# Patient Record
Sex: FEMALE | Race: WHITE | HISPANIC OR LATINO | Employment: OTHER | URBAN - METROPOLITAN AREA
[De-identification: names, ages, dates, MRNs, and addresses within clinical notes are randomized per-mention and may not be internally consistent; named-entity substitution may affect disease eponyms.]

---

## 2017-11-03 ENCOUNTER — OFFICE VISIT (OUTPATIENT)
Dept: OPTOMETRY | Facility: CLINIC | Age: 62
End: 2017-11-03
Payer: MEDICARE

## 2017-11-03 DIAGNOSIS — H52.03 HYPEROPIA WITH PRESBYOPIA OF BOTH EYES: ICD-10-CM

## 2017-11-03 DIAGNOSIS — H25.13 NUCLEAR SCLEROSIS OF BOTH EYES: Primary | ICD-10-CM

## 2017-11-03 DIAGNOSIS — H52.4 HYPEROPIA WITH PRESBYOPIA OF BOTH EYES: ICD-10-CM

## 2017-11-03 PROCEDURE — 92015 DETERMINE REFRACTIVE STATE: CPT | Mod: ,,, | Performed by: OPTOMETRIST

## 2017-11-03 PROCEDURE — 92004 COMPRE OPH EXAM NEW PT 1/>: CPT | Mod: S$PBB,,, | Performed by: OPTOMETRIST

## 2017-11-03 PROCEDURE — 99999 PR PBB SHADOW E&M-NEW PATIENT-LVL II: CPT | Mod: PBBFAC,,, | Performed by: OPTOMETRIST

## 2017-11-03 PROCEDURE — 99202 OFFICE O/P NEW SF 15 MIN: CPT | Mod: PBBFAC | Performed by: OPTOMETRIST

## 2017-11-03 RX ORDER — TRAMADOL HYDROCHLORIDE 50 MG/1
TABLET ORAL 2 TIMES DAILY PRN
COMMUNITY
End: 2018-03-13

## 2017-11-03 RX ORDER — SIMVASTATIN 40 MG/1
40 TABLET, FILM COATED ORAL DAILY
COMMUNITY
End: 2019-01-21 | Stop reason: SDUPTHER

## 2017-11-03 RX ORDER — CLONAZEPAM 1 MG/1
0.5 TABLET ORAL 2 TIMES DAILY
COMMUNITY
End: 2018-09-17 | Stop reason: SDUPTHER

## 2017-11-03 RX ORDER — TELMISARTAN 80 MG/1
TABLET ORAL DAILY
COMMUNITY
End: 2018-09-17 | Stop reason: CLARIF

## 2017-11-03 RX ORDER — MULTIVIT WITH MINERALS/HERBS
1 TABLET ORAL DAILY
COMMUNITY
End: 2020-09-23

## 2017-11-03 RX ORDER — CHOLECALCIFEROL (VITAMIN D3) 25 MCG
1000 TABLET ORAL DAILY
COMMUNITY
End: 2019-03-22

## 2017-11-03 RX ORDER — CHOLECALCIFEROL (VITAMIN D3) 125 MCG
CAPSULE ORAL
COMMUNITY
End: 2018-02-05

## 2017-11-03 RX ORDER — MELOXICAM 15 MG/1
15 TABLET ORAL DAILY
COMMUNITY
End: 2018-02-05

## 2017-11-03 NOTE — PROGRESS NOTES
HPI     Last eye exam was approximately 10 years ago.  Patient states has noticed decrease in overall vision over the past few   years. Using +2.50 OTC readers and has noticed they aren't as helpful. Saw   black spots in vision prior to passing out from dehydration about a month   ago.   Patient denies diplopia, headaches, flashes/floaters, and pain.      Last edited by Dang Barrera on 11/3/2017  2:34 PM. (History)            Assessment /Plan     For exam results, see Encounter Report.    Nuclear sclerosis of both eyes    Hyperopia with presbyopia of both eyes            1.  Educated on cataracts and affects on vision.  Early-monitor.  Eye health normal OU.  2.  Bifocal rx given.  Ok to continue with OTC readers.          RTC 1 year for routine exam.

## 2018-02-05 ENCOUNTER — OFFICE VISIT (OUTPATIENT)
Dept: SPINE | Facility: CLINIC | Age: 63
End: 2018-02-05
Attending: PHYSICAL MEDICINE & REHABILITATION
Payer: MEDICARE

## 2018-02-05 VITALS
HEART RATE: 113 BPM | HEIGHT: 63 IN | DIASTOLIC BLOOD PRESSURE: 79 MMHG | BODY MASS INDEX: 35.61 KG/M2 | WEIGHT: 201 LBS | SYSTOLIC BLOOD PRESSURE: 132 MMHG

## 2018-02-05 DIAGNOSIS — M54.41 CHRONIC MIDLINE LOW BACK PAIN WITH BILATERAL SCIATICA: Primary | ICD-10-CM

## 2018-02-05 DIAGNOSIS — M47.26 OSTEOARTHRITIS OF SPINE WITH RADICULOPATHY, LUMBAR REGION: ICD-10-CM

## 2018-02-05 DIAGNOSIS — R29.818 NEUROGENIC CLAUDICATION: ICD-10-CM

## 2018-02-05 DIAGNOSIS — G89.29 CHRONIC MIDLINE LOW BACK PAIN WITH BILATERAL SCIATICA: Primary | ICD-10-CM

## 2018-02-05 DIAGNOSIS — M54.42 CHRONIC MIDLINE LOW BACK PAIN WITH BILATERAL SCIATICA: Primary | ICD-10-CM

## 2018-02-05 PROCEDURE — 99213 OFFICE O/P EST LOW 20 MIN: CPT | Mod: PBBFAC | Performed by: PHYSICAL MEDICINE & REHABILITATION

## 2018-02-05 PROCEDURE — 99204 OFFICE O/P NEW MOD 45 MIN: CPT | Mod: S$PBB,,, | Performed by: PHYSICAL MEDICINE & REHABILITATION

## 2018-02-05 PROCEDURE — 99999 PR PBB SHADOW E&M-EST. PATIENT-LVL III: CPT | Mod: PBBFAC,,, | Performed by: PHYSICAL MEDICINE & REHABILITATION

## 2018-02-05 RX ORDER — ETODOLAC 200 MG/1
200 CAPSULE ORAL 3 TIMES DAILY
Qty: 90 CAPSULE | Refills: 1 | Status: SHIPPED | OUTPATIENT
Start: 2018-02-05 | End: 2018-04-19 | Stop reason: SDUPTHER

## 2018-02-05 RX ORDER — CYCLOBENZAPRINE HCL 10 MG
5-10 TABLET ORAL 3 TIMES DAILY PRN
Qty: 90 TABLET | Refills: 0 | Status: SHIPPED | OUTPATIENT
Start: 2018-02-05 | End: 2018-03-08 | Stop reason: SDUPTHER

## 2018-02-05 NOTE — PROGRESS NOTES
Subjective:      Patient ID: Joanne Lino is a 62 y.o. female.    Chief Complaint: Low-back Pain    Referred by: Self[facility]     Ms Lino is a 61 yo female here for evaluation of back and leg pain that started on 2/1/2018 when she got up after sitting and fell to the ground with leg numbness.  She went to the ER on 2/2/2018 and was given a toradol shot nsaid and muscle relaxer.  She was walking on Thursday and had back pain and then she had tingling in her legs.  Then once she finished at theater, and was walking the pain came back with numbness in the legs.  She sat and then was able to walk on her own.  She has had episodes of back and leg tingling off and on for the past 10 years, but she has never fallen.  She feels like the medicine from ER has been helpful.  She no longer has numbness and tingling in her legs.  Maybe a little bit.  The pain is worse with standing and walking.  She was able to do everything she needed to this past weekend.  She can get relief sitting.  [place] and did PT at that time.  She has had no other treatment for her lower back.  She has not been to chiropractor or had injections.  She did lift some things prior to the episode.  There is no pain with bending.  PAin is 3/10 now, worst 8/10, best 0/10 lying down, sitting.  She has been taking flexeril twice a day.  She ahs been taking lodine three a day.  She takes mobic and tramadol for her knee.  She ahs not been taking    X-ray lumbar 2/2/2018  Findings:   No fracture identified.  Grade 1-2 spondylolisthesis L4-5 with disc space narrowing.  Facet arthropathy L4-5 and L5-S1.  Degenerative disc changes vacuum phenomenon multilevel sparing only the L3-4 disc space.   ============================  Impression             1.  Multilevel degenerative disc and facet changes.          2.  Grade 1-2 spondylolisthesis L4-5.      Past Medical History:  No date: Ectopic pregnancy  No date: High cholesterol  No date:  Hypertension  No date: Osteoarthritis    Past Surgical History:  No date: OOPHORECTOMY    No family history on file.      Social History    Marital status:              Spouse name:                       Years of education:                 Number of children:               Social History Main Topics    Smoking status: Never Smoker                                                                Smokeless tobacco: Never Used                        Alcohol use: Yes                Comment: Socially    Drug use: No                Current Outpatient Prescriptions:  b complex vitamins tablet, Take 1 tablet by mouth once daily., Disp: , Rfl:    CALCIUM CARBONATE (CALCIUM 500 ORAL), Take by mouth., Disp: , Rfl:   clonazePAM (KLONOPIN) 0.5 MG tablet, Take 0.25 mg by mouth 2 (two) times daily., Disp: , Rfl:   cyclobenzaprine (FLEXERIL) 10 MG tablet, Take 1 tablet (10 mg total) by mouth 3 (three) times daily as needed for Muscle spasms., Disp: 15 tablet, Rfl: 0  DULoxetine (CYMBALTA) 60 MG capsule, Take 60 mg by mouth once daily., Disp: , Rfl:   etodolac (LODINE) 200 MG Cap, Take 1 capsule (200 mg total) by mouth 3 (three) times daily., Disp: 30 capsule, Rfl: 0  meloxicam (MOBIC) 15 MG tablet, Take 15 mg by mouth once daily., Disp: , Rfl:   multivitamin capsule, Take 1 capsule by mouth once daily., Disp: , Rfl:   naproxen sodium (ALEVE) 220 mg Cap, Take by mouth., Disp: , Rfl:   simvastatin (ZOCOR) 40 MG tablet, Take 40 mg by mouth once daily. , Disp: , Rfl:   telmisartan (MICARDIS) 80 MG Tab, Take by mouth once daily. , Disp: , Rfl:   traMADol (ULTRAM) 50 mg tablet, Take by mouth 2 (two) times daily as needed. , Disp: , Rfl:   traZODone (DESYREL) 100 MG tablet, Take 200 mg by mouth every evening., Disp: , Rfl:   vitamin D 1000 units Tab, Take 1,000 Units by mouth once daily., Disp: , Rfl:   ziprasidone (GEODON) 20 MG Cap, Take 20 mg by mouth once daily., Disp: , Rfl:     No current facility-administered medications for  this visit.       Review of patient's allergies indicates:   -- Codeine -- Nausea Only   -- Pcn (penicillins) -- Itching                Review of Systems   Constitution: Negative for weight gain and weight loss.   Cardiovascular: Negative for chest pain.   Respiratory: Negative for shortness of breath.    Musculoskeletal: Positive for back pain. Negative for joint pain and joint swelling.   Gastrointestinal: Negative for abdominal pain and bowel incontinence.   Genitourinary: Negative for bladder incontinence.   Neurological: Positive for paresthesias (bilateral legs). Negative for numbness.           Objective:          General    Vitals reviewed.  Constitutional: She is oriented to person, place, and time. She appears well-developed and well-nourished.   HENT:   Head: Normocephalic and atraumatic.   Pulmonary/Chest: Effort normal.   Neurological: She is alert and oriented to person, place, and time.   Psychiatric: She has a normal mood and affect. Her behavior is normal. Judgment and thought content normal.     General Musculoskeletal Exam   Gait: normal (varus deformity of right knee)     Right Ankle/Foot Exam     Tests   Heel Walk: able to perform  Tiptoe Walk: able to perform    Left Ankle/Foot Exam     Tests   Heel Walk: able to perform  Tiptoe Walk: able to perform  Back (L-Spine & T-Spine) / Neck (C-Spine) Exam     Tenderness Posterior midline palpation reveals tenderness of the Sacrum.     Back (L-Spine & T-Spine) Range of Motion   Extension: 20   Flexion: 90   Lateral Bend Right: 20   Lateral Bend Left: 20   Rotation Right: 40   Rotation Left: 40     Spinal Sensation   Right Side Sensation  C-Spine Level: normal   L-Spine Level: normal  S-Spine Level: normal  Left Side Sensation  C-Spine Level: normal  L-Spine Level: normal  S-Spine Level: normal    Back (L-Spine & T-Spine) Tests   Right Side Tests  Straight leg raise:      Sitting SLR: > 70 degrees      Left Side Tests  Straight leg raise:     Sitting  SLR: > 70 degrees          Other She has no scoliosis .  Spinal Kyphosis:  Absent      Muscle Strength   Right Upper Extremity   Biceps: 5/5/5   Deltoid:  5/5  Triceps:  5/5  Wrist Extension: 5/5/5   Finger Flexors:  5/5  Left Upper Extremity  Biceps: 5/5/5   Deltoid:  5/5  Triceps:  5/5  Wrist Extension: 5/5/5   Finger Flexors:  5/5  Right Lower Extremity   Hip Flexion: 5/5   Quadriceps:  5/5   Anterior tibial:  5/5/5  EHL:  5/5  Left Lower Extremity   Hip Flexion: 5/5   Quadriceps:  5/5   Anterior tibial:  5/5/5   EHL:  5/5    Reflexes     Left Side  Biceps:  2+  Triceps:  2+  Brachioradialis:  2+  Quadriceps:  2+  Achilles:  2+  Left Marks's Sign:  Absent  Babinski Sign:  absent    Right Side   Biceps:  2+  Triceps:  2+  Brachioradialis:  2+  Quadriceps:  2+  Achilles:  2+  Right Marks's Sign:  absent  Babinski Sign:  absent    Vascular Exam     Right Pulses        Carotid:                  2+    Left Pulses        Carotid:                  2+        Assessment:       Encounter Diagnoses   Name Primary?    Chronic midline low back pain with bilateral sciatica Yes    Osteoarthritis of spine with radiculopathy, lumbar region     Neurogenic claudication          Plan:       Joanne was seen today for low-back pain.    Diagnoses and all orders for this visit:    Chronic midline low back pain with bilateral sciatica  -     Ambulatory Referral to Physical/Occupational Therapy    Osteoarthritis of spine with radiculopathy, lumbar region  -     Ambulatory Referral to Physical/Occupational Therapy    Neurogenic claudication    Other orders  -     etodolac (LODINE) 200 MG Cap; Take 1 capsule (200 mg total) by mouth 3 (three) times daily.  -     cyclobenzaprine (FLEXERIL) 10 MG tablet; Take 0.5-1 tablets (5-10 mg total) by mouth 3 (three) times daily as needed for Muscle spasms.         More than 50% of the total time of 45 minutes was spent in counseling on diagnosis and treatment options.  We discussed back pain  and the nature of back pain.  I reviewed the X-ray of the lumbar spine and we discussed facet arthropathy and the L4-5 Spondylolithesis.  We discussed that it will likely improve and that it is not one thing that causes the pain but an accumulation of multiple things that we do.  We discussed posture sitting and the importance of trying to sit better.  We discussed the benefits of therapy and exercise and continuing to move.  1.  Physical therapy: progressive resistance exercise pattern 2at healthy back  2.  Etodolac 200mg po TID  3. Cyclobenzaprine as needed  4.  RTC 10 weeks

## 2018-02-14 NOTE — PROGRESS NOTES
Subjective:      Patient ID: Joanne Lino is a 63 y.o. female.    Chief Complaint: Back Pain    Referred by: No ref. provider found     Ms Lino is a 62 yo female here for follow up of her back and right leg pain that started on 2/1/2018 when she got up after sitting and fell to the ground with leg numbness.  She went to the ER on 2/2/2018 and was given a toradol shot nsaid and muscle relaxer.   She has had episodes of back and leg tingling off and on for the past 10 years, but she has never fallen.  She was last seen by me on 2/5/2018 and she was going to try PT and nsaid.  She has not started therapy.  She has been having more pain with standing and walking, and has to sit because the numbness in the legs.  The pain is across the lower back.  The pain is tender to the touch.  She has not started therapy.  She feels like she has to sit sooner due to numbness. She has back pain with bending.  She has back pain with standing and walking.  She feels better sitting and lying down.  The pain will go away.  She has been taking the flexeril and etodolac.  She has been taking the pills daily, but still has pain.  She stopped cymbalta yesterday,  We discussed getting back on it.  She has a history of back pain for many years.  The pain is 8/10 now, worst 10[place]10 at the [place] standing 2 days ago, best 2/10 sitting or lying down.  She has some pain getting up from sitting.    X-ray lumbar 2/2/2018  Findings:   No fracture identified.  Grade 1-2 spondylolisthesis L4-5 with disc space narrowing.  Facet arthropathy L4-5 and L5-S1.  Degenerative disc changes vacuum phenomenon multilevel sparing only the L3-4 disc space.     Impression             1.  Multilevel degenerative disc and facet changes.       Past Medical History:  No date: Ectopic pregnancy  No date: High cholesterol  No date: Hypertension  No date: Osteoarthritis    Past Surgical History:  No date: OOPHORECTOMY    No family history on file.      Social History     Marital status:              Spouse name:                       Years of education:                 Number of children:               Social History Main Topics    Smoking status: Never Smoker                                                                Smokeless tobacco: Never Used                        Alcohol use: Yes                Comment: Socially    Drug use: No                Current Outpatient Prescriptions:  b complex vitamins tablet, Take 1 tablet by mouth once daily., Disp: , Rfl:    CALCIUM CARBONATE (CALCIUM 500 ORAL), Take by mouth., Disp: , Rfl:   clonazePAM (KLONOPIN) 0.5 MG tablet, Take 0.25 mg by mouth 2 (two) times daily., Disp: , Rfl:   cyclobenzaprine (FLEXERIL) 10 MG tablet, Take 0.5-1 tablets (5-10 mg total) by mouth 3 (three) times daily as needed for Muscle spasms., Disp: 90 tablet, Rfl: 0  DULoxetine (CYMBALTA) 60 MG capsule, Take 60 mg by mouth once daily., Disp: , Rfl:   etodolac (LODINE) 200 MG Cap, Take 1 capsule (200 mg total) by mouth 3 (three) times daily., Disp: 90 capsule, Rfl: 1  multivitamin capsule, Take 1 capsule by mouth once daily., Disp: , Rfl:   simvastatin (ZOCOR) 40 MG tablet, Take 40 mg by mouth once daily. , Disp: , Rfl:   telmisartan (MICARDIS) 80 MG Tab, Take by mouth once daily. , Disp: , Rfl:   traMADol (ULTRAM) 50 mg tablet, Take by mouth 2 (two) times daily as needed. , Disp: , Rfl:   traZODone (DESYREL) 100 MG tablet, Take 200 mg by mouth every evening., Disp: , Rfl:   vitamin D 1000 units Tab, Take 1,000 Units by mouth once daily., Disp: , Rfl:   ziprasidone (GEODON) 20 MG Cap, Take 20 mg by mouth once daily., Disp: , Rfl:     No current facility-administered medications for this visit.       Review of patient's allergies indicates:   -- Codeine -- Nausea Only   -- Pcn (penicillins) -- Itching            Review of Systems   Constitution: Negative for weight gain and weight loss.   Cardiovascular: Negative for chest pain.   Respiratory: Negative for  shortness of breath.    Musculoskeletal: Positive for back pain. Negative for joint pain and joint swelling.   Gastrointestinal: Negative for abdominal pain and bowel incontinence.   Genitourinary: Negative for bladder incontinence.   Neurological: Positive for paresthesias (bilateral legs). Negative for numbness.           Objective:          General    Vitals reviewed.  Constitutional: She is oriented to person, place, and time. She appears well-developed and well-nourished.   HENT:   Head: Normocephalic and atraumatic.   Pulmonary/Chest: Effort normal.   Neurological: She is alert and oriented to person, place, and time.   Psychiatric: She has a normal mood and affect. Her behavior is normal. Judgment and thought content normal.     General Musculoskeletal Exam   Gait: normal (varus deformity of right knee)     Right Ankle/Foot Exam     Tests   Heel Walk: able to perform  Tiptoe Walk: able to perform    Left Ankle/Foot Exam     Tests   Heel Walk: able to perform  Tiptoe Walk: able to perform  Back (L-Spine & T-Spine) / Neck (C-Spine) Exam     Tenderness Right paramedian tenderness of the Sacrum.     Back (L-Spine & T-Spine) Range of Motion   Extension: 20   Flexion: 90   Lateral Bend Right: 20   Lateral Bend Left: 20   Rotation Right: 40   Rotation Left: 40     Spinal Sensation   Right Side Sensation  C-Spine Level: normal   L-Spine Level: normal  S-Spine Level: normal  Left Side Sensation  C-Spine Level: normal  L-Spine Level: normal  S-Spine Level: normal    Back (L-Spine & T-Spine) Tests   Right Side Tests  Straight leg raise:      Sitting SLR: > 70 degrees      Left Side Tests  Straight leg raise:     Sitting SLR: > 70 degrees          Other She has no scoliosis .  Spinal Kyphosis:  Absent      Muscle Strength   Right Upper Extremity   Biceps: 5/5/5   Deltoid:  5/5  Triceps:  5/5  Wrist Extension: 5/5/5   Finger Flexors:  5/5  Left Upper Extremity  Biceps: 5/5/5   Deltoid:  5/5  Triceps:  5/5  Wrist  Extension: 5/5/5   Finger Flexors:  5/5  Right Lower Extremity   Hip Flexion: 5/5   Quadriceps:  5/5   Anterior tibial:  5/5/5  EHL:  5/5  Left Lower Extremity   Hip Flexion: 5/5   Quadriceps:  5/5   Anterior tibial:  5/5/5   EHL:  5/5    Reflexes     Left Side  Biceps:  2+  Triceps:  2+  Brachioradialis:  2+  Quadriceps:  2+  Achilles:  2+  Left Marks's Sign:  Absent  Babinski Sign:  absent    Right Side   Biceps:  2+  Triceps:  2+  Brachioradialis:  2+  Quadriceps:  2+  Achilles:  2+  Right Marks's Sign:  absent  Babinski Sign:  absent    Vascular Exam     Right Pulses        Carotid:                  2+    Left Pulses        Carotid:                  2+        Assessment:       Encounter Diagnoses   Name Primary?    Chronic right-sided low back pain with bilateral sciatica Yes    Spinal enthesopathy     Acute right-sided low back pain without sciatica           Plan:       Joanne was seen today for back pain.    Diagnoses and all orders for this visit:    Chronic right-sided low back pain with bilateral sciatica  -     INJECT TRIGGER POINT, 1 OR 2    Spinal enthesopathy  -     INJECT TRIGGER POINT, 1 OR 2    Acute right-sided low back pain without sciatica   -     INJECT TRIGGER POINT, 1 OR 2    Other orders  -     triamcinolone acetonide injection 40 mg; 1 mL (40 mg total) by Other route once.           1.  Physical therapy: progressive resistance exercise pattern 1 at healthy back  Her pain is more right then midback.  It is worse with bending,  She has acute on chronic back pain.    2.  Etodolac 200mg po TID  3. Cyclobenzaprine as needed  4.  Si joint/iliolumbar  Injection/ A time out was performed, the correct patient, procedure, site, and position confirmed.      right iliolumbar  injection:  the risks and benefits were explained verbal consent was obtained.  A time out was taken.  she was then placed in slightly flexed position the iliolumbar ligament was located and prepped with alcohol.  she was  then injected with a 22 gauge needle with 40mg kenolog and 2 cc of 2% lidocaine.  There were no complications, she felt some immediate relief of the pain.  5.  She is going to restart her cymbalta, she skipped yesterday  6.  RTC 10 weeks, already scheduled 4/13

## 2018-02-15 ENCOUNTER — OFFICE VISIT (OUTPATIENT)
Dept: SPINE | Facility: CLINIC | Age: 63
End: 2018-02-15
Attending: PHYSICAL MEDICINE & REHABILITATION
Payer: MEDICARE

## 2018-02-15 VITALS
HEART RATE: 91 BPM | HEIGHT: 63 IN | DIASTOLIC BLOOD PRESSURE: 70 MMHG | SYSTOLIC BLOOD PRESSURE: 142 MMHG | WEIGHT: 200 LBS | BODY MASS INDEX: 35.44 KG/M2

## 2018-02-15 DIAGNOSIS — G89.29 CHRONIC RIGHT-SIDED LOW BACK PAIN WITH BILATERAL SCIATICA: Primary | ICD-10-CM

## 2018-02-15 DIAGNOSIS — M54.50 ACUTE RIGHT-SIDED LOW BACK PAIN WITHOUT SCIATICA: ICD-10-CM

## 2018-02-15 DIAGNOSIS — M54.41 CHRONIC RIGHT-SIDED LOW BACK PAIN WITH BILATERAL SCIATICA: Primary | ICD-10-CM

## 2018-02-15 DIAGNOSIS — M54.42 CHRONIC RIGHT-SIDED LOW BACK PAIN WITH BILATERAL SCIATICA: Primary | ICD-10-CM

## 2018-02-15 DIAGNOSIS — M46.00 SPINAL ENTHESOPATHY: ICD-10-CM

## 2018-02-15 PROCEDURE — 99213 OFFICE O/P EST LOW 20 MIN: CPT | Mod: PBBFAC | Performed by: PHYSICAL MEDICINE & REHABILITATION

## 2018-02-15 PROCEDURE — 99214 OFFICE O/P EST MOD 30 MIN: CPT | Mod: 25,S$PBB,, | Performed by: PHYSICAL MEDICINE & REHABILITATION

## 2018-02-15 PROCEDURE — 20552 NJX 1/MLT TRIGGER POINT 1/2: CPT | Mod: PBBFAC | Performed by: PHYSICAL MEDICINE & REHABILITATION

## 2018-02-15 PROCEDURE — 99999 PR PBB SHADOW E&M-EST. PATIENT-LVL III: CPT | Mod: PBBFAC,,, | Performed by: PHYSICAL MEDICINE & REHABILITATION

## 2018-02-15 RX ORDER — TRIAMCINOLONE ACETONIDE 40 MG/ML
40 INJECTION, SUSPENSION INTRA-ARTICULAR; INTRAMUSCULAR ONCE
Status: COMPLETED | OUTPATIENT
Start: 2018-02-15 | End: 2018-02-15

## 2018-02-15 RX ADMIN — TRIAMCINOLONE ACETONIDE 40 MG: 40 INJECTION, SUSPENSION INTRA-ARTICULAR; INTRAMUSCULAR at 12:02

## 2018-02-27 ENCOUNTER — CLINICAL SUPPORT (OUTPATIENT)
Dept: REHABILITATION | Facility: OTHER | Age: 63
End: 2018-02-27
Attending: PHYSICAL MEDICINE & REHABILITATION
Payer: MEDICARE

## 2018-02-27 DIAGNOSIS — M54.42 CHRONIC MIDLINE LOW BACK PAIN WITH BILATERAL SCIATICA: ICD-10-CM

## 2018-02-27 DIAGNOSIS — M54.41 CHRONIC MIDLINE LOW BACK PAIN WITH BILATERAL SCIATICA: ICD-10-CM

## 2018-02-27 DIAGNOSIS — M47.26 OSTEOARTHRITIS OF SPINE WITH RADICULOPATHY, LUMBAR REGION: ICD-10-CM

## 2018-02-27 DIAGNOSIS — G89.29 CHRONIC MIDLINE LOW BACK PAIN WITH BILATERAL SCIATICA: ICD-10-CM

## 2018-02-27 PROCEDURE — 97162 PT EVAL MOD COMPLEX 30 MIN: CPT

## 2018-02-27 PROCEDURE — G8978 MOBILITY CURRENT STATUS: HCPCS | Mod: CL

## 2018-02-27 PROCEDURE — 97161 PT EVAL LOW COMPLEX 20 MIN: CPT

## 2018-02-27 PROCEDURE — G8979 MOBILITY GOAL STATUS: HCPCS | Mod: CK

## 2018-02-27 PROCEDURE — 97110 THERAPEUTIC EXERCISES: CPT

## 2018-02-27 NOTE — PATIENT INSTRUCTIONS
Pelvic Tilt        Flatten back by tightening stomach muscles and buttocks.  Repeat ____ times per set. Do ____ sets per session. Do ____ sessions per day.     [email or web address]     Copyright © [employer]. All rights reserved.   Knee-to-Chest Stretch: Bilateral        With hands behind knees, pull both knees in to chest until a comfortable stretch is felt in lower back and buttocks. Keep back relaxed. Hold ____ seconds.  Repeat ____ times per set. Do ____ sets per session. Do ____ sessions per day.     [email or web address]     Copyright © [employer]. All rights reserved.
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
## 2018-03-02 ENCOUNTER — OFFICE VISIT (OUTPATIENT)
Dept: PRIMARY CARE CLINIC | Facility: CLINIC | Age: 63
End: 2018-03-02
Payer: MEDICARE

## 2018-03-02 VITALS
OXYGEN SATURATION: 99 % | SYSTOLIC BLOOD PRESSURE: 114 MMHG | DIASTOLIC BLOOD PRESSURE: 75 MMHG | BODY MASS INDEX: 36.62 KG/M2 | RESPIRATION RATE: 18 BRPM | WEIGHT: 199 LBS | TEMPERATURE: 98 F | HEIGHT: 62 IN | HEART RATE: 94 BPM

## 2018-03-02 DIAGNOSIS — I10 ESSENTIAL HYPERTENSION, BENIGN: Primary | ICD-10-CM

## 2018-03-02 DIAGNOSIS — M54.41 CHRONIC RIGHT-SIDED LOW BACK PAIN WITH BILATERAL SCIATICA: ICD-10-CM

## 2018-03-02 DIAGNOSIS — E78.5 HYPERLIPIDEMIA, UNSPECIFIED HYPERLIPIDEMIA TYPE: ICD-10-CM

## 2018-03-02 DIAGNOSIS — M17.11 PRIMARY OSTEOARTHRITIS OF RIGHT KNEE: ICD-10-CM

## 2018-03-02 DIAGNOSIS — M54.42 CHRONIC RIGHT-SIDED LOW BACK PAIN WITH BILATERAL SCIATICA: ICD-10-CM

## 2018-03-02 DIAGNOSIS — G89.29 CHRONIC RIGHT-SIDED LOW BACK PAIN WITH BILATERAL SCIATICA: ICD-10-CM

## 2018-03-02 PROCEDURE — 99203 OFFICE O/P NEW LOW 30 MIN: CPT | Mod: S$PBB,,, | Performed by: FAMILY MEDICINE

## 2018-03-02 PROCEDURE — 99999 PR PBB SHADOW E&M-EST. PATIENT-LVL III: CPT | Mod: PBBFAC,,, | Performed by: FAMILY MEDICINE

## 2018-03-02 PROCEDURE — 99213 OFFICE O/P EST LOW 20 MIN: CPT | Mod: PBBFAC,PN | Performed by: FAMILY MEDICINE

## 2018-03-02 NOTE — PROGRESS NOTES
"Subjective:       Patient ID: Joanne Lino is a 63 y.o. female.    Chief Complaint: Establish Care    Recently moved here from [place], recently  from her . Currently staying with a friend. Patient's kids and grandkids all live in [place].   Currently being treated for flare up of lower back pain with sciatica, doing PT at [facility], had steroid injection to lower back a few weeks ago, got a few days of relief.  Also has OA of right knee, has had 2 Synvisc injections with good relief, requesting to be set up with local orthopedist.  Labs done by former PCP ~4 months ago.  Long overdue for mammogram, but doesn't want to schedule at this time.      Review of Systems   Constitutional: Negative for fever.   Eyes: Negative for visual disturbance.   Respiratory: Negative for shortness of breath.    Cardiovascular: Negative for chest pain.   Gastrointestinal: Negative for diarrhea, nausea and vomiting.   Genitourinary: Negative for difficulty urinating.   Musculoskeletal: Positive for arthralgias and back pain.   Skin: Negative for rash.   Neurological: Negative for dizziness and light-headedness.   Hematological: Does not bruise/bleed easily.   Psychiatric/Behavioral: Negative for sleep disturbance.       Objective:      Vitals:    03/02/18 0941   BP: 114/75   BP Location: Left arm   Patient Position: Sitting   BP Method: Large (Automatic)   Pulse: 94   Resp: 18   Temp: 98.2 °F (36.8 °C)   TempSrc: Oral   SpO2: 99%   Weight: 90.3 kg (199 lb)   Height: 5' 2" (1.575 m)     Physical Exam   Constitutional: She is oriented to person, place, and time. She appears well-developed and well-nourished.   HENT:   Head: Normocephalic and atraumatic.   Neck: Neck supple. No JVD present. Carotid bruit is not present.   Cardiovascular: Normal rate, regular rhythm and normal heart sounds.    Pulmonary/Chest: Effort normal and breath sounds normal.   Abdominal: Soft. Bowel sounds are normal. There is no " tenderness.   Musculoskeletal: She exhibits no edema.        Right knee: She exhibits normal range of motion (crepitus with passive RoM), no effusion, no deformity and no bony tenderness.   Neurological: She is alert and oriented to person, place, and time.   Skin: Skin is warm and dry.   Psychiatric: She has a normal mood and affect. Her behavior is normal.   Nursing note and vitals reviewed.      Assessment:       1. Essential hypertension, benign Stable   2. Primary osteoarthritis of right knee    3. Hyperlipidemia, unspecified hyperlipidemia type    4. Chronic right-sided low back pain with bilateral sciatica        Plan:       Essential hypertension, benign  Comments:  need to get records from previous PCP  Orders:  -     CBC auto differential; Future; Expected date: 06/02/2018  -     TSH; Future; Expected date: 06/02/2018  -     POCT EKG 12-LEAD (NOT FOR [facility] USE); Future; Expected date: 06/02/2018    Primary osteoarthritis of right knee  -     Ambulatory referral to Orthopedics    Hyperlipidemia, unspecified hyperlipidemia type  -     Comprehensive metabolic panel; Future; Expected date: 06/02/2018  -     Lipid panel; Future; Expected date: 06/02/2018  -     TSH; Future; Expected date: 06/02/2018    Chronic right-sided low back pain with bilateral sciatica  Comments:  continue PT, PM&R      Medication List with Changes/Refills   Current Medications    B COMPLEX VITAMINS TABLET    Take 1 tablet by mouth once daily.    CALCIUM CARBONATE (CALCIUM 500 ORAL)    Take by mouth.    CLONAZEPAM (KLONOPIN) 0.5 MG TABLET    Take 0.25 mg by mouth 2 (two) times daily.    CYCLOBENZAPRINE (FLEXERIL) 10 MG TABLET    Take 0.5-1 tablets (5-10 mg total) by mouth 3 (three) times daily as needed for Muscle spasms.    ETODOLAC (LODINE) 200 MG CAP    Take 1 capsule (200 mg total) by mouth 3 (three) times daily.    MULTIVITAMIN CAPSULE    Take 1 capsule by mouth once daily.    SIMVASTATIN (ZOCOR) 40 MG TABLET    Take 40 mg by  mouth once daily.     TELMISARTAN (MICARDIS) 80 MG TAB    Take by mouth once daily.     TRAMADOL (ULTRAM) 50 MG TABLET    Take by mouth 2 (two) times daily as needed.     TRAZODONE (DESYREL) 100 MG TABLET    Take 200 mg by mouth every evening.    VITAMIN D 1000 UNITS TAB    Take 1,000 Units by mouth once daily.

## 2018-03-05 ENCOUNTER — CLINICAL SUPPORT (OUTPATIENT)
Dept: REHABILITATION | Facility: OTHER | Age: 63
End: 2018-03-05
Attending: PHYSICAL MEDICINE & REHABILITATION
Payer: MEDICARE

## 2018-03-05 DIAGNOSIS — G89.29 CHRONIC MIDLINE LOW BACK PAIN WITH BILATERAL SCIATICA: Primary | ICD-10-CM

## 2018-03-05 DIAGNOSIS — M54.42 CHRONIC MIDLINE LOW BACK PAIN WITH BILATERAL SCIATICA: Primary | ICD-10-CM

## 2018-03-05 DIAGNOSIS — M54.41 CHRONIC MIDLINE LOW BACK PAIN WITH BILATERAL SCIATICA: Primary | ICD-10-CM

## 2018-03-05 PROCEDURE — 97110 THERAPEUTIC EXERCISES: CPT

## 2018-03-05 NOTE — PROGRESS NOTES
"[facility]      Name: Joanne Lino  Clinic Number: [id number]  Date of Treatment: 2018   Diagnosis:   Encounter Diagnosis   Name Primary?    Chronic midline low back pain with bilateral sciatica Yes      Physician: Tammi Matute, *    Pain pattern determined: 1PEN   , flexion responder  Plan of care signed: Not signed as of 2018   Time in: 2:00  Time Out: 3:00  Total Treatment time: 50  Precautions: HTN/OA, Grade 1-2 spondylolisthesis L4-5  Visit #: 2    POC due: Not signed as of 2018    Reassessment due:3/26/18    Subjective   Joanne reports worsening of symptoms.  Pt reported severe increase in soreness following IE. She found it difficult to walk for several days after (rating as 9/10 at worst). She purchased an ice pack, ball, and lumbar roll. She has been doing HEP but finds it hurts her back. Pt reporting feeling better by end of session to a 3/10 from 6/10 when presenting to PT. However, Pt called following session to report increased LE numbness while standing at the elevator which improved when sitting in car. Pt denies loss of bowel/bladder, weakness, or falls.     Patient reports their pain to be 6/10 on a 0-10 scale with 0 being no pain and 10 being the worst pain imaginable.    Pain Location: B LB/Lumbar    Occupation: No  Leisure: walk for exercise/play with [employer]/golfing/travel                     Pts goals:  "Be able to do more without pain , not fall"       Objective   Baseline IM Testing Results:   Date of testin18  ROM 18-42 deg   Max Peak Torque 84    Min Peak Torque 39    Flex/Ext Ratio 2.15   % below normative data 55     Femur 5     FOTO: Focus on Therapeutic Outcomes   Category: lumbar   % Impaired: 60%  Current Score  = CK = at least 40% but < 60% impaired, limited or restricted  Goal at Discharge Score = CJ = at least 20% but < 40% impaired, limited or restricted    Treatment    Pt was instructed in and " performed the following:     Joanne received therapeutic exercises to develop/improved posture, cardiovascular endurance, muscular endurance, lumbar/cervical ROM, strength and muscular endurance for 50 minutes including the following exercises:     [facility] 3/5/2018   Visit Number 2   VAS Pain Rating 6   Recumbent Bike Seat Pos. 12   Time 10   Lumbar Extension Seat Pad -   Femur Restraint -   Top Dead Center -   Counterweight -   Lumbar Flexion -   Lumbar Extension -   Lumbar Peak Torque -   Min Torque -   Percent From Norm -   Lumbar Weight 35   Repetitions 20   Rating of Perceived Exertion 2   Ice - Z Lie (in min.) 10     PPT 10x (Mod v/c to reduce hip extension and bridging)   TANYA (Pt reported pain when performing without therball)  Clam shell 10x (Per hip weakness and pt request for hip strengthening exercises)     Peripheral muscle strengthening which included 1 set of 15-20 repetitions at a slow, controlled 7 second per rep pace focused on strengthening supporting musculature for improved body mechanics and functional mobility.  Pt and therapist focused on proper form during treatment to ensure optimal strengthening of each targeted muscle group.  Machines were utilized including torso rotation, leg extension, leg curl, chest press, upright row. Tricep extension, bicep curl, leg press, and hip abduction added on third visit.       Joanne received the following manual therapy techniques: none    Home Exercise Program as follows:   Flexion in lying with theraball 10x, 3x/day   PPT 10x   Sidelying clamshells 15x    Handouts were given to the patient. Pt demo good understanding of the education provided. Joanne demonstrated good return demonstration of activities.     Lumbar roll use compliance: compliant with LR, ice pack, and theraball  Additional exercises taught this treatment session:   Sidelying clamshells 15x    Assessment     Pt presents to physical therapy for 2nd visit following initial  evaluation. Pt reports severe soreness following initial evaluation. Reviewed HEP exercises. Pt performed with moderate verbal cues for technique detailed above. Pt introduced to Med X lumbar dynamic exercises and peripheral resistance exercises up to upright row.  Pt tolerated Med X lumbar exercise at previous warm up weight of 35 with reported 2/10 Hakeem Exertion scale. Pt required moderate verbal cues to maintian speed to 7 sec per rep. Pt completed all peripheral resistance exercises with no reports of increased symptoms or discomfort.   Pt called following session to report increased LE numbness while standing at the elevator which improved when sitting in car. Pt denies loss of bowel/bladder, weakness, or falls.     Patient is making fair progress towards established goals.  Pt will continue to benefit from skilled outpatient physical therapy to address the deficits stated in the impairment chart, provide pt/family education and to maximize pt's level of independence in the home and community environment.       Pt's spiritual, cultural and educational needs considered and pt agreeable to plan of care and goals as stated below:     Medical necessity is demonstrated by the following problem list.    Pt presents with the following impairments:   History  Co-morbidities and personal factors that may impact the plan of care Examination  Body Structures and Functions, activity limitations and participation restrictions that may impact the plan of care Clinical Presentation    Decision Making/ Complexity Score   Co-morbidities:   OA/HTN     Personal Factors:   no deficits Body Regions:   back  lower extremities     Body Systems:   gross symmetry  ROM  strength  gross coordinated movement  gait  transfers  transitions  motor control     Activity limitations:   Learning and applying knowledge  no deficits     General Tasks and Commands  no deficits     Communication  no deficits     Mobility  lifting and carrying  objects  walking     Self care  no deficits     Domestic Life  no deficits     Interactions/Relationships  no deficits     Life Areas  no deficits     Community and Social Life  community life  recreation and leisure     Participation Restrictions:   Walking/standing > 5-10 min/bending fwd/stairs       evolving clinical presentation with changing clinical characteristics    moderate            GOALS: Pt is in agreement with the following goals.     Short term goals:  6 weeks or 10 visits   1.  Pt will demonstrate increased lumbar ROM by at least 3 degrees from the initial ROM value with improvements noted in functional ROM and ability to perform ADLs  2.  Pt will demonstrate increased maximum isometric torque value by 5% when compared to the initial value resulting in improved ability to perform bending, lifting, and carrying activities safely, confidently.  3.  Patient report a reduction in worst pain score by 1-2 points for improved tolerance during work and recreational activities  4.  Pt able to perform HEP correctly with minimal cueing or supervision for therapist     Long term goals: 13 weeks or 20 visits   1. Pt will demonstrate increased lumbar ROM by at least 6 degrees from initial ROM value, resulting in improved ability to perform functional fwd bending while standing and sitting.   2. Pt will demonstrate increased maximum isometric torque value by 10% when compared to the initial value resulting in improved ability to perform bending, lifting, and carrying activities safely, confidently.  3. Pt to demonstrate ability to independently control and reduce their pain through posture positioning and mechanical movements throughout a typical day.  4.  Patient will demonstrate improved overall function per FOTO Survey to CK = at least 40% but < 60% impaired, limited or restricted score or less.  5. Pt will be able to ambulate 10 minutes without experiencing LE numbness/weakness  6. Pt will be able to stairclimb  with RG without pain      Plan   Continue with established Plan of Care towards established PT goals.

## 2018-03-08 ENCOUNTER — OFFICE VISIT (OUTPATIENT)
Dept: SPINE | Facility: CLINIC | Age: 63
End: 2018-03-08
Attending: PHYSICAL MEDICINE & REHABILITATION
Payer: MEDICARE

## 2018-03-08 VITALS
HEIGHT: 62 IN | DIASTOLIC BLOOD PRESSURE: 74 MMHG | SYSTOLIC BLOOD PRESSURE: 143 MMHG | WEIGHT: 200 LBS | BODY MASS INDEX: 36.8 KG/M2 | HEART RATE: 79 BPM

## 2018-03-08 DIAGNOSIS — M54.42 CHRONIC RIGHT-SIDED LOW BACK PAIN WITH BILATERAL SCIATICA: Primary | ICD-10-CM

## 2018-03-08 DIAGNOSIS — M54.41 CHRONIC MIDLINE LOW BACK PAIN WITH BILATERAL SCIATICA: ICD-10-CM

## 2018-03-08 DIAGNOSIS — R29.818 NEUROGENIC CLAUDICATION: ICD-10-CM

## 2018-03-08 DIAGNOSIS — G89.29 CHRONIC RIGHT-SIDED LOW BACK PAIN WITH BILATERAL SCIATICA: Primary | ICD-10-CM

## 2018-03-08 DIAGNOSIS — M47.26 OSTEOARTHRITIS OF SPINE WITH RADICULOPATHY, LUMBAR REGION: ICD-10-CM

## 2018-03-08 DIAGNOSIS — G89.29 CHRONIC MIDLINE LOW BACK PAIN WITH BILATERAL SCIATICA: ICD-10-CM

## 2018-03-08 DIAGNOSIS — M54.50 ACUTE RIGHT-SIDED LOW BACK PAIN WITHOUT SCIATICA: ICD-10-CM

## 2018-03-08 DIAGNOSIS — M46.00 SPINAL ENTHESOPATHY: ICD-10-CM

## 2018-03-08 DIAGNOSIS — M54.41 CHRONIC RIGHT-SIDED LOW BACK PAIN WITH BILATERAL SCIATICA: Primary | ICD-10-CM

## 2018-03-08 DIAGNOSIS — M54.42 CHRONIC MIDLINE LOW BACK PAIN WITH BILATERAL SCIATICA: ICD-10-CM

## 2018-03-08 PROCEDURE — 99214 OFFICE O/P EST MOD 30 MIN: CPT | Mod: S$PBB,,, | Performed by: PHYSICAL MEDICINE & REHABILITATION

## 2018-03-08 PROCEDURE — 99999 PR PBB SHADOW E&M-EST. PATIENT-LVL III: CPT | Mod: PBBFAC,,, | Performed by: PHYSICAL MEDICINE & REHABILITATION

## 2018-03-08 PROCEDURE — 99213 OFFICE O/P EST LOW 20 MIN: CPT | Mod: PBBFAC | Performed by: PHYSICAL MEDICINE & REHABILITATION

## 2018-03-08 RX ORDER — CYCLOBENZAPRINE HCL 10 MG
5-10 TABLET ORAL 3 TIMES DAILY PRN
Qty: 90 TABLET | Refills: 1 | Status: SHIPPED | OUTPATIENT
Start: 2018-03-08 | End: 2018-04-23 | Stop reason: SDUPTHER

## 2018-03-08 RX ORDER — GABAPENTIN 100 MG/1
100-200 CAPSULE ORAL 3 TIMES DAILY
Qty: 180 CAPSULE | Refills: 2 | Status: SHIPPED | OUTPATIENT
Start: 2018-03-08 | End: 2018-06-20 | Stop reason: SDUPTHER

## 2018-03-08 NOTE — PROGRESS NOTES
Subjective:      Patient ID: Joanne Lino is a 63 y.o. female.    Chief Complaint: Low-back Pain    Referred by: No ref. provider found     Ms Lino is a 62 yo female here for follow up of her low back and right leg pain that started on 2/1/2018 when she got up after sitting and fell to the ground with leg numbness.  She went to the ER on 2/2/2018 and was given a toradol shot nsaid and muscle relaxer.   She has had episodes of back and leg tingling off and on for the past 10 years, but she has never fallen.  She was seen by me on 2/5/2018 and then on 2/15/2018 and was continuing to have pain but had not started therapy.  We did a right iliolumbar injection on 2/15 and she has been to 2 PT sessions.  She feels like she has increased pain.  She has increased burning in the lower back.  She feels like her legs are getting numb and tingling with standing too long.  She feels like she cannot stand and walk to far.  She feels like the injection gave her 100% relief for 2-3 days.  She is worried about going to [employer].  She is concerned about numbness and tingling.  It is not constant.  It gets better when she sits.  The pain is better with sitting.  Pain in the back is 8/10 now, worst 9/10  Standing.  She has more pain after therapy.  The numbness is the front of the leg to the top of foot, both legs.        X-ray lumbar 2/2/2018  Findings:   No fracture identified.  Grade 1-2 spondylolisthesis L4-5 with disc space narrowing.  Facet arthropathy L4-5 and L5-S1.  Degenerative disc changes vacuum phenomenon multilevel sparing only the L3-4 disc space.      Impression               1.  Multilevel degenerative disc and facet changes.     Past Medical History:  No date: Carpal tunnel syndrome  No date: Depression  No date: Ectopic pregnancy  No date: High cholesterol  No date: Hypertension  No date: Osteoarthritis  No date: PTSD (post-traumatic stress disorder)    Past Surgical History:  No date: OOPHORECTOMY    Review  of patient's family history indicates:    Cancer                         Mother                    Heart disease                  Mother                    Heart disease                  Father                    Cancer                         Daughter                    Social History    Marital status:              Spouse name:                       Years of education:                 Number of children:               Social History Main Topics    Smoking status: Never Smoker                                                                Smokeless tobacco: Never Used                        Alcohol use: Yes                Comment: Socially    Drug use: No                Current Outpatient Prescriptions:  b complex vitamins tablet, Take 1 tablet by mouth once daily., Disp: , Rfl:    CALCIUM CARBONATE (CALCIUM 500 ORAL), Take by mouth., Disp: , Rfl:   clonazePAM (KLONOPIN) 0.5 MG tablet, Take 0.25 mg by mouth 2 (two) times daily., Disp: , Rfl:   cyclobenzaprine (FLEXERIL) 10 MG tablet, Take 0.5-1 tablets (5-10 mg total) by mouth 3 (three) times daily as needed for Muscle spasms., Disp: 90 tablet, Rfl: 0  etodolac (LODINE) 200 MG Cap, Take 1 capsule (200 mg total) by mouth 3 (three) times daily., Disp: 90 capsule, Rfl: 1  multivitamin capsule, Take 1 capsule by mouth once daily., Disp: , Rfl:   simvastatin (ZOCOR) 40 MG tablet, Take 40 mg by mouth once daily. , Disp: , Rfl:   telmisartan (MICARDIS) 80 MG Tab, Take by mouth once daily. , Disp: , Rfl:   traMADol (ULTRAM) 50 mg tablet, Take by mouth 2 (two) times daily as needed. , Disp: , Rfl:   traZODone (DESYREL) 100 MG tablet, Take 200 mg by mouth every evening., Disp: , Rfl:   vitamin D 1000 units Tab, Take 1,000 Units by mouth once daily., Disp: , Rfl:     No current facility-administered medications for this visit.       Review of patient's allergies indicates:   -- Codeine -- Nausea Only   -- Pcn (penicillins) -- Itching            Review of Systems    Constitution: Negative for weight gain and weight loss.   Cardiovascular: Negative for chest pain.   Respiratory: Negative for shortness of breath.    Musculoskeletal: Positive for back pain. Negative for joint pain and joint swelling.   Gastrointestinal: Negative for abdominal pain and bowel incontinence.   Genitourinary: Negative for bladder incontinence.   Neurological: Positive for paresthesias (bilateral legs). Negative for numbness.           Objective:          General    Vitals reviewed.  Constitutional: She is oriented to person, place, and time. She appears well-developed and well-nourished.   HENT:   Head: Normocephalic and atraumatic.   Pulmonary/Chest: Effort normal.   Neurological: She is alert and oriented to person, place, and time.   Psychiatric: She has a normal mood and affect. Her behavior is normal. Judgment and thought content normal.     General Musculoskeletal Exam   Gait: normal (varus deformity of right knee)     Right Ankle/Foot Exam     Tests   Heel Walk: able to perform  Tiptoe Walk: able to perform    Left Ankle/Foot Exam     Tests   Heel Walk: able to perform  Tiptoe Walk: able to perform  Back (L-Spine & T-Spine) / Neck (C-Spine) Exam     Back (L-Spine & T-Spine) Range of Motion   Extension: 20   Flexion: 90   Lateral Bend Right: 20   Lateral Bend Left: 20   Rotation Right: 40   Rotation Left: 40     Spinal Sensation   Right Side Sensation  C-Spine Level: normal   L-Spine Level: normal  S-Spine Level: normal  Left Side Sensation  C-Spine Level: normal  L-Spine Level: normal  S-Spine Level: normal    Back (L-Spine & T-Spine) Tests   Right Side Tests  Straight leg raise:      Sitting SLR: > 70 degrees      Left Side Tests  Straight leg raise:     Sitting SLR: > 70 degrees          Other She has no scoliosis .  Spinal Kyphosis:  Absent      Muscle Strength   Right Upper Extremity   Biceps: 5/5/5   Deltoid:  5/5  Triceps:  5/5  Wrist Extension: 5/5/5   Finger Flexors:  5/5  Left Upper  Extremity  Biceps: 5/5/5   Deltoid:  5/5  Triceps:  5/5  Wrist Extension: 5/5/5   Finger Flexors:  5/5  Right Lower Extremity   Hip Flexion: 5/5   Quadriceps:  5/5   Anterior tibial:  5/5/5  EHL:  5/5  Left Lower Extremity   Hip Flexion: 5/5   Quadriceps:  5/5   Anterior tibial:  5/5/5   EHL:  5/5    Reflexes     Left Side  Biceps:  2+  Triceps:  2+  Brachioradialis:  2+  Quadriceps:  2+  Achilles:  2+  Left Marks's Sign:  Absent  Babinski Sign:  absent    Right Side   Biceps:  2+  Triceps:  2+  Brachioradialis:  2+  Quadriceps:  2+  Achilles:  2+  Right Marks's Sign:  absent  Babinski Sign:  absent    Vascular Exam     Right Pulses        Carotid:                  2+    Left Pulses        Carotid:                  2+        Assessment:       Encounter Diagnoses   Name Primary?    Chronic right-sided low back pain with bilateral sciatica Yes    Spinal enthesopathy     Acute right-sided low back pain without sciatica      Chronic midline low back pain with bilateral sciatica     Osteoarthritis of spine with radiculopathy, lumbar region     Neurogenic claudication          Plan:       Joanne was seen today for low-back pain.    Diagnoses and all orders for this visit:    Chronic right-sided low back pain with bilateral sciatica  -     MRI Lumbar Spine Without Contrast; Future    Spinal enthesopathy  -     MRI Lumbar Spine Without Contrast; Future    Acute right-sided low back pain without sciatica   -     MRI Lumbar Spine Without Contrast; Future    Chronic midline low back pain with bilateral sciatica  -     MRI Lumbar Spine Without Contrast; Future    Osteoarthritis of spine with radiculopathy, lumbar region  -     MRI Lumbar Spine Without Contrast; Future    Neurogenic claudication  -     MRI Lumbar Spine Without Contrast; Future    Other orders  -     gabapentin (NEURONTIN) 100 MG capsule; Take 1-2 capsules (100-200 mg total) by mouth 3 (three) times daily.  -     cyclobenzaprine (FLEXERIL) 10 MG  tablet; Take 0.5-1 tablets (5-10 mg total) by mouth 3 (three) times daily as needed for Muscle spasms.           1.  Continue Physical therapy: progressive resistance exercise pattern 1 at [facility]  She did get 100% relief from iliolumbar injection, but just a few days.  We discussed the numbness and neurogenic claudication.    2.  Etodolac 200mg po TID  3. Cyclobenzaprine as needed  4.  MRI of the lumbar spine  5.  Gabapentin 100-200 mg TID  6.  RTC 10 weeks, already scheduled 4/13

## 2018-03-09 ENCOUNTER — CLINICAL SUPPORT (OUTPATIENT)
Dept: REHABILITATION | Facility: OTHER | Age: 63
End: 2018-03-09
Attending: PHYSICAL MEDICINE & REHABILITATION
Payer: MEDICARE

## 2018-03-09 DIAGNOSIS — G89.29 CHRONIC RIGHT-SIDED LOW BACK PAIN WITH BILATERAL SCIATICA: ICD-10-CM

## 2018-03-09 DIAGNOSIS — M54.41 CHRONIC RIGHT-SIDED LOW BACK PAIN WITH BILATERAL SCIATICA: ICD-10-CM

## 2018-03-09 DIAGNOSIS — M54.42 CHRONIC RIGHT-SIDED LOW BACK PAIN WITH BILATERAL SCIATICA: ICD-10-CM

## 2018-03-09 PROCEDURE — 97110 THERAPEUTIC EXERCISES: CPT

## 2018-03-09 NOTE — PROGRESS NOTES
"[facility]      Name: Joanne Lino  Clinic Number: [id number]  Date of Treatment: 2018   Diagnosis:   No diagnosis found.   Physician: Tammi Matute, *    Pain pattern determined: 1PEN   , flexion responder  Plan of care signed: Not signed as of 2018   Time in: 8:00  Time Out: 9:00  Total Treatment time: 50  Precautions: HTN/OA, Grade 1-2 spondylolisthesis L4-5  Visit #: 3    POC due: Not signed as of 2018    Reassessment due:3/26/18    Subjective   Joanne reports 7/10 LB and minimal tingling of her B LE. She purchased an ice pack, ball, and lumbar roll. She has been doing HEP. Pt states she has falling before due to her legs become numb and her legs give out.     Patient reports their pain to be 7/10 on a 0-10 scale with 0 being no pain and 10 being the worst pain imaginable.    Pain Location: B LB/Lumbar    Occupation: No  Leisure: walk for exercise/play with [email or web address]/travel                     Pts goals:  "Be able to do more without pain , not fall"     Face to Face discussion of patient was done between PT and PTA.     Objective   Baseline IM Testing Results:   Date of testin18  ROM 18-42 deg   Max Peak Torque 84    Min Peak Torque 39    Flex/Ext Ratio 2.15   % below normative data 55     Femur 5     FOTO: Focus on Therapeutic Outcomes   Category: lumbar   % Impaired: 60%  Current Score  = CK = at least 40% but < 60% impaired, limited or restricted  Goal at Discharge Score = CJ = at least 20% but < 40% impaired, limited or restricted    Treatment    Pt was instructed in and performed the following:     Joanne received therapeutic exercises to develop/improved posture, cardiovascular endurance, muscular endurance, lumbar/cervical ROM, strength and muscular endurance for 50 minutes including the following exercises:     [facility] 3/9/2018   Visit Number 3   VAS Pain Rating 7   Recumbent Bike Seat Pos. 12   Time 10 "   Lumbar Extension Seat Pad -   Femur Restraint -   Top Dead Center -   Counterweight -   Lumbar Flexion -   Lumbar Extension -   Lumbar Peak Torque -   Min Torque -   Percent From Norm -   Lumbar Weight 38   Repetitions 20   Rating of Perceived Exertion 3   Ice - Z Lie (in min.) 10       PPT 10x (Mod v/c to reduce hip extension and bridging)   TANYA (Pt reported pain when performing without therball)  Clam shell 10x (Per hip weakness and pt request for hip strengthening exercises)     Peripheral muscle strengthening which included 1 set of 15-20 repetitions at a slow, controlled 7 second per rep pace focused on strengthening supporting musculature for improved body mechanics and functional mobility.  Pt and therapist focused on proper form during treatment to ensure optimal strengthening of each targeted muscle group.  Machines were utilized including torso rotation, leg extension, leg curl, chest press, upright row. Tricep extension, bicep curl, leg press, and hip abduction added on third visit.       Joanne received the following manual therapy techniques: none    Home Exercise Program as follows:   Flexion in lying with theraball 10x, 3x/day   PPT 10x   Sidelying clamshells 15x    Handouts were given to the patient. Pt demo good understanding of the education provided. Joanne demonstrated good return demonstration of activities.     Lumbar roll use compliance: compliant with LR, ice pack, and theraball  Additional exercises taught this treatment session:   Introduce seated trunk flexion next session.    Assessment      Reviewed HEP exercises. Pt performed with moderate verbal cues for technique detailed above. Pt tolerated medx machine with a weight increase and tolerated all the  peripheral resistance exercises with no c/o LBP.  Pt required moderate verbal cues to maintian speed to 7 sec per rep. She may benefit from a extension ROM increase due to pt doing more ROM. Pt pain decreased post HEP and session.  Introduce seated trunk flexion she can do out in the community when her legs feel weak or numb.    Patient is making fair progress towards established goals.  Pt will continue to benefit from skilled outpatient physical therapy to address the deficits stated in the impairment chart, provide pt/family education and to maximize pt's level of independence in the home and community environment.       Pt's spiritual, cultural and educational needs considered and pt agreeable to plan of care and goals as stated below:     Medical necessity is demonstrated by the following problem list.    Pt presents with the following impairments:   History  Co-morbidities and personal factors that may impact the plan of care Examination  Body Structures and Functions, activity limitations and participation restrictions that may impact the plan of care Clinical Presentation    Decision Making/ Complexity Score   Co-morbidities:   OA/HTN     Personal Factors:   no deficits Body Regions:   back  lower extremities     Body Systems:   gross symmetry  ROM  strength  gross coordinated movement  gait  transfers  transitions  motor control     Activity limitations:   Learning and applying knowledge  no deficits     General Tasks and Commands  no deficits     Communication  no deficits     Mobility  lifting and carrying objects  walking     Self care  no deficits     Domestic Life  no deficits     Interactions/Relationships  no deficits     Life Areas  no deficits     Community and Social Life  community life  recreation and leisure     Participation Restrictions:   Walking/standing > 5-10 min/bending fwd/stairs       evolving clinical presentation with changing clinical characteristics    moderate            GOALS: Pt is in agreement with the following goals.     Short term goals:  6 weeks or 10 visits   1.  Pt will demonstrate increased lumbar ROM by at least 3 degrees from the initial ROM value with improvements noted in functional ROM and  ability to perform ADLs  2.  Pt will demonstrate increased maximum isometric torque value by 5% when compared to the initial value resulting in improved ability to perform bending, lifting, and carrying activities safely, confidently.  3.  Patient report a reduction in worst pain score by 1-2 points for improved tolerance during work and recreational activities  4.  Pt able to perform HEP correctly with minimal cueing or supervision for therapist     Long term goals: 13 weeks or 20 visits   1. Pt will demonstrate increased lumbar ROM by at least 6 degrees from initial ROM value, resulting in improved ability to perform functional fwd bending while standing and sitting.   2. Pt will demonstrate increased maximum isometric torque value by 10% when compared to the initial value resulting in improved ability to perform bending, lifting, and carrying activities safely, confidently.  3. Pt to demonstrate ability to independently control and reduce their pain through posture positioning and mechanical movements throughout a typical day.  4.  Patient will demonstrate improved overall function per FOTO Survey to CK = at least 40% but < 60% impaired, limited or restricted score or less.  5. Pt will be able to ambulate 10 minutes without experiencing LE numbness/weakness   6. Pt will be able to stairclimb with RG without pain      Plan   Continue with established Plan of Care towards established PT goals.

## 2018-03-12 ENCOUNTER — CLINICAL SUPPORT (OUTPATIENT)
Dept: REHABILITATION | Facility: OTHER | Age: 63
End: 2018-03-12
Attending: PHYSICAL MEDICINE & REHABILITATION
Payer: MEDICARE

## 2018-03-12 DIAGNOSIS — G89.29 CHRONIC RIGHT-SIDED LOW BACK PAIN WITH BILATERAL SCIATICA: ICD-10-CM

## 2018-03-12 DIAGNOSIS — M54.41 CHRONIC RIGHT-SIDED LOW BACK PAIN WITH BILATERAL SCIATICA: ICD-10-CM

## 2018-03-12 DIAGNOSIS — M54.42 CHRONIC RIGHT-SIDED LOW BACK PAIN WITH BILATERAL SCIATICA: ICD-10-CM

## 2018-03-12 PROCEDURE — 97110 THERAPEUTIC EXERCISES: CPT

## 2018-03-12 NOTE — PATIENT INSTRUCTIONS
Side Twist, Supine (Non-Weight Bearing)        Lie on back, feet flat on floor. Slowly rock knees from side to side in small, pain-free range of motion. Allow lower back to rotate slightly.  Repeat __10_ times per session. Do __3_ sessions per day.    C

## 2018-03-12 NOTE — PROGRESS NOTES
"[facility]      Name: Joanne Lino  Clinic Number: [id number]  Date of Treatment: 2018   Diagnosis:   No diagnosis found.   Physician: Tammi Matute, *    Pain pattern determined: 1PEN   , flexion responder  Plan of care signed: Not signed as of 2018   Time in: 1:00  Time Out: 2:00  Total Treatment time: 50  Precautions: HTN/OA, Grade 1-2 spondylolisthesis L4-5  Visit #: 4    POC due: Not signed as of 2018    Reassessment due:3/26/18    Face to Face discussion of patient was done between PT and PTA.     Subjective   Joanne reports 3/10 LB and minimal tingling of her B LE. She purchased an ice pack, ball, and lumbar roll. She has been doing HEP. Pt states she has falling before due to her legs become numb and her legs give out. She did take pain medication today which does decrease her LBP.     Patient reports their pain to be 3/10 on a 0-10 scale with 0 being no pain and 10 being the worst pain imaginable.    Pain Location: B LB/Lumbar    Occupation: No  Leisure: walk for exercise/play with [employer]/golfing/travel                     Pts goals:  "Be able to do more without pain , not fall"     Face to Face discussion of patient was done between PT and PTA.     Objective   Baseline IM Testing Results:   Date of testin18  ROM 18-42 deg   Max Peak Torque 84    Min Peak Torque 39    Flex/Ext Ratio 2.15   % below normative data 55     Femur 5     FOTO: Focus on Therapeutic Outcomes   Category: lumbar   % Impaired: 60%  Current Score  = CK = at least 40% but < 60% impaired, limited or restricted  Goal at Discharge Score = CJ = at least 20% but < 40% impaired, limited or restricted    Treatment    Pt was instructed in and performed the following:     Joanne received therapeutic exercises to develop/improved posture, cardiovascular endurance, muscular endurance, lumbar/cervical ROM, strength and muscular endurance for 50 minutes including " the following exercises:     [facility] 3/12/2018   Visit Number 4   VAS Pain Rating 3   Recumbent Bike Seat Pos. 12   Time 10   Lumbar Extension Seat Pad -   Femur Restraint -   Top Dead Center -   Counterweight -   Lumbar Flexion -   Lumbar Extension 3   Lumbar Peak Torque -   Min Torque -   Percent From Norm -   Lumbar Weight 41   Repetitions 20   Rating of Perceived Exertion 3   Ice - Z Lie (in min.) 10   PPT 10x (Mod v/c to reduce hip extension and bridging)   TANYA 10x   Clam shell 10x (Per hip weakness and pt request for hip strengthening exercises)   LTR 10x  Peripheral muscle strengthening which included 1 set of 15-20 repetitions at a slow, controlled 7 second per rep pace focused on strengthening supporting musculature for improved body mechanics and functional mobility.  Pt and therapist focused on proper form during treatment to ensure optimal strengthening of each targeted muscle group.  Machines were utilized including torso rotation, leg extension, leg curl, chest press, upright row. Tricep extension, bicep curl, leg press, and hip abduction added on third visit.       Joanne received the following manual therapy techniques: none    Home Exercise Program as follows:   Flexion in lying with theraball 10x, 3x/day   PPT 10x   Sidelying clamshells 15x  LTR 10x  Handouts were given to the patient. Pt demo good understanding of the education provided. Joanne demonstrated good return demonstration of activities.     Lumbar roll use compliance: compliant with LR, ice pack, and theraball  Additional exercises taught this treatment session:   Added LTR 10x (Please give pt HEP sheet in chart)  Introduce seated trunk flexion next session.    Assessment      Reviewed HEP exercises. Pt performed with moderate verbal cues for technique detailed above. Introduced her LTR with no c/o LBP. Pt tolerated medx machine with a weight increase and an increase in her extension ROM due to pt extended to to zero  degrees last session. Discussed change with PT and change approved. She exercised all  peripheral resistance exercises with no c/o LBP.  Pt required moderate verbal cues to maintian speed to 7 sec per rep. Pt pain decreased post HEP and session. Introduce seated trunk flexion she can do out in the community when her legs feel weak or numb.    Patient is making fair progress towards established goals.  Pt will continue to benefit from skilled outpatient physical therapy to address the deficits stated in the impairment chart, provide pt/family education and to maximize pt's level of independence in the home and community environment.       Pt's spiritual, cultural and educational needs considered and pt agreeable to plan of care and goals as stated below:     Medical necessity is demonstrated by the following problem list.    Pt presents with the following impairments:   History  Co-morbidities and personal factors that may impact the plan of care Examination  Body Structures and Functions, activity limitations and participation restrictions that may impact the plan of care Clinical Presentation    Decision Making/ Complexity Score   Co-morbidities:   OA/HTN     Personal Factors:   no deficits Body Regions:   back  lower extremities     Body Systems:   gross symmetry  ROM  strength  gross coordinated movement  gait  transfers  transitions  motor control     Activity limitations:   Learning and applying knowledge  no deficits     General Tasks and Commands  no deficits     Communication  no deficits     Mobility  lifting and carrying objects  walking     Self care  no deficits     Domestic Life  no deficits     Interactions/Relationships  no deficits     Life Areas  no deficits     Community and Social Life  community life  recreation and leisure     Participation Restrictions:   Walking/standing > 5-10 min/bending fwd/stairs       evolving clinical presentation with changing clinical characteristics    moderate             GOALS: Pt is in agreement with the following goals.     Short term goals:  6 weeks or 10 visits   1.  Pt will demonstrate increased lumbar ROM by at least 3 degrees from the initial ROM value with improvements noted in functional ROM and ability to perform ADLs  2.  Pt will demonstrate increased maximum isometric torque value by 5% when compared to the initial value resulting in improved ability to perform bending, lifting, and carrying activities safely, confidently.  3.  Patient report a reduction in worst pain score by 1-2 points for improved tolerance during work and recreational activities  4.  Pt able to perform HEP correctly with minimal cueing or supervision for therapist     Long term goals: 13 weeks or 20 visits   1. Pt will demonstrate increased lumbar ROM by at least 6 degrees from initial ROM value, resulting in improved ability to perform functional fwd bending while standing and sitting.   2. Pt will demonstrate increased maximum isometric torque value by 10% when compared to the initial value resulting in improved ability to perform bending, lifting, and carrying activities safely, confidently.  3. Pt to demonstrate ability to independently control and reduce their pain through posture positioning and mechanical movements throughout a typical day.  4.  Patient will demonstrate improved overall function per FOTO Survey to CK = at least 40% but < 60% impaired, limited or restricted score or less.  5. Pt will be able to ambulate 10 minutes without experiencing LE numbness/weakness   6. Pt will be able to stairclimb with RG without pain      Plan   Continue with established Plan of Care towards established PT goals.

## 2018-03-13 ENCOUNTER — TELEPHONE (OUTPATIENT)
Dept: SPINE | Facility: CLINIC | Age: 63
End: 2018-03-13

## 2018-03-13 ENCOUNTER — OFFICE VISIT (OUTPATIENT)
Dept: ORTHOPEDICS | Facility: CLINIC | Age: 63
End: 2018-03-13
Payer: MEDICARE

## 2018-03-13 ENCOUNTER — HOSPITAL ENCOUNTER (OUTPATIENT)
Dept: RADIOLOGY | Facility: HOSPITAL | Age: 63
Discharge: HOME | End: 2018-03-13
Attending: PHYSICIAN ASSISTANT
Payer: MEDICARE

## 2018-03-13 VITALS — HEIGHT: 62 IN | WEIGHT: 198.44 LBS | BODY MASS INDEX: 36.52 KG/M2

## 2018-03-13 DIAGNOSIS — M17.11 PRIMARY OSTEOARTHRITIS OF RIGHT KNEE: Primary | ICD-10-CM

## 2018-03-13 DIAGNOSIS — M25.569 KNEE PAIN, UNSPECIFIED CHRONICITY, UNSPECIFIED LATERALITY: ICD-10-CM

## 2018-03-13 DIAGNOSIS — M51.36 DDD (DEGENERATIVE DISC DISEASE), LUMBAR: ICD-10-CM

## 2018-03-13 DIAGNOSIS — M48.062 SPINAL STENOSIS OF LUMBAR REGION WITH NEUROGENIC CLAUDICATION: Primary | ICD-10-CM

## 2018-03-13 PROCEDURE — 20610 DRAIN/INJ JOINT/BURSA W/O US: CPT | Mod: PBBFAC | Performed by: PHYSICIAN ASSISTANT

## 2018-03-13 PROCEDURE — 99999 PR PBB SHADOW E&M-EST. PATIENT-LVL III: CPT | Mod: PBBFAC,,, | Performed by: PHYSICIAN ASSISTANT

## 2018-03-13 PROCEDURE — 20610 DRAIN/INJ JOINT/BURSA W/O US: CPT | Mod: S$PBB,RT,, | Performed by: PHYSICIAN ASSISTANT

## 2018-03-13 PROCEDURE — 73564 X-RAY EXAM KNEE 4 OR MORE: CPT | Mod: 26,RT,, | Performed by: RADIOLOGY

## 2018-03-13 PROCEDURE — 99213 OFFICE O/P EST LOW 20 MIN: CPT | Mod: PBBFAC,25 | Performed by: PHYSICIAN ASSISTANT

## 2018-03-13 PROCEDURE — 73562 X-RAY EXAM OF KNEE 3: CPT | Mod: 26,59,LT, | Performed by: RADIOLOGY

## 2018-03-13 PROCEDURE — 73562 X-RAY EXAM OF KNEE 3: CPT | Mod: TC,LT,59

## 2018-03-13 PROCEDURE — 99203 OFFICE O/P NEW LOW 30 MIN: CPT | Mod: 25,S$PBB,, | Performed by: PHYSICIAN ASSISTANT

## 2018-03-13 RX ADMIN — Medication 48 MG: at 11:03

## 2018-03-13 NOTE — PROGRESS NOTES
"  SUBJECTIVE:     Chief Complaint : right knee pain    History of Present Illness:  Joanne Lino is a 63 y.o. female seen in clinic today with a chief complaint of chronic atraumatic right knee pain. Patient recently moved from [place] where she was receiving treatment for her knee. She has had SynviscOne several times. Most recent injection was 9/7/2017 and it helped until now. She has medial knee pain. She has difficulty with ADL due to the pain. She takes Lodine. She has back pain and is being followed by Dr. Matute; she is in healthy back program. Attempting to lose weight but exercise is difficult. She has been able to use stationary bicycle in the past with minimal knee pain. Denies swelling.     Past Medical History:   Diagnosis Date    Carpal tunnel syndrome     Depression     Ectopic pregnancy     High cholesterol     Hypertension     Osteoarthritis     PTSD (post-traumatic stress disorder)        Review of Systems:  Constitutional: no fever or chills  ENT: no nasal congestion or sore throat  Respiratory: no cough or shortness of breath  Cardiovascular: no chest pain or palpitations  Gastrointestinal: no nausea or vomiting, tolerating diet  Genitourinary: no hematuria or dysuria  Integument/Breast: no rash or pruritis  Hematologic/Lymphatic: no easy bruising or lymphadenopathy  Musculoskeletal: see HPI  Neurological: no seizures or tremors  Behavioral/Psych: no auditory or visual hallucinations    OBJECTIVE:     PHYSICAL EXAM:  Height 5' 2" (1.575 m), weight 90 kg (198 lb 6.6 oz).   General Appearance: WDWN, NAD  Gait: Abnormal  Neuro/Psych: Mood & affect appropriate  Lungs: Respirations equal and unlabored.   CV: 2+ bilateral upper and lower extremity pulses.   Skin: Intact throughout LE  Extremities: No LE edema    Right Knee Exam  Range of Motion:0-120 active   Effusion:none  Condition of skin:intact  Location of tenderness:Medial joint line   Strength:4 of 5 quadriceps strength and 5 of 5 " hamstring strength  Stability:stable to testing  Yann: negative/negative    Left Knee Exam  Range of Motion:0-135 active   Effusion:none  Condition of skin:intact  Location of tenderness:None   Strength:4 of 5 quadriceps strength and 5 of 5 hamstring strength  Stability:stable to testing  Yann: negative/negative    Alignment: Significiant varus    Right Hip Examination: no pain with PROM     RADIOGRAPHS: AP, lateral and merchant knee x-rays ordered and images reviewed today by me reveal advanced degenerative changes. Changes are tricompartmental.    ASSESSMENT/PLAN:   Primary osteoarthritis of right knee  - Discussed options with patient. Discussed total knee replacement. She is not ready now but will talk to family.   - After all options reviewed she decided to repeat SynviscOne.  - Weight loss  - F/u prn. She was given cards of surgeons so she can schedule with appropriate person if she would like tka.    Procedure Note:  Diagnosis: osteoarthritis knee  After time out was performed and patient ID, side, and site were verified, the  right  knee was sterilly prepped in the standard fashion.  A 22-gauge needle was introduced into right knee joint from an jose luis-lateral site without complication and knee was injected with 6 ml of Synvisc.  Sterile dressing was applied.  The patient was instructed to resume activities as tolerated and to call with any problems.

## 2018-03-13 NOTE — TELEPHONE ENCOUNTER
Called and reviewed the MRI of lumbar spine.  Multilevel DDD with stenosis.  She is still having bilateral leg pain and numbness.  We will try bilateral L5 TF NATALIE

## 2018-03-15 ENCOUNTER — CLINICAL SUPPORT (OUTPATIENT)
Dept: REHABILITATION | Facility: OTHER | Age: 63
End: 2018-03-15
Attending: PHYSICAL MEDICINE & REHABILITATION
Payer: MEDICARE

## 2018-03-15 ENCOUNTER — TELEPHONE (OUTPATIENT)
Dept: SPINE | Facility: CLINIC | Age: 63
End: 2018-03-15

## 2018-03-15 DIAGNOSIS — M54.42 CHRONIC RIGHT-SIDED LOW BACK PAIN WITH BILATERAL SCIATICA: ICD-10-CM

## 2018-03-15 DIAGNOSIS — M54.41 CHRONIC RIGHT-SIDED LOW BACK PAIN WITH BILATERAL SCIATICA: ICD-10-CM

## 2018-03-15 DIAGNOSIS — G89.29 CHRONIC RIGHT-SIDED LOW BACK PAIN WITH BILATERAL SCIATICA: ICD-10-CM

## 2018-03-15 PROCEDURE — 97110 THERAPEUTIC EXERCISES: CPT

## 2018-03-15 NOTE — TELEPHONE ENCOUNTER
----- Message from Deanna Cornejo sent at 3/15/2018  9:53 AM CDT -----  Pt. Schedule 3/22/18 w/ Dr. Thacker.   ----- Message -----  From: Sam Peña MA  Sent: 3/14/2018  11:35 AM  To: Chantel Ferreira, #    Please schedule patient for  L5 TF NATALIE

## 2018-03-15 NOTE — PROGRESS NOTES
"[facility]      Name: Joanne Lino  Clinic Number: [id number]  Date of Treatment: 03/15/2018   Diagnosis:   Encounter Diagnosis   Name Primary?    Chronic right-sided low back pain with bilateral sciatica       Physician: Tammi Matute, *    Pain pattern determined: 1PEN   , flexion responder  Plan of care signed: Not signed as of 03/15/2018   Time in: 12:30  Time Out: 1:30  Total Treatment time: 50  Precautions: HTN/OA, Grade 1-2 spondylolisthesis L4-5  Visit #: 5    POC due: Not signed as of 03/15/2018    Reassessment due:3/26/18    Face to Face discussion of patient was done between PT and PTA.     Subjective   Joanne reports increased L knee due to her arthritis and demo antalgic gait with session.   She purchased an ice pack, ball, and lumbar roll. She has been doing HEP. Pt states she has falling before due to her legs become numb and her legs give out. She c/o increased soreness and pain with LB today she feels it was from last session.     Patient reports their pain to be 5/10 on a 0-10 scale with 0 being no pain and 10 being the worst pain imaginable.    Pain Location: B LB/Lumbar    Occupation: No  Leisure: walk for exercise/play with [employer]/golfing/travel                     Pts goals:  "Be able to do more without pain , not fall"     Face to Face discussion of patient was done between PT and PTA.     Objective   Baseline IM Testing Results:   Date of testin18  ROM 18-42 deg   Max Peak Torque 84    Min Peak Torque 39    Flex/Ext Ratio 2.15   % below normative data 55     Femur 5     FOTO: Focus on Therapeutic Outcomes   Category: lumbar   % Impaired: 60%  Current Score  = CK = at least 40% but < 60% impaired, limited or restricted  Goal at Discharge Score = CJ = at least 20% but < 40% impaired, limited or restricted    Treatment    Pt was instructed in and performed the following:     Joanne received therapeutic exercises to " develop/improved posture, cardiovascular endurance, muscular endurance, lumbar/cervical ROM, strength and muscular endurance for 50 minutes including the following exercises:   [facility] 3/15/2018   Visit Number 5   VAS Pain Rating 5   Recumbent Bike Seat Pos. 12   Time 10   Lumbar Extension Seat Pad -   Femur Restraint -   Top Dead Center -   Counterweight -   Lumbar Flexion -   Lumbar Extension -   Lumbar Peak Torque -   Min Torque -   Percent From Norm -   Lumbar Weight 41   Repetitions 20   Rating of Perceived Exertion 3   Ice - Z Lie (in min.) 10       PPT 10x   TANYA 10x   Clam shell 10x   LTR 10x  Peripheral muscle strengthening which included 1 set of 15-20 repetitions at a slow, controlled 7 second per rep pace focused on strengthening supporting musculature for improved body mechanics and functional mobility.  Pt and therapist focused on proper form during treatment to ensure optimal strengthening of each targeted muscle group.  Machines were utilized including torso rotation, leg extension, leg curl, chest press, upright row. Tricep extension, bicep curl, leg press, and hip abduction added on third visit.       Joanne received the following manual therapy techniques: none    Home Exercise Program as follows:   Flexion in lying with theraball 10x, 3x/day   PPT 10x   Sidelying clamshells 15x  LTR 10x  Handouts were given to the patient. Pt demo good understanding of the education provided. Joanne demonstrated good return demonstration of activities.     Lumbar roll use compliance: compliant with LR, ice pack, and theraball  Additional exercises taught this treatment session:   Added LTR 10x (Please give pt HEP sheet in chart)  Introduce seated trunk flexion next session.    Assessment   Pts pain decreased with session today.  Pt performed with moderate verbal cues today. Pt tolerated medx machine with the same weight and did not increase due to increased pain today with no c/o LBP. She exercised  all  peripheral resistance exercises with no increased LBP.  Pt required moderate verbal cues to maintian speed to 7 sec per rep. Pt pain decreased post HEP and session. Introduce seated trunk flexion she can do out in the community when her legs feel weak or numb.    Patient is making fair progress towards established goals.  Pt will continue to benefit from skilled outpatient physical therapy to address the deficits stated in the impairment chart, provide pt/family education and to maximize pt's level of independence in the home and community environment.       Pt's spiritual, cultural and educational needs considered and pt agreeable to plan of care and goals as stated below:     Medical necessity is demonstrated by the following problem list.    Pt presents with the following impairments:   History  Co-morbidities and personal factors that may impact the plan of care Examination  Body Structures and Functions, activity limitations and participation restrictions that may impact the plan of care Clinical Presentation    Decision Making/ Complexity Score   Co-morbidities:   OA/HTN     Personal Factors:   no deficits Body Regions:   back  lower extremities     Body Systems:   gross symmetry  ROM  strength  gross coordinated movement  gait  transfers  transitions  motor control     Activity limitations:   Learning and applying knowledge  no deficits     General Tasks and Commands  no deficits     Communication  no deficits     Mobility  lifting and carrying objects  walking     Self care  no deficits     Domestic Life  no deficits     Interactions/Relationships  no deficits     Life Areas  no deficits     Community and Social Life  community life  recreation and leisure     Participation Restrictions:   Walking/standing > 5-10 min/bending fwd/stairs       evolving clinical presentation with changing clinical characteristics    moderate            GOALS: Pt is in agreement with the following goals.     Short term  goals:  6 weeks or 10 visits   1.  Pt will demonstrate increased lumbar ROM by at least 3 degrees from the initial ROM value with improvements noted in functional ROM and ability to perform ADLs  2.  Pt will demonstrate increased maximum isometric torque value by 5% when compared to the initial value resulting in improved ability to perform bending, lifting, and carrying activities safely, confidently.  3.  Patient report a reduction in worst pain score by 1-2 points for improved tolerance during work and recreational activities  4.  Pt able to perform HEP correctly with minimal cueing or supervision for therapist     Long term goals: 13 weeks or 20 visits   1. Pt will demonstrate increased lumbar ROM by at least 6 degrees from initial ROM value, resulting in improved ability to perform functional fwd bending while standing and sitting.   2. Pt will demonstrate increased maximum isometric torque value by 10% when compared to the initial value resulting in improved ability to perform bending, lifting, and carrying activities safely, confidently.  3. Pt to demonstrate ability to independently control and reduce their pain through posture positioning and mechanical movements throughout a typical day.  4.  Patient will demonstrate improved overall function per FOTO Survey to CK = at least 40% but < 60% impaired, limited or restricted score or less.  5. Pt will be able to ambulate 10 minutes without experiencing LE numbness/weakness   6. Pt will be able to stairclimb with RG without pain      Plan   Continue with established Plan of Care towards established PT goals.

## 2018-03-20 ENCOUNTER — CLINICAL SUPPORT (OUTPATIENT)
Dept: REHABILITATION | Facility: OTHER | Age: 63
End: 2018-03-20
Attending: PHYSICAL MEDICINE & REHABILITATION
Payer: MEDICARE

## 2018-03-20 DIAGNOSIS — G89.29 CHRONIC RIGHT-SIDED LOW BACK PAIN WITH BILATERAL SCIATICA: ICD-10-CM

## 2018-03-20 DIAGNOSIS — M54.41 CHRONIC RIGHT-SIDED LOW BACK PAIN WITH BILATERAL SCIATICA: ICD-10-CM

## 2018-03-20 DIAGNOSIS — M54.42 CHRONIC RIGHT-SIDED LOW BACK PAIN WITH BILATERAL SCIATICA: ICD-10-CM

## 2018-03-20 PROCEDURE — 97110 THERAPEUTIC EXERCISES: CPT

## 2018-03-20 NOTE — PROGRESS NOTES
"[facility]      Name: Joanne Lino  Clinic Number: [id number]  Date of Treatment: 2018   Diagnosis:   Encounter Diagnosis   Name Primary?    Chronic right-sided low back pain with bilateral sciatica       Physician: Tammi Matute, *    Pain pattern determined: 1PEN   , flexion responder  Plan of care signed: Not signed as of 2018   Time in: 1:05  Time Out: 2:05  Total Treatment time: 50  Precautions: HTN/OA, Grade 1-2 spondylolisthesis L4-5  Visit #: 6    POC due: Not signed as of 2018    Reassessment due:3/26/18    Face to Face discussion of patient was done between PT and PTA.     Subjective   Joanne reports that she is having a bad day today. The pain started today and she is really sore and hurting. She is scheduled to have a epidural Thursday. She is feeling a catching in the low back, feeling discomfort in the hip and leg on the L. Pt has in injection in the R knee not too long ago.    Patient reports their pain to be 5/10 on a 0-10 scale with 0 being no pain and 10 being the worst pain imaginable.    Pain Location: B LB/Lumbar    Occupation: No  Leisure: walk for exercise/play with [employer]/golfing/travel                     Pts goals:  "Be able to do more without pain , not fall"     Face to Face discussion of patient was done between PT and PTA.     Objective   Baseline IM Testing Results:   Date of testin18  ROM 18-42 deg   Max Peak Torque 84    Min Peak Torque 39    Flex/Ext Ratio 2.15   % below normative data 55     Femur 5     FOTO: Focus on Therapeutic Outcomes   Category: lumbar   % Impaired: 60%  Current Score  = CK = at least 40% but < 60% impaired, limited or restricted  Goal at Discharge Score = CJ = at least 20% but < 40% impaired, limited or restricted    Treatment    Pt was instructed in and performed the following:     Joanne received therapeutic exercises to develop/improved posture, cardiovascular endurance, " muscular endurance, lumbar/cervical ROM, strength and muscular endurance for 50 minutes including the following exercises:     [facility] 3/20/2018   Visit Number 6   VAS Pain Rating 6   Recumbent Bike Seat Pos. 12   Time 10   Flexion in Lying 10   Lumbar Extension Seat Pad -   Femur Restraint -   Top Dead Center -   Counterweight -   Lumbar Flexion -   Lumbar Extension -   Lumbar Peak Torque -   Min Torque -   Percent From Norm -   Lumbar Weight 43   Repetitions 18   Rating of Perceived Exertion 5   Ice - Z Lie (in min.) 10         PPT 10x   TANYA 10x   Clam shell 10x   LTR 10x  Peripheral muscle strengthening which included 1 set of 15-20 repetitions at a slow, controlled 7 second per rep pace focused on strengthening supporting musculature for improved body mechanics and functional mobility.  Pt and therapist focused on proper form during treatment to ensure optimal strengthening of each targeted muscle group.  Machines were utilized including torso rotation, leg extension, leg curl, chest press, upright row. Tricep extension, bicep curl, leg press, and hip abduction added on third visit.       Joanne received the following manual therapy techniques: none    Home Exercise Program as follows:   Flexion in lying with theraball 10x, 3x/day   PPT 10x   Sidelying clamshells 15x  LTR 10x  Handouts were given to the patient. Pt demo good understanding of the education provided. Joanne demonstrated good return demonstration of activities.     Lumbar roll use compliance: compliant with LR, ice pack, and theraball  Additional exercises taught this treatment session:   Added LTR 10x (Please give pt HEP sheet in chart)  Introduce seated trunk flexion next session.    Assessment   Pts pain decreased with session today.  Pt performed with moderate verbal cues today. Pt tolerated medx machine with the increased weight and was able to complete 18 reps. She exercised all peripheral resistance exercises with no increased  LBP, and reports that the leg press made her back feel better.  Pt required moderate verbal cues to maintian speed to 7 sec per rep. Pt inquired a bout a knee brace that she could use for the R knee, and it was suggested that for compression a basic neoprene brace would possibly provide comfort.    Patient is making fair progress towards established goals.  Pt will continue to benefit from skilled outpatient physical therapy to address the deficits stated in the impairment chart, provide pt/family education and to maximize pt's level of independence in the home and community environment.       Pt's spiritual, cultural and educational needs considered and pt agreeable to plan of care and goals as stated below:     Medical necessity is demonstrated by the following problem list.    Pt presents with the following impairments:   History  Co-morbidities and personal factors that may impact the plan of care Examination  Body Structures and Functions, activity limitations and participation restrictions that may impact the plan of care Clinical Presentation    Decision Making/ Complexity Score   Co-morbidities:   OA/HTN     Personal Factors:   no deficits Body Regions:   back  lower extremities     Body Systems:   gross symmetry  ROM  strength  gross coordinated movement  gait  transfers  transitions  motor control     Activity limitations:   Learning and applying knowledge  no deficits     General Tasks and Commands  no deficits     Communication  no deficits     Mobility  lifting and carrying objects  walking     Self care  no deficits     Domestic Life  no deficits     Interactions/Relationships  no deficits     Life Areas  no deficits     Community and Social Life  community life  recreation and leisure     Participation Restrictions:   Walking/standing > 5-10 min/bending fwd/stairs       evolving clinical presentation with changing clinical characteristics    moderate            GOALS: Pt is in agreement with the  following goals.     Short term goals:  6 weeks or 10 visits   1.  Pt will demonstrate increased lumbar ROM by at least 3 degrees from the initial ROM value with improvements noted in functional ROM and ability to perform ADLs  2.  Pt will demonstrate increased maximum isometric torque value by 5% when compared to the initial value resulting in improved ability to perform bending, lifting, and carrying activities safely, confidently.  3.  Patient report a reduction in worst pain score by 1-2 points for improved tolerance during work and recreational activities  4.  Pt able to perform HEP correctly with minimal cueing or supervision for therapist     Long term goals: 13 weeks or 20 visits   1. Pt will demonstrate increased lumbar ROM by at least 6 degrees from initial ROM value, resulting in improved ability to perform functional fwd bending while standing and sitting.   2. Pt will demonstrate increased maximum isometric torque value by 10% when compared to the initial value resulting in improved ability to perform bending, lifting, and carrying activities safely, confidently.  3. Pt to demonstrate ability to independently control and reduce their pain through posture positioning and mechanical movements throughout a typical day.  4.  Patient will demonstrate improved overall function per FOTO Survey to CK = at least 40% but < 60% impaired, limited or restricted score or less.  5. Pt will be able to ambulate 10 minutes without experiencing LE numbness/weakness   6. Pt will be able to stairclimb with RG without pain      Plan   Continue with established Plan of Care towards established PT goals.

## 2018-03-22 ENCOUNTER — SURGERY (OUTPATIENT)
Age: 63
End: 2018-03-22

## 2018-03-22 ENCOUNTER — HOSPITAL ENCOUNTER (OUTPATIENT)
Facility: OTHER | Age: 63
Discharge: HOME | End: 2018-03-22
Attending: ANESTHESIOLOGY | Admitting: ANESTHESIOLOGY
Payer: MEDICARE

## 2018-03-22 VITALS
DIASTOLIC BLOOD PRESSURE: 60 MMHG | BODY MASS INDEX: 34.55 KG/M2 | OXYGEN SATURATION: 96 % | WEIGHT: 195 LBS | HEART RATE: 72 BPM | TEMPERATURE: 99 F | HEIGHT: 63 IN | SYSTOLIC BLOOD PRESSURE: 137 MMHG | RESPIRATION RATE: 18 BRPM

## 2018-03-22 DIAGNOSIS — G89.29 CHRONIC PAIN: ICD-10-CM

## 2018-03-22 DIAGNOSIS — M47.26 OSTEOARTHRITIS OF SPINE WITH RADICULOPATHY, LUMBAR REGION: Primary | ICD-10-CM

## 2018-03-22 PROCEDURE — 64483 NJX AA&/STRD TFRM EPI L/S 1: CPT | Mod: 50,,, | Performed by: ANESTHESIOLOGY

## 2018-03-22 PROCEDURE — 63600175 PHARM REV CODE 636 W HCPCS: Performed by: ANESTHESIOLOGY

## 2018-03-22 PROCEDURE — 64483 NJX AA&/STRD TFRM EPI L/S 1: CPT | Mod: 50 | Performed by: ANESTHESIOLOGY

## 2018-03-22 PROCEDURE — 25500020 PHARM REV CODE 255: Performed by: ANESTHESIOLOGY

## 2018-03-22 PROCEDURE — 25000003 PHARM REV CODE 250: Performed by: ANESTHESIOLOGY

## 2018-03-22 RX ORDER — ALPRAZOLAM 0.5 MG/1
1 TABLET, ORALLY DISINTEGRATING ORAL ONCE
Status: COMPLETED | OUTPATIENT
Start: 2018-03-22 | End: 2018-03-22

## 2018-03-22 RX ORDER — DEXAMETHASONE SODIUM PHOSPHATE 100 MG/10ML
INJECTION INTRAMUSCULAR; INTRAVENOUS
Status: DISCONTINUED | OUTPATIENT
Start: 2018-03-22 | End: 2018-03-22 | Stop reason: HOSPADM

## 2018-03-22 RX ORDER — LIDOCAINE HYDROCHLORIDE 10 MG/ML
INJECTION, SOLUTION EPIDURAL; INFILTRATION; INTRACAUDAL; PERINEURAL
Status: DISCONTINUED | OUTPATIENT
Start: 2018-03-22 | End: 2018-03-22 | Stop reason: HOSPADM

## 2018-03-22 RX ORDER — LIDOCAINE HYDROCHLORIDE 10 MG/ML
INJECTION INFILTRATION; PERINEURAL
Status: DISCONTINUED | OUTPATIENT
Start: 2018-03-22 | End: 2018-03-22 | Stop reason: HOSPADM

## 2018-03-22 RX ORDER — SODIUM CHLORIDE 9 MG/ML
500 INJECTION, SOLUTION INTRAVENOUS CONTINUOUS
Status: DISCONTINUED | OUTPATIENT
Start: 2018-03-22 | End: 2018-03-22 | Stop reason: HOSPADM

## 2018-03-22 RX ADMIN — LIDOCAINE HYDROCHLORIDE 10 ML: 10 INJECTION, SOLUTION INFILTRATION; PERINEURAL at 11:03

## 2018-03-22 RX ADMIN — ALPRAZOLAM 1 MG: 0.5 TABLET, ORALLY DISINTEGRATING ORAL at 11:03

## 2018-03-22 RX ADMIN — LIDOCAINE HYDROCHLORIDE 5 ML: 10 INJECTION, SOLUTION EPIDURAL; INFILTRATION; INTRACAUDAL; PERINEURAL at 11:03

## 2018-03-22 RX ADMIN — IOHEXOL 3 ML: 300 INJECTION, SOLUTION INTRAVENOUS at 11:03

## 2018-03-22 RX ADMIN — DEXAMETHASONE SODIUM PHOSPHATE 10 MG: 10 INJECTION INTRAMUSCULAR; INTRAVENOUS at 11:03

## 2018-03-22 NOTE — DISCHARGE INSTRUCTIONS

## 2018-03-22 NOTE — OP NOTE
Date of Service: 03/22/2018    PCP: Aliya Orellana MD    Referring Physician:    Time-out taken to identify patient and procedure side prior to starting the procedure.   I attest that I have reviewed the patient's home medications prior to the procedure and no contraindication have been identified. I  re-evaluated the patient after the patient was positioned for the procedure in the procedure room immediately before the procedural time-out. The vital signs are current and represent the current state of the patient which has not significantly changed since the preprocedure assessment.                                                           PROCEDURE: Bilateral L5 transforaminal epidural steroid injection under fluoroscopy    REASON FOR PROCEDURE: lumbar DJD with radiculopathy  1. Osteoarthritis of spine with radiculopathy, lumbar region    2. Chronic pain        PHYSICIAN: Suresh Thacker MD  ASSISTANTS:Donte Christine MD    MEDICATIONS INJECTED:  Preservative-free dexamethasone 10mg, Xylocaine 1% MPF 3-5ml. 3ml per level. Preservative free, sterile normal saline is used to get larger volume as needed.  LOCAL ANESTHETIC INJECTED:  Xylocaine 1% 9ml with Sodium Bicarbonate 1ml. 3ml per site.    SEDATION MEDICATIONS: none  ESTIMATED BLOOD LOSS:  None.    COMPLICATIONS:  None.    TECHNIQUE:   Laying in a prone position, the patient was prepped and draped in the usual sterile fashion using ChloraPrep and fenestrated drape.  The area to be injected was determined under fluoroscopic guidance.  Local anesthetic was given by raising a wheel and going down to the hub of a 27-gauge 1.25 inch needle.  The 3.5inch 22-gauge spinal needle was introduced towards the transverse process of each above named nerve root level.  The needle was walked medially then hinged into the neural foramen.  Omnipaque was injected to confirm appropriate placement and that there was no vascular runoff.  The medication was then injected after  applying negative pressure. The patient tolerated the procedure well.    PAIN BEFORE THE PROCEDURE: 7/10.    PAIN AFTER THE PROCEDURE: 0/10.    The patient was monitored after the procedure.  Patient was given post procedure and discharge instructions to follow at home.  We will see the patient back in two weeks or the patient may call to inform of status. The patient was discharged in a stable condition.    Donte Christine MD, PGY-2  03/22/2018

## 2018-03-27 ENCOUNTER — CLINICAL SUPPORT (OUTPATIENT)
Dept: REHABILITATION | Facility: OTHER | Age: 63
End: 2018-03-27
Attending: PHYSICAL MEDICINE & REHABILITATION
Payer: MEDICARE

## 2018-03-27 DIAGNOSIS — M54.41 CHRONIC RIGHT-SIDED LOW BACK PAIN WITH BILATERAL SCIATICA: ICD-10-CM

## 2018-03-27 DIAGNOSIS — G89.29 CHRONIC RIGHT-SIDED LOW BACK PAIN WITH BILATERAL SCIATICA: ICD-10-CM

## 2018-03-27 DIAGNOSIS — M54.42 CHRONIC RIGHT-SIDED LOW BACK PAIN WITH BILATERAL SCIATICA: ICD-10-CM

## 2018-03-27 PROCEDURE — 97110 THERAPEUTIC EXERCISES: CPT

## 2018-03-27 NOTE — PROGRESS NOTES
"[facility]      Name: Joanne Lino  Clinic Number: [id number]  Date of Treatment: 2018   Diagnosis:   Encounter Diagnosis   Name Primary?    Chronic right-sided low back pain with bilateral sciatica       Physician: Tammi Matute, *    Pain pattern determined: 1PEN   , flexion responder  Plan of care signed: Not signed as of 2018   Time in: 1:00  Time Out: 2:00  Total Treatment time: 50  Precautions: HTN/OA, Grade 1-2 spondylolisthesis L4-5  Visit #: 7( inc 5%)    POC due: Not signed as of 2018    Reassessment due: 18    Face to Face discussion of patient was done between PT and PTA.     Subjective   Joanne reports she feels a little better after the injection. Pt reports injections were painful but she feels they are going  help    Patient reports their pain to be 2/10 on a 0-10 scale with 0 being no pain and 10 being the worst pain imaginable.    Pain Location: B LB/Lumbar    Occupation: No  Leisure: walk for exercise/play with [employer] or [employer]/travel                     Pts goals:  "Be able to do more without pain , not fall"     Face to Face discussion of patient was done between PT and PTA.     Objective   Baseline IM Testing Results:   Date of testin18  ROM 18-42 deg   Max Peak Torque 84    Min Peak Torque 39    Flex/Ext Ratio 2.15   % below normative data 55     Femur 5     FOTO: Focus on Therapeutic Outcomes   Category: lumbar   % Impaired: 60%  Current Score  = CK = at least 40% but < 60% impaired, limited or restricted  Goal at Discharge Score = CJ = at least 20% but < 40% impaired, limited or restricted    MOVEMENT LOSS 3/27/18    ROM Loss   Flexion WFL   Extension moderate loss c/ pain   Side bending Right Min loss inc pull   Side bending Left minimal loss inc pull   Rotation Right minimal loss   Rotation Left minimal loss          Treatment    Pt was instructed in and performed the following:     Joanne received " therapeutic exercises to develop/improved posture, cardiovascular endurance, muscular endurance, lumbar/cervical ROM, strength and muscular endurance for 40 minutes including the following exercises:   [facility] 3/27/2018   Visit Number 7   VAS Pain Rating 2   Recumbent Bike Seat Pos. 12   Time 10   Flexion in Lying 10   Lumbar Extension Seat Pad -   Femur Restraint -   Top Dead Center -   Counterweight -   Lumbar Flexion -   Lumbar Extension -   Lumbar Peak Torque -   Min Torque -   Percent From Norm -   Lumbar Weight 43   Repetitions 20   Rating of Perceived Exertion 4   Ice - Z Lie (in min.) 10         Bridge 10x  PPT 10x   TANYA 10x   Clam shell 10x   LTR 10x  Peripheral muscle strengthening which included 1 set of 15-20 repetitions at a slow, controlled 7 second per rep pace focused on strengthening supporting musculature for improved body mechanics and functional mobility.  Pt and therapist focused on proper form during treatment to ensure optimal strengthening of each targeted muscle group.  Machines were utilized including torso rotation, leg extension, leg curl, chest press, upright row. Tricep extension, bicep curl, leg press, and hip abduction added on third visit.       Joanne received the following manual therapy techniques: none    Home Exercise Program as follows:   Flexion in lying with theraball 10x, 3x/day   PPT 10x   Sidelying clamshells 15x  LTR 10x  Handouts were given to the patient. Pt demo good understanding of the education provided. Joanne demonstrated good return demonstration of activities.     Lumbar roll use compliance: compliant with LR, ice pack, and theraball  Additional exercises taught this treatment session:   Introduce seated trunk flexion next session.    Assessment   Pt tolerated treatment well.  Able to complete 20 reps at 43# with 4/10 exertion level.  ROM re assessed today and increase evident in flexion and SB B.  Pt encouraged to continue with HEP and encouraged  to begin ambulation program to increase endurance and strength.  Increase by 5% next visit.    Patient is making fair progress towards established goals.  Pt will continue to benefit from skilled outpatient physical therapy to address the deficits stated in the impairment chart, provide pt/family education and to maximize pt's level of independence in the home and community environment.       Pt's spiritual, cultural and educational needs considered and pt agreeable to plan of care and goals as stated below:     Medical necessity is demonstrated by the following problem list.    Pt presents with the following impairments:   History  Co-morbidities and personal factors that may impact the plan of care Examination  Body Structures and Functions, activity limitations and participation restrictions that may impact the plan of care Clinical Presentation    Decision Making/ Complexity Score   Co-morbidities:   OA/HTN     Personal Factors:   no deficits Body Regions:   back  lower extremities     Body Systems:   gross symmetry  ROM  strength  gross coordinated movement  gait  transfers  transitions  motor control     Activity limitations:   Learning and applying knowledge  no deficits     General Tasks and Commands  no deficits     Communication  no deficits     Mobility  lifting and carrying objects  walking     Self care  no deficits     Domestic Life  no deficits     Interactions/Relationships  no deficits     Life Areas  no deficits     Community and Social Life  community life  recreation and leisure     Participation Restrictions:   Walking/standing > 5-10 min/bending fwd/stairs       evolving clinical presentation with changing clinical characteristics    moderate            GOALS: Pt is in agreement with the following goals.     Short term goals:  6 weeks or 10 visits   1.  Pt will demonstrate increased lumbar ROM by at least 3 degrees from the initial ROM value with improvements noted in functional ROM and ability  to perform ADLs  2.  Pt will demonstrate increased maximum isometric torque value by 5% when compared to the initial value resulting in improved ability to perform bending, lifting, and carrying activities safely, confidently.  3.  Patient report a reduction in worst pain score by 1-2 points for improved tolerance during work and recreational activities  4.  Pt able to perform HEP correctly with minimal cueing or supervision for therapist     Long term goals: 13 weeks or 20 visits   1. Pt will demonstrate increased lumbar ROM by at least 6 degrees from initial ROM value, resulting in improved ability to perform functional fwd bending while standing and sitting.   2. Pt will demonstrate increased maximum isometric torque value by 10% when compared to the initial value resulting in improved ability to perform bending, lifting, and carrying activities safely, confidently.  3. Pt to demonstrate ability to independently control and reduce their pain through posture positioning and mechanical movements throughout a typical day.  4.  Patient will demonstrate improved overall function per FOTO Survey to CK = at least 40% but < 60% impaired, limited or restricted score or less.  5. Pt will be able to ambulate 10 minutes without experiencing LE numbness/weakness   6. Pt will be able to stairclimb with RG without pain      Plan   Continue with established Plan of Care towards established PT goals.

## 2018-03-29 ENCOUNTER — TELEPHONE (OUTPATIENT)
Dept: SPINE | Facility: CLINIC | Age: 63
End: 2018-03-29

## 2018-03-29 ENCOUNTER — CLINICAL SUPPORT (OUTPATIENT)
Dept: REHABILITATION | Facility: OTHER | Age: 63
End: 2018-03-29
Attending: PHYSICAL MEDICINE & REHABILITATION
Payer: MEDICARE

## 2018-03-29 DIAGNOSIS — M54.41 CHRONIC RIGHT-SIDED LOW BACK PAIN WITH BILATERAL SCIATICA: ICD-10-CM

## 2018-03-29 DIAGNOSIS — M54.42 CHRONIC RIGHT-SIDED LOW BACK PAIN WITH BILATERAL SCIATICA: ICD-10-CM

## 2018-03-29 DIAGNOSIS — G89.29 CHRONIC RIGHT-SIDED LOW BACK PAIN WITH BILATERAL SCIATICA: ICD-10-CM

## 2018-03-29 PROCEDURE — 97110 THERAPEUTIC EXERCISES: CPT

## 2018-03-29 NOTE — TELEPHONE ENCOUNTER
Patient would like to know if she still should continue PT and medication Gabapentin her pain 4/10 she is feeling pressure in her low back and walking with numbness in left leg and increase pain she also stated that since she had the injection she is feeling better.

## 2018-03-29 NOTE — TELEPHONE ENCOUNTER
She feels like the injection helped on 3/22, but nervous because she had some numbness yesterday with walking, and she hasn't had any since the injection.  She is doing better.  We discussed giving shot 2 weeks to work.  We discussed that we can repeat.  She is feeling better, so encourage her to continue meds and PT

## 2018-03-29 NOTE — PROGRESS NOTES
"[facility]      Name: Joanne Lino  Clinic Number: [id number]  Date of Treatment: 2018   Diagnosis:   Encounter Diagnosis   Name Primary?    Chronic right-sided low back pain with bilateral sciatica       Physician: Tammi Matute, *    Pain pattern determined: 1PEN   , flexion responder  Plan of care signed: Not signed as of 2018   Time in: 1:00  Time Out: 2:00  Total Treatment time: 50  Precautions: HTN/OA, Grade 1-2 spondylolisthesis L4-5  Visit #: 8    POC due: Not signed as of 2018    Reassessment due: 18    Face to Face discussion of patient was done between PT and PTA.     Subjective   Joanne reports she feels a little better after the injection. She reports she continues so have LE numbness with walking. However, she feels like her symptoms are less severe and she is able to walk a little longer before she feels LE numbness and pain.     Patient reports their pain to be 3/10 on a 0-10 scale with 0 being no pain and 10 being the worst pain imaginable.    Pain Location: B LB/Lumbar    Occupation: No  Leisure: walk for exercise/play with [employer]/travel                     Pts goals:  "Be able to do more without pain , not fall"     Face to Face discussion of patient was done between PT and PTA.     Objective   Baseline IM Testing Results:   Date of testin18  ROM 18-42 deg   Max Peak Torque 84    Min Peak Torque 39    Flex/Ext Ratio 2.15   % below normative data 55     Femur 5     FOTO: Focus on Therapeutic Outcomes   Category: lumbar   % Impaired: 60%  Current Score  = CK = at least 40% but < 60% impaired, limited or restricted  Goal at Discharge Score = CJ = at least 20% but < 40% impaired, limited or restricted    MOVEMENT LOSS 3/27/18    ROM Loss   Flexion WFL   Extension moderate loss c/ pain   Side bending Right Min loss inc pull   Side bending Left minimal loss inc pull   Rotation Right minimal loss   Rotation " Left minimal loss          Treatment    Pt was instructed in and performed the following:     Joanne received therapeutic exercises to develop/improved posture, cardiovascular endurance, muscular endurance, lumbar/cervical ROM, strength and muscular endurance for 40 minutes including the following exercises:   [facility] 3/29/2018   Visit Number 8   VAS Pain Rating 3   Recumbent Bike Seat Pos. 12   Time 10   Flexion in Lying 10   Lumbar Extension Seat Pad -   Femur Restraint 7   Top Dead Center -   Counterweight -   Lumbar Flexion -   Lumbar Extension -   Lumbar Peak Torque -   Min Torque -   Percent From Norm -   Lumbar Weight 43   Repetitions 20   Rating of Perceived Exertion 4   Ice - Z Lie (in min.) 10     Bridge 10x  PPT 10x added pillow squeeze  Seated and standing PPT 10x   TANYA 10x   Clam shell 10x   LTR 10x    Peripheral muscle strengthening which included 1 set of 15-20 repetitions at a slow, controlled 7 second per rep pace focused on strengthening supporting musculature for improved body mechanics and functional mobility.  Pt and therapist focused on proper form during treatment to ensure optimal strengthening of each targeted muscle group.  Machines were utilized including torso rotation, leg extension, leg curl, chest press, upright row. Tricep extension, bicep curl, leg press, and hip abduction added on third visit.       Joanne received the following manual therapy techniques: none    Home Exercise Program as follows:   Flexion in lying with theraball 10x, 3x/day   PPT supine, seated, and standing 10x, 3x/day  Sidelying clamshells 15x  LTR 10x     Handouts were given to the patient. Pt demo good understanding of the education provided. Joanne demonstrated good return demonstration of activities.     Lumbar roll use compliance: compliant with LR, ice pack, and theraball  Additional exercises taught this treatment session:   Seated and standing PPT 10x   PPT + pillow squeeze  "10x    Assessment   Pt tolerated treatment well. Pt demo'd HEP with moderate v/c. Focused on core contraction and pelvic tilts +pillow. Attempted PPT in sitting and standing against wall with pt reporting decreased "pressure" in lumbar spine. Added trial to HEP. Pt noted she may use legs more than back muscles. Changed femur setting to 7 with pt reporting improved lumbar paraspinal engagement. Able to complete 20 reps at 43# with 3/10 exertion level.  Increase by 5% next visit. Pt encouraged to continue with HEP and encouraged to begin ambulation program to increase endurance and strength.     Patient is making fair progress towards established goals.  Pt will continue to benefit from skilled outpatient physical therapy to address the deficits stated in the impairment chart, provide pt/family education and to maximize pt's level of independence in the home and community environment.       Pt's spiritual, cultural and educational needs considered and pt agreeable to plan of care and goals as stated below:     Medical necessity is demonstrated by the following problem list.    Pt presents with the following impairments:   History  Co-morbidities and personal factors that may impact the plan of care Examination  Body Structures and Functions, activity limitations and participation restrictions that may impact the plan of care Clinical Presentation    Decision Making/ Complexity Score   Co-morbidities:   OA/HTN     Personal Factors:   no deficits Body Regions:   back  lower extremities     Body Systems:   gross symmetry  ROM  strength  gross coordinated movement  gait  transfers  transitions  motor control     Activity limitations:   Learning and applying knowledge  no deficits     General Tasks and Commands  no deficits     Communication  no deficits     Mobility  lifting and carrying objects  walking     Self care  no deficits     Domestic Life  no deficits     Interactions/Relationships  no deficits     Life " Areas  no deficits     Community and Social Life  community life  recreation and leisure     Participation Restrictions:   Walking/standing > 5-10 min/bending fwd/stairs       evolving clinical presentation with changing clinical characteristics    moderate            GOALS: Pt is in agreement with the following goals.     Short term goals:  6 weeks or 10 visits   1.  Pt will demonstrate increased lumbar ROM by at least 3 degrees from the initial ROM value with improvements noted in functional ROM and ability to perform ADLs  2.  Pt will demonstrate increased maximum isometric torque value by 5% when compared to the initial value resulting in improved ability to perform bending, lifting, and carrying activities safely, confidently.  3.  Patient report a reduction in worst pain score by 1-2 points for improved tolerance during work and recreational activities  4.  Pt able to perform HEP correctly with minimal cueing or supervision for therapist     Long term goals: 13 weeks or 20 visits   1. Pt will demonstrate increased lumbar ROM by at least 6 degrees from initial ROM value, resulting in improved ability to perform functional fwd bending while standing and sitting.   2. Pt will demonstrate increased maximum isometric torque value by 10% when compared to the initial value resulting in improved ability to perform bending, lifting, and carrying activities safely, confidently.  3. Pt to demonstrate ability to independently control and reduce their pain through posture positioning and mechanical movements throughout a typical day.  4.  Patient will demonstrate improved overall function per FOTO Survey to CK = at least 40% but < 60% impaired, limited or restricted score or less.  5. Pt will be able to ambulate 10 minutes without experiencing LE numbness/weakness   6. Pt will be able to stairclimb with RG without pain      Plan   Continue with established Plan of Care towards established PT goals.

## 2018-04-05 ENCOUNTER — CLINICAL SUPPORT (OUTPATIENT)
Dept: REHABILITATION | Facility: OTHER | Age: 63
End: 2018-04-05
Attending: PHYSICAL MEDICINE & REHABILITATION
Payer: MEDICARE

## 2018-04-05 DIAGNOSIS — G89.29 CHRONIC RIGHT-SIDED LOW BACK PAIN WITH BILATERAL SCIATICA: ICD-10-CM

## 2018-04-05 DIAGNOSIS — M54.41 CHRONIC RIGHT-SIDED LOW BACK PAIN WITH BILATERAL SCIATICA: ICD-10-CM

## 2018-04-05 DIAGNOSIS — M54.42 CHRONIC RIGHT-SIDED LOW BACK PAIN WITH BILATERAL SCIATICA: ICD-10-CM

## 2018-04-05 PROCEDURE — 97110 THERAPEUTIC EXERCISES: CPT

## 2018-04-05 NOTE — PROGRESS NOTES
"[facility]      Name: Joanne Lino  Clinic Number: [id number]  Date of Treatment: 2018   Diagnosis:   Encounter Diagnosis   Name Primary?    Chronic right-sided low back pain with bilateral sciatica       Physician: Tammi Matute, *    Pain pattern determined: 1PEN   , flexion responder  Plan of care signed: Not signed as of 2018   Time in: 10:30  Time Out: 11:30  Total Treatment time: 50  Precautions: HTN/OA, Grade 1-2 spondylolisthesis L4-5  Visit #: 9    POC due: Not signed as of 2018    Reassessment due: 18    Face to Face discussion of patient was done between PT and PTA.     Subjective   Joanne reports she is still having a lot of LBP.  She reports she does have less LBP/numbness at times with walking, but c/o increased LBP today. She is not sure why.      Patient reports their pain to be 3/10 on a 0-10 scale with 0 being no pain and 10 being the worst pain imaginable.    Pain Location: B LB/Lumbar    Occupation: No  Leisure: walk for exercise/play with [employer]/travel                     Pts goals:  "Be able to do more without pain , not fall"     Face to Face discussion of patient was done between PT and PTA.     Objective   Baseline IM Testing Results:   Date of testin18  ROM 18-42 deg   Max Peak Torque 84    Min Peak Torque 39    Flex/Ext Ratio 2.15   % below normative data 55     Femur 5     FOTO: Focus on Therapeutic Outcomes   Category: lumbar   % Impaired: 60%  Current Score  = CK = at least 40% but < 60% impaired, limited or restricted  Goal at Discharge Score = CJ = at least 20% but < 40% impaired, limited or restricted    MOVEMENT LOSS 3/27/18    ROM Loss   Flexion WFL   Extension moderate loss c/ pain   Side bending Right Min loss inc pull   Side bending Left minimal loss inc pull   Rotation Right minimal loss   Rotation Left minimal loss          Treatment    Pt was instructed in and performed the " following:     Joanne received therapeutic exercises to develop/improved posture, cardiovascular endurance, muscular endurance, lumbar/cervical ROM, strength and muscular endurance for 40 minutes including the following exercises:     [facility] 4/5/2018   Visit Number 9   VAS Pain Rating 3   Recumbent Bike Seat Pos. 12   Time 10   Flexion in Lying 10   Lumbar Extension Seat Pad -   Femur Restraint -   Top Dead Center -   Counterweight -   Lumbar Flexion -   Lumbar Extension -   Lumbar Peak Torque -   Min Torque -   Percent From Norm -   Lumbar Weight 46   Repetitions 20   Rating of Perceived Exertion 3   Ice - Z Lie (in min.) 10   Bridge 10x  PPT 10x added pillow squeeze  Seated and standing PPT 10x   TANYA 10x   Clam shell 10x   LTR 10x    Peripheral muscle strengthening which included 1 set of 15-20 repetitions at a slow, controlled 7 second per rep pace focused on strengthening supporting musculature for improved body mechanics and functional mobility.  Pt and therapist focused on proper form during treatment to ensure optimal strengthening of each targeted muscle group.  Machines were utilized including torso rotation, leg extension, leg curl, chest press, upright row. Tricep extension, bicep curl, leg press, and hip abduction added on third visit.       Joanne received the following manual therapy techniques: Vacuum/cupping STM with manual therapy techniques was performed to B LE for 5 mins to decrease muscle tightness, increase circulation and promote healing process. The pt's skin was monitored for redness adjusting pressure as needed. The pt was instructed in possible side effects of bruising and/or soreness.     Home Exercise Program as follows:   Flexion in lying with theraball 10x, 3x/day   PPT supine, seated, and standing 10x, 3x/day  Sidelying clamshells 15x  LTR 10x   Bridging 10x    Handouts were given to the patient. Pt demo good understanding of the education provided. Joanne demonstrated  good return demonstration of activities.     Lumbar roll use compliance: compliant with LR, ice pack, and theraball  Additional exercises taught this treatment session:   None    Assessment   Pt tolerated treatment well. Pt demo'd HEP with moderate v/c.  Added cupping to LBP due to increased pain which helped minimal.  Able to complete 20 reps with a weight increase 46# with 3/10 exertion level.  Increase by 5% next visit. Pt encouraged to continue with HEP and ice at home.     Patient is making fair progress towards established goals.  Pt will continue to benefit from skilled outpatient physical therapy to address the deficits stated in the impairment chart, provide pt/family education and to maximize pt's level of independence in the home and community environment.       Pt's spiritual, cultural and educational needs considered and pt agreeable to plan of care and goals as stated below:     Medical necessity is demonstrated by the following problem list.    Pt presents with the following impairments:   History  Co-morbidities and personal factors that may impact the plan of care Examination  Body Structures and Functions, activity limitations and participation restrictions that may impact the plan of care Clinical Presentation    Decision Making/ Complexity Score   Co-morbidities:   OA/HTN     Personal Factors:   no deficits Body Regions:   back  lower extremities     Body Systems:   gross symmetry  ROM  strength  gross coordinated movement  gait  transfers  transitions  motor control     Activity limitations:   Learning and applying knowledge  no deficits     General Tasks and Commands  no deficits     Communication  no deficits     Mobility  lifting and carrying objects  walking     Self care  no deficits     Domestic Life  no deficits     Interactions/Relationships  no deficits     Life Areas  no deficits     Community and Social Life  community life  recreation and leisure     Participation Restrictions:    Walking/standing > 5-10 min/bending fwd/stairs       evolving clinical presentation with changing clinical characteristics    moderate            GOALS: Pt is in agreement with the following goals.     Short term goals:  6 weeks or 10 visits   1.  Pt will demonstrate increased lumbar ROM by at least 3 degrees from the initial ROM value with improvements noted in functional ROM and ability to perform ADLs  2.  Pt will demonstrate increased maximum isometric torque value by 5% when compared to the initial value resulting in improved ability to perform bending, lifting, and carrying activities safely, confidently.  3.  Patient report a reduction in worst pain score by 1-2 points for improved tolerance during work and recreational activities  4.  Pt able to perform HEP correctly with minimal cueing or supervision for therapist     Long term goals: 13 weeks or 20 visits   1. Pt will demonstrate increased lumbar ROM by at least 6 degrees from initial ROM value, resulting in improved ability to perform functional fwd bending while standing and sitting.   2. Pt will demonstrate increased maximum isometric torque value by 10% when compared to the initial value resulting in improved ability to perform bending, lifting, and carrying activities safely, confidently.  3. Pt to demonstrate ability to independently control and reduce their pain through posture positioning and mechanical movements throughout a typical day.  4.  Patient will demonstrate improved overall function per FOTO Survey to CK = at least 40% but < 60% impaired, limited or restricted score or less.  5. Pt will be able to ambulate 10 minutes without experiencing LE numbness/weakness   6. Pt will be able to stairclimb with RG without pain      Plan   Continue with established Plan of Care towards established PT goals.

## 2018-04-10 ENCOUNTER — CLINICAL SUPPORT (OUTPATIENT)
Dept: REHABILITATION | Facility: OTHER | Age: 63
End: 2018-04-10
Attending: PHYSICAL MEDICINE & REHABILITATION
Payer: MEDICARE

## 2018-04-10 DIAGNOSIS — M54.42 CHRONIC RIGHT-SIDED LOW BACK PAIN WITH BILATERAL SCIATICA: ICD-10-CM

## 2018-04-10 DIAGNOSIS — M54.41 CHRONIC RIGHT-SIDED LOW BACK PAIN WITH BILATERAL SCIATICA: ICD-10-CM

## 2018-04-10 DIAGNOSIS — G89.29 CHRONIC RIGHT-SIDED LOW BACK PAIN WITH BILATERAL SCIATICA: ICD-10-CM

## 2018-04-10 PROCEDURE — 97110 THERAPEUTIC EXERCISES: CPT

## 2018-04-10 NOTE — PROGRESS NOTES
"[facility]      Name: Joanne Lino  Clinic Number: [id number]  Date of Treatment: 04/10/2018   Diagnosis:   Encounter Diagnosis   Name Primary?    Chronic right-sided low back pain with bilateral sciatica       Physician: Tammi Matute, *    Pain pattern determined: 1PEP (changed after reassessment 4/10/18 per centralization of symptoms with EIL)     Plan of care signed: Not signed as of 04/10/2018   Time in: 1:05  Time Out: 2:15  Total Treatment time: 75  Precautions: HTN/OA, Grade 1-2 spondylolisthesis L4-5  Visit #: 10 Plan to retest as able next session.     POC due: Not signed as of 04/10/2018    Reassessment done: 4/10/18  Reassessment due: 5/10/18    Face to Face discussion of patient was done between PT and PTA.     Subjective   Joanne reports she is having severe low back pain and LE numbness and tingling. Pt expresses distress and fear of increase in symptoms. Pt encouraged to attempt treatment with significant reduction in symptoms with STM, manual mobilization, exercise, and rest in z-lie position. She reports increased leg symptoms following last treatment session and mopping at home. Symptoms have been worsening over the past 4 days.     Patient reports their pain to be 10/10 on a 0-10 scale with 0 being no pain and 10 being the worst pain imaginable.    Pain Location: B LB/Lumbar    Occupation: No  Leisure: walk for exercise/play with [employer]/golfing/travel                     Pts goals:  "Be able to do more without pain , not fall"     Face to Face discussion of patient was done between PT and PTA.     Objective   Baseline IM Testing Results:   Date of testin18  ROM 18-42 deg   Max Peak Torque 84    Min Peak Torque 39    Flex/Ext Ratio 2.15   % below normative data 55     Femur 5     FOTO: Focus on Therapeutic Outcomes   Category: lumbar   % Impaired: 60%  Current Score  = CK = at least 40% but < 60% impaired, limited or " restricted  Goal at Discharge Score = CJ = at least 20% but < 40% impaired, limited or restricted    VISIT 5: 62%  VISIT 10: not completed     MOVEMENT LOSS 3/27/18    ROM Loss   Flexion WFL   Extension moderate loss c/ pain   Side bending Right Min loss inc pull   Side bending Left minimal loss inc pull   Rotation Right minimal loss   Rotation Left minimal loss          Treatment    Pt was instructed in and performed the following:     Joanne received therapeutic exercises to develop/improved posture, cardiovascular endurance, muscular endurance, lumbar/cervical ROM, strength and muscular endurance for 40 minutes including the following exercises:     [facility] 4/10/2018   Visit Number 10   VAS Pain Rating 10   Recumbent Bike Seat Pos. -   Time -   Extension in Lying 10   Flexion in Lying -   Manual Therapy 10   Lumbar Extension Seat Pad -   Femur Restraint -   Top Dead Center -   Counterweight -   Lumbar Flexion -   Lumbar Extension -   Lumbar Peak Torque -   Min Torque -   Percent From Norm -   Lumbar Weight 46   Repetitions 20   Rating of Perceived Exertion 3   Ice - Z Lie (in min.) 10     EIL 10 (centralization of LE symptoms)     Peripheral muscle strengthening which included 1 set of 15-20 repetitions at a slow, controlled 7 second per rep pace focused on strengthening supporting musculature for improved body mechanics and functional mobility.  Pt and therapist focused on proper form during treatment to ensure optimal strengthening of each targeted muscle group.  Machines were utilized including torso rotation, leg extension, leg curl, chest press, upright row. Tricep extension, bicep curl. Leg press, and hip abduction deferred secondary to increased pain this session.       Joanne received the following manual therapy techniques: Vacuum/cupping STM with manual therapy techniques was performed to B LE for 3 mins to decrease muscle tightness, increase circulation and promote healing process. The  pt's skin was monitored for redness adjusting pressure as needed. The pt was instructed in possible side effects of bruising and/or soreness. Also performed manual grade 2-3 PAs L3-L5 with improved symptoms. STM to lumbar paraspinal muscles with improved symptoms from 10/10 to 4/10.     Home Exercise Program as follows:   Flexion in lying with theraball 10x, 3x/day   PPT supine, seated, and standing 10x, 3x/day  Sidelying clamshells 15x  LTR 10x   Bridging 10x  EIL 10x     Handouts were given to the patient. Pt demo good understanding of the education provided. Joanne demonstrated good return demonstration of activities.     Lumbar roll use compliance: compliant with LR, ice pack, and theraball  Additional exercises taught this treatment session:   EIL 10x     Assessment   Pt tolerated treatment very well with significant reduction in symptoms. Pt presented to PT in severe pain with LE n/t and distress. Pt denies changes in bowel and bladder, significant weakness of LE. Encouraged pt to attempt STM and manual mobilization with centralization of symptoms with lower lumbar PAs and repeated lumbar extensions. Pt reported 10/10 reduced to 2/10 symptoms by end of session. Able to complete 20 reps with a weight increase 46# with 3/10 exertion level. Pt encouraged to continue with HEP and ice at home.     Patient is making fair progress towards established goals.  Pt will continue to benefit from skilled outpatient physical therapy to address the deficits stated in the impairment chart, provide pt/family education and to maximize pt's level of independence in the home and community environment.       Pt's spiritual, cultural and educational needs considered and pt agreeable to plan of care and goals as stated below:     Medical necessity is demonstrated by the following problem list.    Pt presents with the following impairments:   History  Co-morbidities and personal factors that may impact the plan of care  Examination  Body Structures and Functions, activity limitations and participation restrictions that may impact the plan of care Clinical Presentation    Decision Making/ Complexity Score   Co-morbidities:   OA/HTN     Personal Factors:   no deficits Body Regions:   back  lower extremities     Body Systems:   gross symmetry  ROM  strength  gross coordinated movement  gait  transfers  transitions  motor control     Activity limitations:   Learning and applying knowledge  no deficits     General Tasks and Commands  no deficits     Communication  no deficits     Mobility  lifting and carrying objects  walking     Self care  no deficits     Domestic Life  no deficits     Interactions/Relationships  no deficits     Life Areas  no deficits     Community and Social Life  community life  recreation and leisure     Participation Restrictions:   Walking/standing > 5-10 min/bending fwd/stairs       evolving clinical presentation with changing clinical characteristics    moderate            GOALS: Pt is in agreement with the following goals.     Short term goals:  6 weeks or 10 visits   1.  Pt will demonstrate increased lumbar ROM by at least 3 degrees from the initial ROM value with improvements noted in functional ROM and ability to perform ADLs  2.  Pt will demonstrate increased maximum isometric torque value by 5% when compared to the initial value resulting in improved ability to perform bending, lifting, and carrying activities safely, confidently.  3.  Patient report a reduction in worst pain score by 1-2 points for improved tolerance during work and recreational activities  4.  Pt able to perform HEP correctly with minimal cueing or supervision for therapist     Long term goals: 13 weeks or 20 visits   1. Pt will demonstrate increased lumbar ROM by at least 6 degrees from initial ROM value, resulting in improved ability to perform functional fwd bending while standing and sitting.   2. Pt will demonstrate increased  maximum isometric torque value by 10% when compared to the initial value resulting in improved ability to perform bending, lifting, and carrying activities safely, confidently.  3. Pt to demonstrate ability to independently control and reduce their pain through posture positioning and mechanical movements throughout a typical day.  4.  Patient will demonstrate improved overall function per FOTO Survey to CK = at least 40% but < 60% impaired, limited or restricted score or less.  5. Pt will be able to ambulate 10 minutes without experiencing LE numbness/weakness   6. Pt will be able to stairclimb with RG without pain      Plan   Continue with established Plan of Care towards established PT goals.

## 2018-04-12 ENCOUNTER — CLINICAL SUPPORT (OUTPATIENT)
Dept: REHABILITATION | Facility: OTHER | Age: 63
End: 2018-04-12
Attending: PHYSICAL MEDICINE & REHABILITATION
Payer: MEDICARE

## 2018-04-12 DIAGNOSIS — G89.29 CHRONIC RIGHT-SIDED LOW BACK PAIN WITH BILATERAL SCIATICA: ICD-10-CM

## 2018-04-12 DIAGNOSIS — M54.41 CHRONIC RIGHT-SIDED LOW BACK PAIN WITH BILATERAL SCIATICA: ICD-10-CM

## 2018-04-12 DIAGNOSIS — M54.42 CHRONIC RIGHT-SIDED LOW BACK PAIN WITH BILATERAL SCIATICA: ICD-10-CM

## 2018-04-12 PROCEDURE — G8978 MOBILITY CURRENT STATUS: HCPCS | Mod: CL

## 2018-04-12 PROCEDURE — G8979 MOBILITY GOAL STATUS: HCPCS | Mod: CK

## 2018-04-12 PROCEDURE — 97110 THERAPEUTIC EXERCISES: CPT

## 2018-04-12 PROCEDURE — 97750 PHYSICAL PERFORMANCE TEST: CPT

## 2018-04-12 NOTE — PROGRESS NOTES
"[facility] Treatment      Name: Joanne Lino  Clinic Number: [id number]  Date of Treatment: 2018   Diagnosis:   Encounter Diagnosis   Name Primary?    Chronic right-sided low back pain with bilateral sciatica       Physician: Tammi Matute, *    Pain pattern determined: 1PEP (changed after reassessment 4/10/18 per centralization of symptoms with EIL)     Plan of care signed: Not signed as of 2018   Time in: 1:05  Time Out: 2:15  Total Treatment time: 75  Precautions: HTN/OA, Grade 1-2 spondylolisthesis L4-5  Visit #: 11    POC due: Not signed as of 2018    Reassessment done: 4/10/18, 18  Reassessment due: 18    Face to Face discussion of patient was done between PT and PTA.     Subjective   Joanne reports she has improved low back pain and denies current LE numbness and tingling. She continues to have some tailbone and left referral pain into her posterior thigh. She reports feeling better by end of treatment session (reduced to 2/10 pain).     Patient reports their pain to be 4/10 on a 0-10 scale with 0 being no pain and 10 being the worst pain imaginable.    Pain Location: B LB/Lumbar    Occupation: No  Leisure: walk for exercise/play with [employer]/golfing/travel                     Pts goals:  "Be able to do more without pain , not fall"     Face to Face discussion of patient was done between PT and PTA.     Objective   Baseline IM Testing Results:   Date of testin18  ROM 18-42 deg   Max Peak Torque 84    Min Peak Torque 39    Flex/Ext Ratio 2.15   % below normative data 55     Femur 5      Midpoint IM Testing Results:   Date of testin2018  ROM 33-6   Max Peak Torque 105   Min Peak Torque 55   Flex/Ext Ratio 1.9   % below relative normative data -51   % Change from Initial Evaluation +61          FOTO: Focus on Therapeutic Outcomes   Category: lumbar   % Impaired: 60%  Current Score  = CK = at least 40% but < 60% " impaired, limited or restricted  Goal at Discharge Score = CJ = at least 20% but < 40% impaired, limited or restricted    VISIT 5: 62%  VISIT 10: not completed     MOVEMENT LOSS 3/27/18    ROM Loss   Flexion WFL   Extension moderate loss c/ pain   Side bending Right Min loss inc pull   Side bending Left minimal loss inc pull   Rotation Right minimal loss   Rotation Left minimal loss          Treatment    Pt was instructed in and performed the following:     Joanne received therapeutic exercises to develop/improved posture, cardiovascular endurance, muscular endurance, lumbar/cervical ROM, strength and muscular endurance for 40 minutes including the following exercises:    [facility] 4/12/2018   Visit Number 11   VAS Pain Rating 4   Recumbent Bike Seat Pos. 12   Time 5   Extension in Lying 10   Flexion in Lying 10   Manual Therapy 10   Lumbar Extension Seat Pad -   Femur Restraint -   Top Dead Center -   Counterweight -   Lumbar Flexion 33   Lumbar Extension 6   Lumbar Peak Torque 105   Min Torque 55   Percent From Norm -   Percent Change from Initial 61   Lumbar Weight 35   Repetitions -   Rating of Perceived Exertion -   Ice - Z Lie (in min.) 10         EIL 10x increased left posterior thigh symptoms, no n/t today    Peripheral muscle strengthening which included 1 set of 15-20 repetitions at a slow, controlled 7 second per rep pace focused on strengthening supporting musculature for improved body mechanics and functional mobility.  Pt and therapist focused on proper form during treatment to ensure optimal strengthening of each targeted muscle group.  Machines were utilized including torso rotation, leg extension, leg curl, chest press, upright row. Tricep extension, bicep curl. Leg press, and hip abduction performed without difficulty today.       Joanne received the following manual therapy techniques: Vacuum/cupping STM with manual therapy techniques was performed to B LE for 3 mins to decrease muscle  tightness, increase circulation and promote healing process. The pt's skin was monitored for redness adjusting pressure as needed. The pt was instructed in possible side effects of bruising and/or soreness. Also performed manual grade 2-3 PAs L3-L5 with improved symptoms. STM to lumbar paraspinal muscles with improved symptoms from 4/10 to 3/10.     Home Exercise Program as follows:   Flexion in lying with theraball 10x, 3x/day   PPT supine, seated, and standing 10x, 3x/day  Sidelying clamshells 15x  LTR 10x   Bridging 10x  EIL 10x     Handouts were given to the patient. Pt demo good understanding of the education provided. Joanne demonstrated good return demonstration of activities.     Lumbar roll use compliance: compliant with LR, ice pack, and theraball  Additional exercises taught this treatment session:   None     Assessment   Patient has attended 10 visits at [facility] which included MD evaluation, PT evaluation with isometric testing, and physical therapy treatment including HEP instruction, education, aerobic work, dynamic strengthening on med ex equipment for the spine, and whole body strengthening on med ex equipment with increasing weight loads.  Patient  is demonstrating increased ability to reduce symptoms, improved posture, improved lumbar ROM by 6 degrees, and improved lumbar strength on med ex test by  61 average. Pt demonstrates reduced flexion ROM but admits she may have been fearful of full flexion on today's session. Plan to reassess.     Pt tolerated treatment very well with mild reduction in symptoms. Pt reported 4/10 reduced to 2/10 symptoms by end of session. Able to complete isometric testing without increased symptoms today. Pt encouraged to continue with HEP and ice at home.     Patient is making fair progress towards established goals.  Pt will continue to benefit from skilled outpatient physical therapy to address the deficits stated in the impairment chart, provide pt/family  education and to maximize pt's level of independence in the home and community environment.       Pt's spiritual, cultural and educational needs considered and pt agreeable to plan of care and goals as stated below:     Medical necessity is demonstrated by the following problem list.    Pt presents with the following impairments:   History  Co-morbidities and personal factors that may impact the plan of care Examination  Body Structures and Functions, activity limitations and participation restrictions that may impact the plan of care Clinical Presentation    Decision Making/ Complexity Score   Co-morbidities:   OA/HTN     Personal Factors:   no deficits Body Regions:   back  lower extremities     Body Systems:   gross symmetry  ROM  strength  gross coordinated movement  gait  transfers  transitions  motor control     Activity limitations:   Learning and applying knowledge  no deficits     General Tasks and Commands  no deficits     Communication  no deficits     Mobility  lifting and carrying objects  walking     Self care  no deficits     Domestic Life  no deficits     Interactions/Relationships  no deficits     Life Areas  no deficits     Community and Social Life  community life  recreation and leisure     Participation Restrictions:   Walking/standing > 5-10 min/bending fwd/stairs       evolving clinical presentation with changing clinical characteristics    moderate            GOALS: Pt is in agreement with the following goals.     Short term goals:  6 weeks or 10 visits   1.  Pt will demonstrate increased lumbar ROM by at least 3 degrees from the initial ROM value with improvements noted in functional ROM and ability to perform ADLs  2.  Pt will demonstrate increased maximum isometric torque value by 5% when compared to the initial value resulting in improved ability to perform bending, lifting, and carrying activities safely, confidently.  3.  Patient report a reduction in worst pain score by 1-2 points for  improved tolerance during work and recreational activities  4.  Pt able to perform HEP correctly with minimal cueing or supervision for therapist     Long term goals: 13 weeks or 20 visits   1. Pt will demonstrate increased lumbar ROM by at least 6 degrees from initial ROM value, resulting in improved ability to perform functional fwd bending while standing and sitting.   2. Pt will demonstrate increased maximum isometric torque value by 10% when compared to the initial value resulting in improved ability to perform bending, lifting, and carrying activities safely, confidently.  3. Pt to demonstrate ability to independently control and reduce their pain through posture positioning and mechanical movements throughout a typical day.  4.  Patient will demonstrate improved overall function per FOTO Survey to CK = at least 40% but < 60% impaired, limited or restricted score or less.  5. Pt will be able to ambulate 10 minutes without experiencing LE numbness/weakness   6. Pt will be able to stairclimb with RG without pain      Plan   Continue with established Plan of Care towards established PT goals.

## 2018-04-16 ENCOUNTER — OFFICE VISIT (OUTPATIENT)
Dept: SPINE | Facility: CLINIC | Age: 63
End: 2018-04-16
Attending: PHYSICAL MEDICINE & REHABILITATION
Payer: MEDICARE

## 2018-04-16 VITALS
HEIGHT: 63 IN | BODY MASS INDEX: 38.27 KG/M2 | SYSTOLIC BLOOD PRESSURE: 133 MMHG | WEIGHT: 216 LBS | HEART RATE: 115 BPM | DIASTOLIC BLOOD PRESSURE: 65 MMHG

## 2018-04-16 DIAGNOSIS — M47.26 OSTEOARTHRITIS OF SPINE WITH RADICULOPATHY, LUMBAR REGION: ICD-10-CM

## 2018-04-16 DIAGNOSIS — G89.29 CHRONIC RIGHT-SIDED LOW BACK PAIN WITH BILATERAL SCIATICA: ICD-10-CM

## 2018-04-16 DIAGNOSIS — M54.41 CHRONIC RIGHT-SIDED LOW BACK PAIN WITH BILATERAL SCIATICA: ICD-10-CM

## 2018-04-16 DIAGNOSIS — R29.818 NEUROGENIC CLAUDICATION: ICD-10-CM

## 2018-04-16 DIAGNOSIS — M54.42 CHRONIC RIGHT-SIDED LOW BACK PAIN WITH BILATERAL SCIATICA: ICD-10-CM

## 2018-04-16 DIAGNOSIS — M48.062 SPINAL STENOSIS OF LUMBAR REGION WITH NEUROGENIC CLAUDICATION: Primary | ICD-10-CM

## 2018-04-16 DIAGNOSIS — M46.00 SPINAL ENTHESOPATHY: ICD-10-CM

## 2018-04-16 DIAGNOSIS — M51.36 DDD (DEGENERATIVE DISC DISEASE), LUMBAR: ICD-10-CM

## 2018-04-16 PROCEDURE — 99999 PR PBB SHADOW E&M-EST. PATIENT-LVL III: CPT | Mod: PBBFAC,,, | Performed by: PHYSICAL MEDICINE & REHABILITATION

## 2018-04-16 PROCEDURE — 99214 OFFICE O/P EST MOD 30 MIN: CPT | Mod: S$PBB,,, | Performed by: PHYSICAL MEDICINE & REHABILITATION

## 2018-04-16 PROCEDURE — 99213 OFFICE O/P EST LOW 20 MIN: CPT | Mod: PBBFAC | Performed by: PHYSICAL MEDICINE & REHABILITATION

## 2018-04-16 NOTE — PROGRESS NOTES
Subjective:      Patient ID: Joanne Lino is a 63 y.o. female.    Chief Complaint: Back Pain    Referred by: No ref. provider found     Ms Lino is a 62 yo female here for follow up of her low back and right leg pain that started on 2/1/2018 when she got up after sitting and fell to the ground with leg numbness.  She went to the ER on 2/2/2018 and was given a toradol shot nsaid and muscle relaxer.   She has had episodes of back and leg tingling off and on for the past 10 years, but she has never fallen.  She was seen by me on 2/5/2018 and then on 2/15/2018 we did a right iliolumbar injection with 100% relief for 2-3 days and she has been to 2 PT sessions.  She was then seen by me on 3/8/2018 and she was having more numbness and could not stand and walk too far.  we ordered an MRI and sent her for bilateral L5 TF NATALIE.  She was continued in PT, she ahs been to 11 visits.  Bilateral L5 TF was done on 3/22/2018.  She felt like the injection helped about 85% for about 2 weeks.  Then the pain started coming back last week, then yesterday she started feeling more numbness in the legs with standing and walking.  She feels like she is not back to where she was.  She is not having shooting pains but feels wobbly.  The pain is worse with standing and walking.  The pain gets better when you sit down. Pain is 4/10 now, worst 9/10 standing and walking in back, best 1/10 last week.  The pain goes down to the top of the feet.      X-ray lumbar 2/2/2018  Findings:   No fracture identified.  Grade 1-2 spondylolisthesis L4-5 with disc space narrowing.  Facet arthropathy L4-5 and L5-S1.  Degenerative disc changes vacuum phenomenon multilevel sparing only the L3-4 disc space.      Impression      1.  Multilevel degenerative disc and facet changes.     MRI lumbar 3/2018  There are several hemangiomas the bodies of lumbar vertebra.  Paraspinal soft tissues are unremarkable.  The cord ends at L1.    At T12-L1 normal disc, central canal,  foramina and facets.    At L1-2 moderate loss of disc height with disc desiccation.  Concentric herniation of disc, anteriorly a disc spur complex.  Posteriorly a broad-based contained extrusion 3.5 mm thickness, no significant central canal or foraminal stenosis.  Facets are unremarkable.    At L2-3 moderate loss of disc height with disc desiccation.  A large herniation posteriorly, a contained protrusion 6.5 mm thickness, is demonstrated and there is ligamentous hypertrophy.  The central canal AP diameter is 8 mm.  There is bilateral foraminal perineural fat.  Facet degeneration right side with hypertrophy.    At L3-4 normal disc height and signal.  Broad-base contained extrusion of disc, 4 mm thickness, and ligamentous hypertrophy posteriorly.  AP central canal diameter 9.5 mm.  No significant foraminal stenosis with perineural fat noted bilaterally.  Facet degeneration hypertrophy right side.    At L4-5 normal disc height with partial desiccation.  Grade 1 spondylolisthesis with severe facet and ligamentous degeneration and hypertrophy.  AP central canal diameter 8.2 mm.  50% loss of perineural fat right foramen, adequate perineural fat left foramen.    At L5-S1 loss of normal disc height with disc desiccation.  The herniation is broad-based, a contained protrusion 3.3 mm thick, and with ligamentous and facet hypertrophy.  AP central canal diameter 11 mm.  Loss of perineural fat in the foramina, bilaterally.  Facet degeneration.    Normal sacroiliac joints.  Impression       1. Multilevel discogenic and spondylitic disease with varying degrees of central canal and/or foraminal stenoses as described.  2. Grade 1 spondylolisthesis L4-5.  Spondylolysis not identified but there is severe facet degeneration at this level especially.  3. Multilevel facet osteoarthropathy.      Past Medical History:  No date: Carpal tunnel syndrome  No date: Depression  No date: Ectopic pregnancy  No date: High cholesterol  No date:  Hypertension  No date: Osteoarthritis  No date: PTSD (post-traumatic stress disorder)    Past Surgical History:  No date: OOPHORECTOMY    Review of patient's family history indicates:    Cancer                         Mother                    Heart disease                  Mother                    Heart disease                  Father                    Cancer                         Daughter                    Social History    Marital status:              Spouse name:                       Years of education:                 Number of children:               Social History Main Topics    Smoking status: Never Smoker                                                                Smokeless tobacco: Never Used                        Alcohol use: Yes                Comment: Socially    Drug use: No                Current Outpatient Prescriptions:  b complex vitamins tablet, Take 1 tablet by mouth once daily., Disp: , Rfl:    CALCIUM CARBONATE (CALCIUM 500 ORAL), Take by mouth., Disp: , Rfl:   clonazePAM (KLONOPIN) 0.5 MG tablet, Take 0.25 mg by mouth 2 (two) times daily., Disp: , Rfl:   cyclobenzaprine (FLEXERIL) 10 MG tablet, Take 0.5-1 tablets (5-10 mg total) by mouth 3 (three) times daily as needed for Muscle spasms., Disp: 90 tablet, Rfl: 0  etodolac (LODINE) 200 MG Cap, Take 1 capsule (200 mg total) by mouth 3 (three) times daily., Disp: 90 capsule, Rfl: 1  multivitamin capsule, Take 1 capsule by mouth once daily., Disp: , Rfl:   simvastatin (ZOCOR) 40 MG tablet, Take 40 mg by mouth once daily. , Disp: , Rfl:   telmisartan (MICARDIS) 80 MG Tab, Take by mouth once daily. , Disp: , Rfl:   traMADol (ULTRAM) 50 mg tablet, Take by mouth 2 (two) times daily as needed. , Disp: , Rfl:   traZODone (DESYREL) 100 MG tablet, Take 200 mg by mouth every evening., Disp: , Rfl:   vitamin D 1000 units Tab, Take 1,000 Units by mouth once daily., Disp: , Rfl:     No current facility-administered medications for this visit.        Review of patient's allergies indicates:   -- Codeine -- Nausea Only   -- Pcn (penicillins) -- Itching                Review of Systems   Constitution: Negative for weight gain and weight loss.   Cardiovascular: Negative for chest pain.   Respiratory: Negative for shortness of breath.    Musculoskeletal: Positive for back pain. Negative for joint pain and joint swelling.   Gastrointestinal: Negative for abdominal pain and bowel incontinence.   Genitourinary: Negative for bladder incontinence.   Neurological: Positive for paresthesias (bilateral legs). Negative for numbness.           Objective:          General    Vitals reviewed.  Constitutional: She is oriented to person, place, and time. She appears well-developed and well-nourished.   HENT:   Head: Normocephalic and atraumatic.   Pulmonary/Chest: Effort normal.   Neurological: She is alert and oriented to person, place, and time.   Psychiatric: She has a normal mood and affect. Her behavior is normal. Judgment and thought content normal.     General Musculoskeletal Exam   Gait: normal (varus deformity of right knee)     Right Ankle/Foot Exam     Tests   Heel Walk: able to perform  Tiptoe Walk: able to perform    Left Ankle/Foot Exam     Tests   Heel Walk: able to perform  Tiptoe Walk: able to perform  Back (L-Spine & T-Spine) / Neck (C-Spine) Exam     Back (L-Spine & T-Spine) Range of Motion   Extension: 20   Flexion: 90   Lateral Bend Right: 20   Lateral Bend Left: 20   Rotation Right: 40   Rotation Left: 40     Spinal Sensation   Right Side Sensation  C-Spine Level: normal   L-Spine Level: normal  S-Spine Level: normal  Left Side Sensation  C-Spine Level: normal  L-Spine Level: normal  S-Spine Level: normal    Back (L-Spine & T-Spine) Tests   Right Side Tests  Straight leg raise:      Sitting SLR: > 70 degrees      Left Side Tests  Straight leg raise:     Sitting SLR: > 70 degrees          Other She has no scoliosis .  Spinal Kyphosis:   Absent      Muscle Strength   Right Upper Extremity   Biceps: 5/5/5   Deltoid:  5/5  Triceps:  5/5  Wrist Extension: 5/5/5   Finger Flexors:  5/5  Left Upper Extremity  Biceps: 5/5/5   Deltoid:  5/5  Triceps:  5/5  Wrist Extension: 5/5/5   Finger Flexors:  5/5  Right Lower Extremity   Hip Flexion: 5/5   Quadriceps:  5/5   Anterior tibial:  5/5/5  EHL:  5/5  Left Lower Extremity   Hip Flexion: 5/5   Quadriceps:  5/5   Anterior tibial:  5/5/5   EHL:  5/5    Reflexes     Left Side  Biceps:  2+  Triceps:  2+  Brachioradialis:  2+  Quadriceps:  2+  Achilles:  2+  Left Marks's Sign:  Absent  Babinski Sign:  absent    Right Side   Biceps:  2+  Triceps:  2+  Brachioradialis:  2+  Quadriceps:  2+  Achilles:  2+  Right Marks's Sign:  absent  Babinski Sign:  absent    Vascular Exam     Right Pulses        Carotid:                  2+    Left Pulses        Carotid:                  2+        Assessment:       Encounter Diagnoses   Name Primary?    Spinal stenosis of lumbar region with neurogenic claudication Yes    DDD (degenerative disc disease), lumbar     Chronic right-sided low back pain with bilateral sciatica     Spinal enthesopathy     Neurogenic claudication     Osteoarthritis of spine with radiculopathy, lumbar region          Plan:       Joanne was seen today for back pain.    Diagnoses and all orders for this visit:    Spinal stenosis of lumbar region with neurogenic claudication  -     Procedure Order to [facility]; Future    DDD (degenerative disc disease), lumbar  -     Procedure Order to [facility]; Future    Chronic right-sided low back pain with bilateral sciatica    Spinal enthesopathy    Neurogenic claudication  -     Procedure Order to [facility]; Future    Osteoarthritis of spine with radiculopathy, lumbar region           1.  Continue Physical therapy: progressive resistance [facility] pattern [facility], she ahs been to 11 visits  2.  Etodolac 200mg  po TID  3. Cyclobenzaprine as needed  4.  MRI of the lumbar spine was reviewed, we discussed multilevel spinal stenosis.  We discussed talking to a surgeon. She would like to continue trying conservative treatment for now   5.  Gabapentin 100-200 mg TID  6.  Repeat L5 bilateral TF NATALIE with pain  Management (she would like to get another before leaving for [facility] [employer] April 27), the injection done on 3/22 gave her 85% relief for 2 weeks  7.  A note was given to her saying she needing a wheelchair at [facility] [employer] for walking distance  8.  RTC 6 weeks

## 2018-04-16 NOTE — LETTER
April 16, 2018    Joanne Lino  [address] [place] [address]         [facility]  [address], [address]  [facility] [address]  Phone: [phone]  Fax: [phone] April 16, 2018     Patient: Joanne Lino   YOB: 1955   Date of Visit: 4/16/2018       To Whom It May Concern:    It is my medical opinion that Joanne Lino needs a wheelchair for walking long distance, due to back pain and spinal stenosis and trouble with legs getting numb walking too far.    If you have any questions or concerns, please don't hesitate to call.    Sincerely,        Tammi Matute MD

## 2018-04-17 ENCOUNTER — CLINICAL SUPPORT (OUTPATIENT)
Dept: REHABILITATION | Facility: OTHER | Age: 63
End: 2018-04-17
Attending: PHYSICAL MEDICINE & REHABILITATION
Payer: MEDICARE

## 2018-04-17 DIAGNOSIS — M54.41 CHRONIC RIGHT-SIDED LOW BACK PAIN WITH BILATERAL SCIATICA: ICD-10-CM

## 2018-04-17 DIAGNOSIS — G89.29 CHRONIC RIGHT-SIDED LOW BACK PAIN WITH BILATERAL SCIATICA: ICD-10-CM

## 2018-04-17 DIAGNOSIS — M54.42 CHRONIC RIGHT-SIDED LOW BACK PAIN WITH BILATERAL SCIATICA: ICD-10-CM

## 2018-04-17 PROCEDURE — 97110 THERAPEUTIC EXERCISES: CPT

## 2018-04-17 NOTE — PROGRESS NOTES
"[facility]      Name: Joanne Lino  Clinic Number: [id number]  Date of Treatment: 2018   Diagnosis:   Encounter Diagnosis   Name Primary?    Chronic right-sided low back pain with bilateral sciatica       Physician: Tammi Matute, *    Pain pattern determined: 1PEP (changed after reassessment 4/10/18 per centralization of symptoms with EIL)     Plan of care signed: Not signed as of 2018   Time in: 1:05  Time Out: 2:15  Total Treatment time: 75  Precautions: HTN/OA, Grade 1-2 spondylolisthesis L4-5  Visit #: 12    POC due: Not signed as of 2018    Reassessment done: 4/10/18, 18  Reassessment due: 18    Face to Face discussion of patient was done between PT and PTA.     Subjective   Joanne reports she is having symptoms in the legs down the front of the le, it used to be just in the front of the leg but down it is going all the way down. She started walking with a cane over the weekend because she was feeling some weakness. She is scheduled to get bilateral injections on Friday. The last time she got the injections it only lasted for a few weeks, reports that she had the last set of injections about 4 weeks ago.    Patient reports their pain to be 4/10 on a 0-10 scale with 0 being no pain and 10 being the worst pain imaginable.    Pain Location: B LB/Lumbar    Occupation: No  Leisure: walk for exercise/play with [employer]/golfing/travel                     Pts goals:  "Be able to do more without pain , not fall"     Face to Face discussion of patient was done between PT and PTA.     Objective   Baseline IM Testing Results:   Date of testin18  ROM 18-42 deg   Max Peak Torque 84    Min Peak Torque 39    Flex/Ext Ratio 2.15   % below normative data 55     Femur 5      Midpoint IM Testing Results:   Date of testin2018  ROM 33-6   Max Peak Torque 105   Min Peak Torque 55   Flex/Ext Ratio 1.9   % below relative normative " data -51   % Change from Initial Evaluation +61          FOTO: Focus on Therapeutic Outcomes   Category: lumbar   % Impaired: 60%  Current Score  = CK = at least 40% but < 60% impaired, limited or restricted  Goal at Discharge Score = CJ = at least 20% but < 40% impaired, limited or restricted    VISIT 5: 62%  VISIT 10: not completed     MOVEMENT LOSS 3/27/18    ROM Loss   Flexion WFL   Extension moderate loss c/ pain   Side bending Right Min loss inc pull   Side bending Left minimal loss inc pull   Rotation Right minimal loss   Rotation Left minimal loss          Treatment    Pt was instructed in and performed the following:     Joanne received therapeutic exercises to develop/improved posture, cardiovascular endurance, muscular endurance, lumbar/cervical ROM, strength and muscular endurance for 40 minutes including the following exercises:      [facility] 4/17/2018   Visit Number 12   VAS Pain Rating 5   Recumbent Bike Seat Pos. 12   Time 10   Extension in Lying 10   Flexion in Lying 10   Manual Therapy 5   Lumbar Extension Seat Pad -   Femur Restraint -   Top Dead Center -   Counterweight -   Lumbar Flexion -   Lumbar Extension -   Lumbar Peak Torque -   Min Torque -   Percent From Norm -   Percent Change from Initial -   Lumbar Weight 48   Repetitions 20   Rating of Perceived Exertion 4   Ice - Z Lie (in min.) 10         EIL 10x increased left posterior thigh symptoms, no n/t today    Peripheral muscle strengthening which included 1 set of 15-20 repetitions at a slow, controlled 7 second per rep pace focused on strengthening supporting musculature for improved body mechanics and functional mobility.  Pt and therapist focused on proper form during treatment to ensure optimal strengthening of each targeted muscle group.  Machines were utilized including torso rotation, leg extension, leg curl, chest press, upright row. Tricep extension, bicep curl. Leg press, and hip abduction performed without  difficulty today.       Joanne received the following manual therapy techniques: Vacuum/cupping STM with manual therapy techniques was performed to B LE for 5 mins to decrease muscle tightness, increase circulation and promote healing process. The pt's skin was monitored for redness adjusting pressure as needed. The pt was instructed in possible side effects of bruising and/or soreness.     Also performed manual grade 2-3 PAs L3-L5 with improved symptoms. STM to lumbar paraspinal muscles with improved symptoms from 4/10 to 3/10. (not this visit)    Home Exercise Program as follows:   Flexion in lying with theraball 10x, 3x/day   PPT supine, seated, and standing 10x, 3x/day  Sidelying clamshells 15x  LTR 10x   Bridging 10x  EIL 10x     Handouts were given to the patient. Pt demo good understanding of the education provided. Joanne demonstrated good return demonstration of activities.     Lumbar roll use compliance: compliant with LR, ice pack, and theraball  Additional exercises taught this treatment session:   None     Assessment   Pt reports feeling somewhat better pot therapy, but still having some discomfort in the legs.  She was able to increase resistance on the lumbar med x by 5% and complete 20 repetitions, will increase 5% next visit. Pt has a vist about 4 days after her injections and then will be out of therapy for several weeks visiting family.    Patient is making fair progress towards established goals.  Pt will continue to benefit from skilled outpatient physical therapy to address the deficits stated in the impairment chart, provide pt/family education and to maximize pt's level of independence in the home and community environment.       Pt's spiritual, cultural and educational needs considered and pt agreeable to plan of care and goals as stated below:     Medical necessity is demonstrated by the following problem list.    Pt presents with the following impairments:   History  Co-morbidities and  personal factors that may impact the plan of care Examination  Body Structures and Functions, activity limitations and participation restrictions that may impact the plan of care Clinical Presentation    Decision Making/ Complexity Score   Co-morbidities:   OA/HTN     Personal Factors:   no deficits Body Regions:   back  lower extremities     Body Systems:   gross symmetry  ROM  strength  gross coordinated movement  gait  transfers  transitions  motor control     Activity limitations:   Learning and applying knowledge  no deficits     General Tasks and Commands  no deficits     Communication  no deficits     Mobility  lifting and carrying objects  walking     Self care  no deficits     Domestic Life  no deficits     Interactions/Relationships  no deficits     Life Areas  no deficits     Community and Social Life  community life  recreation and leisure     Participation Restrictions:   Walking/standing > 5-10 min/bending fwd/stairs       evolving clinical presentation with changing clinical characteristics    moderate            GOALS: Pt is in agreement with the following goals.     Short term goals:  6 weeks or 10 visits   1.  Pt will demonstrate increased lumbar ROM by at least 3 degrees from the initial ROM value with improvements noted in functional ROM and ability to perform ADLs  2.  Pt will demonstrate increased maximum isometric torque value by 5% when compared to the initial value resulting in improved ability to perform bending, lifting, and carrying activities safely, confidently.  3.  Patient report a reduction in worst pain score by 1-2 points for improved tolerance during work and recreational activities  4.  Pt able to perform HEP correctly with minimal cueing or supervision for therapist     Long term goals: 13 weeks or 20 visits   1. Pt will demonstrate increased lumbar ROM by at least 6 degrees from initial ROM value, resulting in improved ability to perform functional fwd bending while standing  and sitting.   2. Pt will demonstrate increased maximum isometric torque value by 10% when compared to the initial value resulting in improved ability to perform bending, lifting, and carrying activities safely, confidently.  3. Pt to demonstrate ability to independently control and reduce their pain through posture positioning and mechanical movements throughout a typical day.  4.  Patient will demonstrate improved overall function per FOTO Survey to CK = at least 40% but < 60% impaired, limited or restricted score or less.  5. Pt will be able to ambulate 10 minutes without experiencing LE numbness/weakness   6. Pt will be able to stairclimb with RG without pain      Plan   Continue with established Plan of Care towards established PT goals.

## 2018-04-19 ENCOUNTER — CLINICAL SUPPORT (OUTPATIENT)
Dept: REHABILITATION | Facility: OTHER | Age: 63
End: 2018-04-19
Attending: PHYSICAL MEDICINE & REHABILITATION
Payer: MEDICARE

## 2018-04-19 DIAGNOSIS — M54.42 CHRONIC RIGHT-SIDED LOW BACK PAIN WITH BILATERAL SCIATICA: ICD-10-CM

## 2018-04-19 DIAGNOSIS — M54.41 CHRONIC RIGHT-SIDED LOW BACK PAIN WITH BILATERAL SCIATICA: ICD-10-CM

## 2018-04-19 DIAGNOSIS — G89.29 CHRONIC RIGHT-SIDED LOW BACK PAIN WITH BILATERAL SCIATICA: ICD-10-CM

## 2018-04-19 PROCEDURE — 97110 THERAPEUTIC EXERCISES: CPT

## 2018-04-19 RX ORDER — ETODOLAC 200 MG/1
200 CAPSULE ORAL 3 TIMES DAILY
Qty: 90 CAPSULE | Refills: 1 | Status: SHIPPED | OUTPATIENT
Start: 2018-04-19 | End: 2018-06-20 | Stop reason: SDUPTHER

## 2018-04-19 NOTE — TELEPHONE ENCOUNTER
----- Message from Nohemi Shelton sent at 4/19/2018 12:11 PM CDT -----  Can the clinic reply in [facility]: No      Please refill the medication(s) listed below. Please call the patient when the prescription(s) is ready for  at this phone number   [phone]        Medication #1 etodolac (LODINE) 200 MG Cap    Medication #2 cyclobenzaprine (FLEXERIL) 10 MG tablet      Preferred Pharmacy: [facility] [address] [place] [address] [address] AT [facility] OF [address]

## 2018-04-19 NOTE — PROGRESS NOTES
"[facility]      Name: Joanne Lino  Clinic Number: [id number]  Date of Treatment: 2018   Diagnosis:   No diagnosis found.   Physician: Tammi Matute, *    Pain pattern determined: 1PEP (changed after reassessment 4/10/18 per centralization of symptoms with EIL)     Plan of care signed: Not signed as of 2018   Time in: 1:05  Time Out: 2:15  Total Treatment time: 75  Precautions: HTN/OA, Grade 1-2 spondylolisthesis L4-5  Visit #: 12    POC due: Not signed as of 2018    Reassessment done: 4/10/18, 18  Reassessment due: 18    Face to Face discussion of patient was done between PT and PTA.     Subjective   Joanne reports she is having symptoms in the legs down the front of the LE and LB. She is scheduled to get bilateral injections on Friday. The last time she got the injections it only lasted for a few weeks, reports that she had the last set of injections about 4 weeks ago.     Patient reports their pain to be 4/10 on a 0-10 scale with 0 being no pain and 10 being the worst pain imaginable.    Pain Location: B LB/Lumbar    Occupation: No  Leisure: walk for exercise/play with [employer]/golfing/travel                     Pts goals:  "Be able to do more without pain , not fall"     Face to Face discussion of patient was done between PT and PTA.     Objective   Baseline IM Testing Results:   Date of testin18  ROM 18-42 deg   Max Peak Torque 84    Min Peak Torque 39    Flex/Ext Ratio 2.15   % below normative data 55     Femur 5      Midpoint IM Testing Results:   Date of testin2018  ROM 33-6   Max Peak Torque 105   Min Peak Torque 55   Flex/Ext Ratio 1.9   % below relative normative data -51   % Change from Initial Evaluation +61          FOTO: Focus on Therapeutic Outcomes   Category: lumbar   % Impaired: 60%  Current Score  = CK = at least 40% but < 60% impaired, limited or restricted  Goal at Discharge Score = CJ = at " least 20% but < 40% impaired, limited or restricted    VISIT 5: 62%  VISIT 10: not completed     MOVEMENT LOSS 3/27/18    ROM Loss   Flexion WFL   Extension moderate loss c/ pain   Side bending Right Min loss inc pull   Side bending Left minimal loss inc pull   Rotation Right minimal loss   Rotation Left minimal loss          Treatment    Pt was instructed in and performed the following:     Joanne received therapeutic exercises to develop/improved posture, cardiovascular endurance, muscular endurance, lumbar/cervical ROM, strength and muscular endurance for 40 minutes including the following exercises:      [facility] 4/19/2018   Visit Number 13   VAS Pain Rating 5   Recumbent Bike Seat Pos. 12   Time 10   Extension in Lying 10   Flexion in Lying 10   Manual Therapy -   Lumbar Extension Seat Pad -   Femur Restraint -   Top Dead Center -   Counterweight -   Lumbar Flexion -   Lumbar Extension -   Lumbar Peak Torque -   Min Torque -   Percent From Norm -   Percent Change from Initial -   Lumbar Weight 52   Repetitions 15   Rating of Perceived Exertion 3   Ice - Z Lie (in min.) 10       EIL 10x increased left posterior thigh symptoms, no n/t today    Peripheral muscle strengthening which included 1 set of 15-20 repetitions at a slow, controlled 7 second per rep pace focused on strengthening supporting musculature for improved body mechanics and functional mobility.  Pt and therapist focused on proper form during treatment to ensure optimal strengthening of each targeted muscle group.  Machines were utilized including torso rotation, leg extension, leg curl, chest press, upright row. Tricep extension, bicep curl. Leg press, and hip abduction performed without difficulty today.       Joanne received the following manual therapy techniques: Vacuum/cupping STM with manual therapy techniques was performed to B LE for 5 mins to decrease muscle tightness, increase circulation and promote healing process. The pt's  skin was monitored for redness adjusting pressure as needed. The pt was instructed in possible side effects of bruising and/or soreness.       Home Exercise Program as follows:   Flexion in lying with theraball 10x, 3x/day   PPT supine, seated, and standing 10x, 3x/day  Sidelying clamshells 15x  LTR 10x   Bridging 10x  EIL 10x     Handouts were given to the patient. Pt demo good understanding of the education provided. Joanne demonstrated good return demonstration of activities.     Lumbar roll use compliance: compliant with LR, ice pack, and theraball  Additional exercises taught this treatment session:   None     Assessment   Pt reports feeling somewhat better post therapy, but still having some discomfort in the legs.  She was able to increase resistance on the lumbar med x with no c/o LBP.  She is getting injections in her LB nataliia and   leaving for [place] next week. She did get a wheelchair for [place]. She did state she may have to get surgery if injections don't help.     Patient is making fair progress towards established goals.  Pt will continue to benefit from skilled outpatient physical therapy to address the deficits stated in the impairment chart, provide pt/family education and to maximize pt's level of independence in the home and community environment.       Pt's spiritual, cultural and educational needs considered and pt agreeable to plan of care and goals as stated below:     Medical necessity is demonstrated by the following problem list.    Pt presents with the following impairments:   History  Co-morbidities and personal factors that may impact the plan of care Examination  Body Structures and Functions, activity limitations and participation restrictions that may impact the plan of care Clinical Presentation    Decision Making/ Complexity Score   Co-morbidities:   OA/HTN     Personal Factors:   no deficits Body Regions:   back  lower extremities     Body Systems:   gross  symmetry  ROM  strength  gross coordinated movement  gait  transfers  transitions  motor control     Activity limitations:   Learning and applying knowledge  no deficits     General Tasks and Commands  no deficits     Communication  no deficits     Mobility  lifting and carrying objects  walking     Self care  no deficits     Domestic Life  no deficits     Interactions/Relationships  no deficits     Life Areas  no deficits     Community and Social Life  community life  recreation and leisure     Participation Restrictions:   Walking/standing > 5-10 min/bending fwd/stairs       evolving clinical presentation with changing clinical characteristics    moderate            GOALS: Pt is in agreement with the following goals.     Short term goals:  6 weeks or 10 visits   1.  Pt will demonstrate increased lumbar ROM by at least 3 degrees from the initial ROM value with improvements noted in functional ROM and ability to perform ADLs  2.  Pt will demonstrate increased maximum isometric torque value by 5% when compared to the initial value resulting in improved ability to perform bending, lifting, and carrying activities safely, confidently.  3.  Patient report a reduction in worst pain score by 1-2 points for improved tolerance during work and recreational activities  4.  Pt able to perform HEP correctly with minimal cueing or supervision for therapist     Long term goals: 13 weeks or 20 visits   1. Pt will demonstrate increased lumbar ROM by at least 6 degrees from initial ROM value, resulting in improved ability to perform functional fwd bending while standing and sitting.   2. Pt will demonstrate increased maximum isometric torque value by 10% when compared to the initial value resulting in improved ability to perform bending, lifting, and carrying activities safely, confidently.  3. Pt to demonstrate ability to independently control and reduce their pain through posture positioning and mechanical movements throughout a  typical day.  4.  Patient will demonstrate improved overall function per FOTO Survey to CK = at least 40% but < 60% impaired, limited or restricted score or less.  5. Pt will be able to ambulate 10 minutes without experiencing LE numbness/weakness   6. Pt will be able to stairclimb with RG without pain      Plan   Continue with established Plan of Care towards established PT goals.

## 2018-04-20 ENCOUNTER — HOSPITAL ENCOUNTER (OUTPATIENT)
Facility: OTHER | Age: 63
Discharge: HOME | End: 2018-04-20
Attending: ANESTHESIOLOGY | Admitting: ANESTHESIOLOGY
Payer: MEDICARE

## 2018-04-20 ENCOUNTER — SURGERY (OUTPATIENT)
Age: 63
End: 2018-04-20

## 2018-04-20 VITALS
RESPIRATION RATE: 18 BRPM | WEIGHT: 195 LBS | HEART RATE: 96 BPM | TEMPERATURE: 99 F | OXYGEN SATURATION: 97 % | HEIGHT: 62 IN | DIASTOLIC BLOOD PRESSURE: 60 MMHG | SYSTOLIC BLOOD PRESSURE: 124 MMHG | BODY MASS INDEX: 35.88 KG/M2

## 2018-04-20 DIAGNOSIS — M54.16 LUMBAR RADICULITIS: Primary | ICD-10-CM

## 2018-04-20 PROCEDURE — 63600175 PHARM REV CODE 636 W HCPCS: Performed by: ANESTHESIOLOGY

## 2018-04-20 PROCEDURE — 25000003 PHARM REV CODE 250: Performed by: ANESTHESIOLOGY

## 2018-04-20 PROCEDURE — 64483 NJX AA&/STRD TFRM EPI L/S 1: CPT | Mod: 50,,, | Performed by: ANESTHESIOLOGY

## 2018-04-20 PROCEDURE — 64483 NJX AA&/STRD TFRM EPI L/S 1: CPT | Mod: 50 | Performed by: ANESTHESIOLOGY

## 2018-04-20 PROCEDURE — 25500020 PHARM REV CODE 255: Performed by: ANESTHESIOLOGY

## 2018-04-20 RX ORDER — ALPRAZOLAM 0.5 MG/1
1 TABLET, ORALLY DISINTEGRATING ORAL ONCE
Status: COMPLETED | OUTPATIENT
Start: 2018-04-20 | End: 2018-04-20

## 2018-04-20 RX ORDER — DULOXETIN HYDROCHLORIDE 30 MG/1
60 CAPSULE, DELAYED RELEASE ORAL DAILY
COMMUNITY
End: 2019-03-22 | Stop reason: SDUPTHER

## 2018-04-20 RX ORDER — METHYLPREDNISOLONE ACETATE 80 MG/ML
INJECTION, SUSPENSION INTRA-ARTICULAR; INTRALESIONAL; INTRAMUSCULAR; SOFT TISSUE
Status: DISCONTINUED | OUTPATIENT
Start: 2018-04-20 | End: 2018-04-20 | Stop reason: HOSPADM

## 2018-04-20 RX ORDER — PREGABALIN 100 MG/1
100 CAPSULE ORAL 2 TIMES DAILY
COMMUNITY
End: 2018-05-29

## 2018-04-20 RX ORDER — SODIUM CHLORIDE 9 MG/ML
INJECTION, SOLUTION INTRAVENOUS CONTINUOUS
Status: DISCONTINUED | OUTPATIENT
Start: 2018-04-20 | End: 2018-04-20

## 2018-04-20 RX ORDER — LIDOCAINE HYDROCHLORIDE 10 MG/ML
INJECTION INFILTRATION; PERINEURAL
Status: DISCONTINUED | OUTPATIENT
Start: 2018-04-20 | End: 2018-04-20 | Stop reason: HOSPADM

## 2018-04-20 RX ADMIN — IOHEXOL 5 ML: 300 INJECTION, SOLUTION INTRAVENOUS at 03:04

## 2018-04-20 RX ADMIN — ALPRAZOLAM 1 MG: 0.5 TABLET, ORALLY DISINTEGRATING ORAL at 02:04

## 2018-04-20 RX ADMIN — METHYLPREDNISOLONE ACETATE 80 MG: 80 INJECTION, SUSPENSION INTRA-ARTICULAR; INTRALESIONAL; INTRAMUSCULAR; SOFT TISSUE at 03:04

## 2018-04-20 RX ADMIN — SODIUM BICARBONATE 4 MEQ: 0.2 INJECTION, SOLUTION INTRAVENOUS at 03:04

## 2018-04-20 RX ADMIN — LIDOCAINE HYDROCHLORIDE 10 ML: 10 INJECTION, SOLUTION INFILTRATION; PERINEURAL at 03:04

## 2018-04-20 NOTE — OP NOTE
Date: 04/20/2018    Procedure: Bilateral L5 Transforaminal Epidural Steroid Injection    Referring Provider: Dr. Matute    Pre-op diagnosis: Lumbar stenosis with neurogenic claudication [M48.062]    Post-op diagnosis: Lumbar stenosis with neurogenic claudication [M48.062]     Physician: Dr. Edwina Williamson     Assistant: Dr. Gupta    Anesthestia: local/niravam    All medications, allergies, and relevant histories were reviewed. No recent antibiotics or infections.  A time-out was taken to verify the correct patient, procedure, laterality, and appropriate medications/allergies.    Fluoroscopically-Guided, Contrast-Controlled Transforaminal Epidural Steroid Injection:     Following denial of allergy and review of potential side effects and complications including but not necessarily limited to infection, allergic reaction, local tissue breakdown, nerve injury, and paresis, the patient indicated they understood and agreed to proceed.     The patient was positioned prone and prepped and draped in the usual sterile fashion. The right L5 pedicle was identified fluoroscopically via an oblique view. The skin was anesthetized via 25-gauge 1 needle with approximately 3 cc of bicarbonated 1% lidocaine. At this point, a 22-gauge 3.5 spinal needle was atraumatically introduced and advanced under fluoroscopic guidance to the anterosuperior aspect of the L5 neural foramen. Depth was gauged on lateral view. Needle position at approximately the 6 oclock position relative to the pedicle was confirmed in the AP view. Following negative aspiration for blood and CSF and confirming the absence of paresthesias, injection of approximately 1cc of Omnipaque 300 demonstrated excellent spread along the nerve root into the epidural space.  At this point, 1.5 cc of a mixture of 2 cc of 0.25% bupivacaine with 80 mg of Depo-Medrol was injected without complication. The needle was flushed with 1% lidocaine withdrawn.     The procedure was then  repeated in an identical manner at the level of L5 on the right.    The patient was followed post procedure and discharged under their own power in excellent condition in the company of a responsible adult.       Future Management:   If helpful, can repeat as needed.    Follow up with Dr. Matute    I certify that I provided the above services.  I was present for the entire procedure, which was performed by myself with the assistance of the resident physician.  There were no parts of the procedure that were performed not by myself or without my direct supervision.

## 2018-04-20 NOTE — DISCHARGE INSTRUCTIONS
Home Care Instructions Pain Management:    1. DIET:   You may resume your normal diet today.   2. BATHING:   You may shower with luke warm water.  3. DRESSING:   You may remove your bandage today.   4. ACTIVITY LEVEL:   You may resume your normal activities 24 hrs after your procedure.  5. MEDICATIONS:   You may resume your normal medications today.   6. SPECIAL INSTRUCTIONS:   No heat to the injection site for 24 hrs including, bath or shower, heating pad, moist heat, or hot tubs.    Use ice pack to injection site for any pain or discomfort.  Apply ice packs for 20 minute intervals as needed.   If you have received any sedatives by mouth today you may not drive for 12 hours.    If you have received any sedation through your IV, you may not drive for 24 hrs.     PLEASE CALL YOUR DOCTOR IF:  1. Redness or swelling around the injection site.  2. Fever of 101 degrees  3. Drainage (pus) from the injection site.  4. For any continuous bleeding (some dried blood over the incision is normal.)    FOR EMERGENCIES:   If any unusual problems or difficulties occur during clinic hours, call [phone] or 262.     Thank you for allowing us to care for you today. You may receive a survey about the care we provided. Your feedback is valuable and helps us provide excellent care throughout the community.

## 2018-04-23 RX ORDER — CYCLOBENZAPRINE HCL 10 MG
5-10 TABLET ORAL 3 TIMES DAILY PRN
Qty: 90 TABLET | Refills: 1 | Status: SHIPPED | OUTPATIENT
Start: 2018-04-23 | End: 2018-05-29 | Stop reason: SDUPTHER

## 2018-04-23 NOTE — TELEPHONE ENCOUNTER
----- Message from Ryder Franklin sent at 4/23/2018  8:45 AM CDT -----  Contact: patient      Can the clinic reply in [facility]: no      Please refill the medication(s) listed below. Please call the patient when the prescription(s) is ready for  at this phone number   [phone]      Medication #1 cyclobenzaprine (FLEXERIL) 10 MG tablet     Medication #2 none      Preferred Pharmacy: [facility] [address] [place] [address] [address] AT [facility] [place] of [address] [phone]

## 2018-04-24 ENCOUNTER — CLINICAL SUPPORT (OUTPATIENT)
Dept: REHABILITATION | Facility: OTHER | Age: 63
End: 2018-04-24
Attending: PHYSICAL MEDICINE & REHABILITATION
Payer: MEDICARE

## 2018-04-24 ENCOUNTER — DOCUMENTATION ONLY (OUTPATIENT)
Dept: REHABILITATION | Facility: OTHER | Age: 63
End: 2018-04-24

## 2018-04-24 DIAGNOSIS — G89.29 CHRONIC RIGHT-SIDED LOW BACK PAIN WITH BILATERAL SCIATICA: ICD-10-CM

## 2018-04-24 DIAGNOSIS — M54.42 CHRONIC RIGHT-SIDED LOW BACK PAIN WITH BILATERAL SCIATICA: ICD-10-CM

## 2018-04-24 DIAGNOSIS — M54.41 CHRONIC RIGHT-SIDED LOW BACK PAIN WITH BILATERAL SCIATICA: ICD-10-CM

## 2018-04-24 PROCEDURE — 97110 THERAPEUTIC EXERCISES: CPT

## 2018-04-24 NOTE — PROGRESS NOTES
"[facility]      Name: Joanne Lino  Clinic Number: [id number]  Date of Treatment: 2018   Diagnosis:   Encounter Diagnosis   Name Primary?    Chronic right-sided low back pain with bilateral sciatica       Physician: Tammi Matute, *    Pain pattern determined: 1PEP (changed after reassessment 4/10/18 per centralization of symptoms with EIL)     Plan of care signed: Not signed as of 2018   Time in: 1:00  Time Out: 2:00  Total Treatment time: 75  Precautions: HTN/OA, Grade 1-2 spondylolisthesis L4-5  Visit #: 14    POC due: Not signed as of 2018    Reassessment done: 4/10/18, 18  Reassessment due: 18 done 18  Next reassessment 18  Face to Face discussion of patient was done between PT and PTA.     Subjective   Joanne reports she had the shot on Friday and today she feels more stable on her legs and she had some discomfort in her back earlier today but right now she is feeling a little better    Patient reports their pain to be 4/10 on a 0-10 scale with 0 being no pain and 10 being the worst pain imaginable.    Pain Location: B LB/Lumbar    Occupation: No  Leisure: walk for exercise/play with [employer]/golfing/travel                     Pts goals:  "Be able to do more without pain , not fall"     Face to Face discussion of patient was done between PT and PTA.     Objective   Baseline IM Testing Results:   Date of testin18  ROM 18-42 deg   Max Peak Torque 84    Min Peak Torque 39    Flex/Ext Ratio 2.15   % below normative data 55     Femur 5      Midpoint IM Testing Results:   Date of testin2018  ROM 33-6   Max Peak Torque 105   Min Peak Torque 55   Flex/Ext Ratio 1.9   % below relative normative data -51   % Change from Initial Evaluation +61          FOTO: Focus on Therapeutic Outcomes   Category: lumbar   % Impaired: 60%  Current Score  = CK = at least 40% but < 60% impaired, limited or restricted  Goal at " Discharge Score = CJ = at least 20% but < 40% impaired, limited or restricted    VISIT 5: 62%  VISIT 10: not completed     MOVEMENT LOSS 3/27/18    ROM Loss   Flexion WFL   Extension moderate loss c/ pain   Side bending Right Min loss inc pull   Side bending Left minimal loss inc pull   Rotation Right minimal loss   Rotation Left minimal loss          Treatment    Pt was instructed in and performed the following:     Joanne received therapeutic exercises to develop/improved posture, cardiovascular endurance, muscular endurance, lumbar/cervical ROM, strength and muscular endurance for 40 minutes including the following exercises:      [facility] 4/24/2018   Visit Number 14   VAS Pain Rating 4   Recumbent Bike Seat Pos. 12   Time 10   Extension in Lying -   Extension in Standing 5   Flexion in Lying 10   Manual Therapy -   Lumbar Extension Seat Pad -   Femur Restraint -   Top Dead Center -   Counterweight -   Lumbar Flexion -   Lumbar Extension -   Lumbar Peak Torque -   Min Torque -   Percent From Norm -   Percent Change from Initial -   Lumbar Weight 52   Repetitions 20   Rating of Perceived Exertion 2.5   Ice - Z Lie (in min.) 10       EIL 10x n/t today  LTR 10x  Bridge (Pr reports feeling some symptoms in the lower leg when pressing up, stopped around 5x)  Clamshell      Peripheral muscle strengthening which included 1 set of 15-20 repetitions at a slow, controlled 7 second per rep pace focused on strengthening supporting musculature for improved body mechanics and functional mobility.  Pt and therapist focused on proper form during treatment to ensure optimal strengthening of each targeted muscle group.  Machines were utilized including torso rotation, leg extension, leg curl, chest press, upright row. Tricep extension, bicep curl. Leg press, and hip abduction performed without difficulty today.       Joanne received the following manual therapy techniques: Vacuum/cupping STM with manual therapy  techniques was performed to B LE for 5 mins to decrease muscle tightness, increase circulation and promote healing process. The pt's skin was monitored for redness adjusting pressure as needed. The pt was instructed in possible side effects of bruising and/or soreness.       Home Exercise Program as follows:   Flexion in lying with theraball 10x, 3x/day   PPT supine, seated, and standing 10x, 3x/day  Sidelying clamshells 15x  LTR 10x   Bridging 10x  EIL 10x     Handouts were given to the patient. Pt demo good understanding of the education provided. Joanne demonstrated good return demonstration of activities.     Lumbar roll use compliance: compliant with LR, ice pack, and theraball  Additional exercises taught this treatment session:   None     Assessment   Pt reports feeling somewhat better post therapy. She was able to increase reps on the lumbar med x with no c/o LBP.  Leaving for [employer] [place] later this week. She did get a wheelchair for [employer]. PT was taught EIS and to d/c if it causes any increased discomfort.     Patient is making fair progress towards established goals.  Pt will continue to benefit from skilled outpatient physical therapy to address the deficits stated in the impairment chart, provide pt/family education and to maximize pt's level of independence in the home and community environment.       Pt's spiritual, cultural and educational needs considered and pt agreeable to plan of care and goals as stated below:     Medical necessity is demonstrated by the following problem list.    Pt presents with the following impairments:   History  Co-morbidities and personal factors that may impact the plan of care Examination  Body Structures and Functions, activity limitations and participation restrictions that may impact the plan of care Clinical Presentation    Decision Making/ Complexity Score   Co-morbidities:   OA/HTN     Personal Factors:   no deficits Body Regions:   back  lower  extremities     Body Systems:   gross symmetry  ROM  strength  gross coordinated movement  gait  transfers  transitions  motor control     Activity limitations:   Learning and applying knowledge  no deficits     General Tasks and Commands  no deficits     Communication  no deficits     Mobility  lifting and carrying objects  walking     Self care  no deficits     Domestic Life  no deficits     Interactions/Relationships  no deficits     Life Areas  no deficits     Community and Social Life  community life  recreation and leisure     Participation Restrictions:   Walking/standing > 5-10 min/bending fwd/stairs       evolving clinical presentation with changing clinical characteristics    moderate            GOALS: Pt is in agreement with the following goals.     Short term goals:  6 weeks or 10 visits   1.  Pt will demonstrate increased lumbar ROM by at least 3 degrees from the initial ROM value with improvements noted in functional ROM and ability to perform ADLs  2.  Pt will demonstrate increased maximum isometric torque value by 5% when compared to the initial value resulting in improved ability to perform bending, lifting, and carrying activities safely, confidently.  3.  Patient report a reduction in worst pain score by 1-2 points for improved tolerance during work and recreational activities  4.  Pt able to perform HEP correctly with minimal cueing or supervision for therapist     Long term goals: 13 weeks or 20 visits   1. Pt will demonstrate increased lumbar ROM by at least 6 degrees from initial ROM value, resulting in improved ability to perform functional fwd bending while standing and sitting.   2. Pt will demonstrate increased maximum isometric torque value by 10% when compared to the initial value resulting in improved ability to perform bending, lifting, and carrying activities safely, confidently.  3. Pt to demonstrate ability to independently control and reduce their pain through posture positioning  and mechanical movements throughout a typical day.  4.  Patient will demonstrate improved overall function per FOTO Survey to CK = at least 40% but < 60% impaired, limited or restricted score or less.  5. Pt will be able to ambulate 10 minutes without experiencing LE numbness/weakness   6. Pt will be able to stairclimb with RG without pain      Plan   Continue with established Plan of Care towards established PT goals.

## 2018-04-24 NOTE — PROGRESS NOTES
Pt called several hours after leaving visit and reported that she was at the mall walking around [facility] and she felt her legs go numb and it felt like they were going to go out and she was going to fall. She was sitting in the car and wanted to know what exercises that she could do to help with the numbness in her legs. PT was educated to try and do some pelvic tilts wile sitting and when she gets home to do her extension in lying and the other stretches.

## 2018-05-14 ENCOUNTER — TELEPHONE (OUTPATIENT)
Dept: SPINE | Facility: CLINIC | Age: 63
End: 2018-05-14

## 2018-05-14 NOTE — TELEPHONE ENCOUNTER
Patient called stated that her pain is  8/10 the  medicine helps a little bite but the pain is a dull and zepeda in back down to bilateral knees increasing pain while walking patient stated that injection helped but will like to come in to see  appointment schedule for 5/17 at 930am.  ----- Message from Nell Adams sent at 5/14/2018 11:19 AM CDT -----  Contact: Joanne   Name of Who is Calling: Joanne       What is the request in detail: Patient is requesting a call back concerning her spine issues she states it has not gotten better she is requesting a call back to discuss this matter       Can the clinic reply by [facility]: No       What Number to Call Back if not in [facility]: [phone]

## 2018-05-14 NOTE — TELEPHONE ENCOUNTER
Called patient no answer unable to leave message mailbox is full.  ----- Message from Nell Adams sent at 5/14/2018 11:04 AM CDT -----  Contact: Joanne   Name of Who is Calling: Joanne       What is the request in detail: Patient is requesting a call back concerning her spine issues she states it has not gotten better she is requesting a call back to discuss this matter       Can the clinic reply by [facility]: No       What Number to Call Back if not in [facility]: [phone]

## 2018-05-15 ENCOUNTER — CLINICAL SUPPORT (OUTPATIENT)
Dept: REHABILITATION | Facility: OTHER | Age: 63
End: 2018-05-15
Attending: PHYSICAL MEDICINE & REHABILITATION
Payer: MEDICARE

## 2018-05-15 DIAGNOSIS — M54.41 CHRONIC RIGHT-SIDED LOW BACK PAIN WITH BILATERAL SCIATICA: ICD-10-CM

## 2018-05-15 DIAGNOSIS — G89.29 CHRONIC RIGHT-SIDED LOW BACK PAIN WITH BILATERAL SCIATICA: ICD-10-CM

## 2018-05-15 DIAGNOSIS — M54.42 CHRONIC RIGHT-SIDED LOW BACK PAIN WITH BILATERAL SCIATICA: ICD-10-CM

## 2018-05-15 PROCEDURE — 97110 THERAPEUTIC EXERCISES: CPT

## 2018-05-15 NOTE — PROGRESS NOTES
"[facility]      Name: Joanne Lino  Clinic Number: [id number]  Date of Treatment: 05/15/2018   Diagnosis:   Encounter Diagnosis   Name Primary?    Chronic right-sided low back pain with bilateral sciatica       Physician: Tammi Matute, *    Pain pattern determined: 1PEP (changed after reassessment 4/10/18 per centralization of symptoms with EIL)     Plan of care signed: Not signed as of 05/15/2018   Time in: 1:00  Time Out: 2:00  Total Treatment time: 75  Precautions: HTN/OA, Grade 1-2 spondylolisthesis L4-5  Visit #: 15    POC due: Not signed as of 05/15/2018    Reassessment done: 4/10/18, 18  Reassessment due: 18 done 18  Next reassessment 18      Face to Face discussion of patient was done between PT and PTA.     Subjective   Joanne reports she is still having a lot of LB pain. She has an appointment with pain management this week.    Patient reports their pain to be 4/10 on a 0-10 scale with 0 being no pain and 10 being the worst pain imaginable.    Pain Location: B LB/Lumbar    Occupation: No  Leisure: walk for exercise/play with [employer]/golfing/travel                     Pts goals:  "Be able to do more without pain , not fall"     Face to Face discussion of patient was done between PT and PTA.     Objective   Baseline IM Testing Results:   Date of testin18  ROM 18-42 deg   Max Peak Torque 84    Min Peak Torque 39    Flex/Ext Ratio 2.15   % below normative data 55     Femur 5      Midpoint IM Testing Results:   Date of testin2018  ROM 33-6   Max Peak Torque 105   Min Peak Torque 55   Flex/Ext Ratio 1.9   % below relative normative data -51   % Change from Initial Evaluation +61          FOTO: Focus on Therapeutic Outcomes   Category: lumbar   % Impaired: 60%  Current Score  = CK = at least 40% but < 60% impaired, limited or restricted  Goal at Discharge Score = CJ = at least 20% but < 40% impaired, limited or " restricted    VISIT 5: 62%  VISIT 10: not completed     MOVEMENT LOSS 3/27/18    ROM Loss   Flexion WFL   Extension moderate loss c/ pain   Side bending Right Min loss inc pull   Side bending Left minimal loss inc pull   Rotation Right minimal loss   Rotation Left minimal loss          Treatment    Pt was instructed in and performed the following:     Joanne received therapeutic exercises to develop/improved posture, cardiovascular endurance, muscular endurance, lumbar/cervical ROM, strength and muscular endurance for 40 minutes including the following exercises:        [facility] 5/15/2018   Visit Number 15   VAS Pain Rating 3   Recumbent Bike Seat Pos. 12   Time 10   Extension in Lying -   Extension in Standing -   Flexion in Lying 10   Manual Therapy 10   Lumbar Extension Seat Pad -   Femur Restraint -   Top Dead Center -   Counterweight -   Lumbar Flexion -   Lumbar Extension -   Lumbar Peak Torque -   Min Torque -   Percent From Norm -   Percent Change from Initial -   Lumbar Weight 55   Repetitions 15   Rating of Perceived Exertion 4   Ice - Z Lie (in min.) 10     Manual 5 mins  EIL 10x n/t today  LTR 10x  Bridge (Pr reports feeling some symptoms in the lower leg when pressing up, stopped around 5x)  Clamshell      Peripheral muscle strengthening which included 1 set of 15-20 repetitions at a slow, controlled 7 second per rep pace focused on strengthening supporting musculature for improved body mechanics and functional mobility.  Pt and therapist focused on proper form during treatment to ensure optimal strengthening of each targeted muscle group.  Machines were utilized including torso rotation, leg extension, leg curl, chest press, upright row. Tricep extension, bicep curl. Leg press, and hip abduction performed without difficulty today.       Joanne received the following manual therapy techniques: Vacuum/cupping STM with manual therapy techniques was performed to B LE for 5 mins to decrease  muscle tightness, increase circulation and promote healing process. The pt's skin was monitored for redness adjusting pressure as needed. The pt was instructed in possible side effects of bruising and/or soreness.       Home Exercise Program as follows:   Flexion in lying with theraball 10x, 3x/day   PPT supine, seated, and standing 10x, 3x/day  Sidelying clamshells 15x  LTR 10x   Bridging 10x  EIL 10x     Handouts were given to the patient. Pt demo good understanding of the education provided. Joanne demonstrated good return demonstration of activities.     Lumbar roll use compliance: compliant with LR, ice pack, and theraball  Additional exercises taught this treatment session:   None     Assessment   Pt reports feeling somewhat better post therapy post cupping and session.  She was able to increase weight on the lumbar med x with no c/o LBP.  Pt has been having minimal improvement with therapy and may get more treatment with doctor. Cupping did help her today and she may want to order the cups. Issued her cupping order info.     Patient is making fair progress towards established goals.  Pt will continue to benefit from skilled outpatient physical therapy to address the deficits stated in the impairment chart, provide pt/family education and to maximize pt's level of independence in the home and community environment.       Pt's spiritual, cultural and educational needs considered and pt agreeable to plan of care and goals as stated below:     Medical necessity is demonstrated by the following problem list.    Pt presents with the following impairments:   History  Co-morbidities and personal factors that may impact the plan of care Examination  Body Structures and Functions, activity limitations and participation restrictions that may impact the plan of care Clinical Presentation    Decision Making/ Complexity Score   Co-morbidities:   OA/HTN     Personal Factors:   no deficits Body Regions:   back  lower  extremities     Body Systems:   gross symmetry  ROM  strength  gross coordinated movement  gait  transfers  transitions  motor control     Activity limitations:   Learning and applying knowledge  no deficits     General Tasks and Commands  no deficits     Communication  no deficits     Mobility  lifting and carrying objects  walking     Self care  no deficits     Domestic Life  no deficits     Interactions/Relationships  no deficits     Life Areas  no deficits     Community and Social Life  community life  recreation and leisure     Participation Restrictions:   Walking/standing > 5-10 min/bending fwd/stairs       evolving clinical presentation with changing clinical characteristics    moderate            GOALS: Pt is in agreement with the following goals.     Short term goals:  6 weeks or 10 visits   1.  Pt will demonstrate increased lumbar ROM by at least 3 degrees from the initial ROM value with improvements noted in functional ROM and ability to perform ADLs  2.  Pt will demonstrate increased maximum isometric torque value by 5% when compared to the initial value resulting in improved ability to perform bending, lifting, and carrying activities safely, confidently.  3.  Patient report a reduction in worst pain score by 1-2 points for improved tolerance during work and recreational activities  4.  Pt able to perform HEP correctly with minimal cueing or supervision for therapist     Long term goals: 13 weeks or 20 visits   1. Pt will demonstrate increased lumbar ROM by at least 6 degrees from initial ROM value, resulting in improved ability to perform functional fwd bending while standing and sitting.   2. Pt will demonstrate increased maximum isometric torque value by 10% when compared to the initial value resulting in improved ability to perform bending, lifting, and carrying activities safely, confidently.  3. Pt to demonstrate ability to independently control and reduce their pain through posture positioning  and mechanical movements throughout a typical day.  4.  Patient will demonstrate improved overall function per FOTO Survey to CK = at least 40% but < 60% impaired, limited or restricted score or less.  5. Pt will be able to ambulate 10 minutes without experiencing LE numbness/weakness   6. Pt will be able to stairclimb with RG without pain      Plan   Continue with established Plan of Care towards established PT goals.

## 2018-05-29 ENCOUNTER — OFFICE VISIT (OUTPATIENT)
Dept: SPINE | Facility: CLINIC | Age: 63
End: 2018-05-29
Attending: PHYSICAL MEDICINE & REHABILITATION
Payer: MEDICARE

## 2018-05-29 VITALS
DIASTOLIC BLOOD PRESSURE: 84 MMHG | SYSTOLIC BLOOD PRESSURE: 183 MMHG | HEART RATE: 92 BPM | BODY MASS INDEX: 40.85 KG/M2 | WEIGHT: 222 LBS | HEIGHT: 62 IN

## 2018-05-29 DIAGNOSIS — M51.36 DDD (DEGENERATIVE DISC DISEASE), LUMBAR: ICD-10-CM

## 2018-05-29 DIAGNOSIS — M54.41 CHRONIC RIGHT-SIDED LOW BACK PAIN WITH BILATERAL SCIATICA: ICD-10-CM

## 2018-05-29 DIAGNOSIS — G89.29 CHRONIC RIGHT-SIDED LOW BACK PAIN WITH BILATERAL SCIATICA: ICD-10-CM

## 2018-05-29 DIAGNOSIS — R29.818 NEUROGENIC CLAUDICATION: ICD-10-CM

## 2018-05-29 DIAGNOSIS — M48.062 SPINAL STENOSIS OF LUMBAR REGION WITH NEUROGENIC CLAUDICATION: Primary | ICD-10-CM

## 2018-05-29 DIAGNOSIS — M47.26 OSTEOARTHRITIS OF SPINE WITH RADICULOPATHY, LUMBAR REGION: ICD-10-CM

## 2018-05-29 DIAGNOSIS — M54.42 CHRONIC RIGHT-SIDED LOW BACK PAIN WITH BILATERAL SCIATICA: ICD-10-CM

## 2018-05-29 PROCEDURE — 99999 PR PBB SHADOW E&M-EST. PATIENT-LVL III: CPT | Mod: PBBFAC,,, | Performed by: PHYSICAL MEDICINE & REHABILITATION

## 2018-05-29 PROCEDURE — 99213 OFFICE O/P EST LOW 20 MIN: CPT | Mod: PBBFAC | Performed by: PHYSICAL MEDICINE & REHABILITATION

## 2018-05-29 PROCEDURE — 99214 OFFICE O/P EST MOD 30 MIN: CPT | Mod: S$PBB,,, | Performed by: PHYSICAL MEDICINE & REHABILITATION

## 2018-05-29 RX ORDER — CYCLOBENZAPRINE HCL 10 MG
5-10 TABLET ORAL 3 TIMES DAILY PRN
Qty: 90 TABLET | Refills: 1 | Status: SHIPPED | OUTPATIENT
Start: 2018-05-29 | End: 2019-01-21

## 2018-05-29 NOTE — PROGRESS NOTES
Subjective:      Patient ID: Joanne Lino is a 63 y.o. female.    Chief Complaint: Back Pain    Referred by: No ref. provider found     Ms Lino is a 62 yo female here for follow up of her low back and right leg pain that started on 2/1/2018 when she got up after sitting and fell to the ground with leg numbness.  She went to the ER on 2/2/2018 and was given a toradol shot nsaid and muscle relaxer.   She has had episodes of back and leg tingling off and on for the past 10 years, but she has never fallen.  She was seen by me on 2/5/2018 and then on 2/15/2018 we did a right iliolumbar injection with 100% relief for 2-3 days and she has been to 2 PT sessions.  She was then seen by me on 3/8/2018 and she was having more numbness and could not stand and walk too far.  we ordered an MRI and sent her for bilateral L5 TF NATALIE done on 3/22/2018.  She felt like the injection helped about 85% for about 2 weeks.  She was last seen by me on 4/16/2018 and she was continuing in PT and we ordered a repeat injection.  Bilateral L5 TF NATALIE done 4/20/2018 was not as helpful.  She still gets the numbness and tingling in the legs and trembling and she will sit down and get relief.  She has been using the rolling walker recently.  She feels like she has gained weight because she is not as active. She does have back pain and leg pain.  Both are worse with standing and walking and stairs.  She is still going.  The muscles in her calf are tight    X-ray lumbar 2/2/2018  Findings:   No fracture identified.  Grade 1-2 spondylolisthesis L4-5 with disc space narrowing.  Facet arthropathy L4-5 and L5-S1.  Degenerative disc changes vacuum phenomenon multilevel sparing only the L3-4 disc space.      Impression      1.  Multilevel degenerative disc and facet changes.     MRI lumbar 3/2018  There are several hemangiomas the bodies of lumbar vertebra.  Paraspinal soft tissues are unremarkable.  The cord ends at L1.    At T12-L1 normal disc, central  canal, foramina and facets.    At L1-2 moderate loss of disc height with disc desiccation.  Concentric herniation of disc, anteriorly a disc spur complex.  Posteriorly a broad-based contained extrusion 3.5 mm thickness, no significant central canal or foraminal stenosis.  Facets are unremarkable.    At L2-3 moderate loss of disc height with disc desiccation.  A large herniation posteriorly, a contained protrusion 6.5 mm thickness, is demonstrated and there is ligamentous hypertrophy.  The central canal AP diameter is 8 mm.  There is bilateral foraminal perineural fat.  Facet degeneration right side with hypertrophy.    At L3-4 normal disc height and signal.  Broad-base contained extrusion of disc, 4 mm thickness, and ligamentous hypertrophy posteriorly.  AP central canal diameter 9.5 mm.  No significant foraminal stenosis with perineural fat noted bilaterally.  Facet degeneration hypertrophy right side.    At L4-5 normal disc height with partial desiccation.  Grade 1 spondylolisthesis with severe facet and ligamentous degeneration and hypertrophy.  AP central canal diameter 8.2 mm.  50% loss of perineural fat right foramen, adequate perineural fat left foramen.    At L5-S1 loss of normal disc height with disc desiccation.  The herniation is broad-based, a contained protrusion 3.3 mm thick, and with ligamentous and facet hypertrophy.  AP central canal diameter 11 mm.  Loss of perineural fat in the foramina, bilaterally.  Facet degeneration.    Normal sacroiliac joints.  Impression       1. Multilevel discogenic and spondylitic disease with varying degrees of central canal and/or foraminal stenoses as described.  2. Grade 1 spondylolisthesis L4-5.  Spondylolysis not identified but there is severe facet degeneration at this level especially.  3. Multilevel facet osteoarthropathy.      Past Medical History:  No date: Carpal tunnel syndrome  No date: Depression  No date: Ectopic pregnancy  No date: High cholesterol  No  date: Hypertension  No date: Osteoarthritis  No date: PTSD (post-traumatic stress disorder)    Past Surgical History:  No date: OOPHORECTOMY    Review of patient's family history indicates:    Cancer                         Mother                    Heart disease                  Mother                    Heart disease                  Father                    Cancer                         Daughter                    Social History    Marital status:              Spouse name:                       Years of education:                 Number of children:               Social History Main Topics    Smoking status: Never Smoker                                                                Smokeless tobacco: Never Used                        Alcohol use: Yes                Comment: Socially    Drug use: No                Current Outpatient Prescriptions:  b complex vitamins tablet, Take 1 tablet by mouth once daily., Disp: , Rfl:    CALCIUM CARBONATE (CALCIUM 500 ORAL), Take by mouth., Disp: , Rfl:   clonazePAM (KLONOPIN) 0.5 MG tablet, Take 0.25 mg by mouth 2 (two) times daily., Disp: , Rfl:   cyclobenzaprine (FLEXERIL) 10 MG tablet, Take 0.5-1 tablets (5-10 mg total) by mouth 3 (three) times daily as needed for Muscle spasms., Disp: 90 tablet, Rfl: 0  etodolac (LODINE) 200 MG Cap, Take 1 capsule (200 mg total) by mouth 3 (three) times daily., Disp: 90 capsule, Rfl: 1  multivitamin capsule, Take 1 capsule by mouth once daily., Disp: , Rfl:   simvastatin (ZOCOR) 40 MG tablet, Take 40 mg by mouth once daily. , Disp: , Rfl:   telmisartan (MICARDIS) 80 MG Tab, Take by mouth once daily. , Disp: , Rfl:   traMADol (ULTRAM) 50 mg tablet, Take by mouth 2 (two) times daily as needed. , Disp: , Rfl:   traZODone (DESYREL) 100 MG tablet, Take 200 mg by mouth every evening., Disp: , Rfl:   vitamin D 1000 units Tab, Take 1,000 Units by mouth once daily., Disp: , Rfl:     No current facility-administered medications for this  visit.       Review of patient's allergies indicates:   -- Codeine -- Nausea Only   -- Pcn (penicillins) -- Itching            Review of Systems   Constitution: Negative for weight gain and weight loss.   Cardiovascular: Negative for chest pain.   Respiratory: Negative for shortness of breath.    Musculoskeletal: Positive for back pain. Negative for joint pain and joint swelling.   Gastrointestinal: Negative for abdominal pain and bowel incontinence.   Genitourinary: Negative for bladder incontinence.   Neurological: Positive for paresthesias (bilateral legs). Negative for numbness.           Objective:          General    Vitals reviewed.  Constitutional: She is oriented to person, place, and time. She appears well-developed and well-nourished.   HENT:   Head: Normocephalic and atraumatic.   Pulmonary/Chest: Effort normal.   Neurological: She is alert and oriented to person, place, and time.   Psychiatric: She has a normal mood and affect. Her behavior is normal. Judgment and thought content normal.     General Musculoskeletal Exam   Gait: normal (varus deformity of right knee)     Right Ankle/Foot Exam     Tests   Heel Walk: able to perform  Tiptoe Walk: able to perform    Left Ankle/Foot Exam     Tests   Heel Walk: able to perform  Tiptoe Walk: able to perform  Back (L-Spine & T-Spine) / Neck (C-Spine) Exam     Tenderness Right paramedian tenderness of the Sacrum and Lower L-Spine. Left paramedian tenderness of the Sacrum and Lower L-Spine.     Back (L-Spine & T-Spine) Range of Motion   Extension: 20   Flexion: 90   Lateral Bend Right: 20   Lateral Bend Left: 20   Rotation Right: 40   Rotation Left: 40     Spinal Sensation   Right Side Sensation  C-Spine Level: normal   L-Spine Level: normal  S-Spine Level: normal  Left Side Sensation  C-Spine Level: normal  L-Spine Level: normal  S-Spine Level: normal    Back (L-Spine & T-Spine) Tests   Right Side Tests  Straight leg raise:      Sitting SLR: > 70  degrees      Left Side Tests  Straight leg raise:     Sitting SLR: > 70 degrees          Other She has no scoliosis .  Spinal Kyphosis:  Absent      Muscle Strength   Right Upper Extremity   Biceps: 5/5/5   Deltoid:  5/5  Triceps:  5/5  Wrist Extension: 5/5/5   Finger Flexors:  5/5  Left Upper Extremity  Biceps: 5/5/5   Deltoid:  5/5  Triceps:  5/5  Wrist Extension: 5/5/5   Finger Flexors:  5/5  Right Lower Extremity   Hip Flexion: 5/5   Quadriceps:  5/5   Anterior tibial:  5/5/5  EHL:  5/5  Left Lower Extremity   Hip Flexion: 5/5   Quadriceps:  5/5   Anterior tibial:  5/5/5   EHL:  5/5    Reflexes     Left Side  Biceps:  2+  Triceps:  2+  Brachioradialis:  2+  Quadriceps:  2+  Achilles:  2+  Left Marks's Sign:  Absent  Babinski Sign:  absent    Right Side   Biceps:  2+  Triceps:  2+  Brachioradialis:  2+  Quadriceps:  2+  Achilles:  2+  Right Marks's Sign:  absent  Babinski Sign:  absent    Vascular Exam     Right Pulses        Carotid:                  2+    Left Pulses        Carotid:                  2+        Assessment:       Encounter Diagnoses   Name Primary?    Spinal stenosis of lumbar region with neurogenic claudication Yes    DDD (degenerative disc disease), lumbar     Neurogenic claudication     Chronic right-sided low back pain with bilateral sciatica     Osteoarthritis of spine with radiculopathy, lumbar region          Plan:       Joanne was seen today for back pain.    Diagnoses and all orders for this visit:    Spinal stenosis of lumbar region with neurogenic claudication  -     Ambulatory [facility]  -     Procedure Order to [facility]; Future    DDD (degenerative disc disease), lumbar  -     Ambulatory Consult to [facility]  -     Procedure Order to [facility]; Future    Neurogenic claudication  -     Ambulatory Consult to [facility]  -     Procedure Order to [facility]; Future    Chronic right-sided low back  pain with bilateral sciatica    Osteoarthritis of spine with radiculopathy, lumbar region    Other orders  -     cyclobenzaprine (FLEXERIL) 10 MG tablet; Take 0.5-1 tablets (5-10 mg total) by mouth 3 (three) times daily as needed for Muscle spasms.           1.  Continue Physical therapy: progressive resistance exercise pattern 1 at [facility], she ahs been to 11 visits  2.  Etodolac 200mg po TID  3. Cyclobenzaprine as needed  4.  MRI of the lumbar spine was reviewed, we discussed multilevel spinal stenosis.  We discussed talking to a surgeon. She is interested in considering surgery, we will get her scheduled to discuss with Dr. Gore.  The last Natalie did not help   5.  Increase Gabapentin 100-300 mg TID  6.  Repeat L5 bilateral TF NATALIE with pain  Management ( done on 3/22 gave her 85% relief for 2 weeks) the repeat injection did not help.  We will try facet injections due to spondylolisthesis and see if that helps.  Bilateral L3-4, L4-5, L5-S1 facet injections  7. Rolling walker as needed  8.  RTC 6 weeks

## 2018-05-30 ENCOUNTER — TELEPHONE (OUTPATIENT)
Dept: PAIN MEDICINE | Facility: CLINIC | Age: 63
End: 2018-05-30

## 2018-05-30 NOTE — TELEPHONE ENCOUNTER
Tried contacting patient to schedule for L3-4,L4-5,L5-S1 Facet injections referred by Dr. Matute got message mailbox is full unable to   contgact patient at this time.

## 2018-05-31 ENCOUNTER — CLINICAL SUPPORT (OUTPATIENT)
Dept: REHABILITATION | Facility: OTHER | Age: 63
End: 2018-05-31
Attending: PHYSICAL MEDICINE & REHABILITATION
Payer: MEDICARE

## 2018-05-31 DIAGNOSIS — G89.29 CHRONIC RIGHT-SIDED LOW BACK PAIN WITH BILATERAL SCIATICA: ICD-10-CM

## 2018-05-31 DIAGNOSIS — M54.41 CHRONIC RIGHT-SIDED LOW BACK PAIN WITH BILATERAL SCIATICA: ICD-10-CM

## 2018-05-31 DIAGNOSIS — M54.42 CHRONIC RIGHT-SIDED LOW BACK PAIN WITH BILATERAL SCIATICA: ICD-10-CM

## 2018-05-31 PROCEDURE — 97110 THERAPEUTIC EXERCISES: CPT | Performed by: PHYSICAL THERAPIST

## 2018-05-31 NOTE — PROGRESS NOTES
"[facility]      Name: Joanne Lino  Clinic Number: [id number]  Date of Treatment: 2018   Diagnosis:   Encounter Diagnosis   Name Primary?    Chronic right-sided low back pain with bilateral sciatica       Physician: Tammi Matute, *    Pain pattern determined: 1PEP (changed after reassessment 4/10/18 per centralization of symptoms with EIL)     Plan of care signed: Not signed as of 2018   Time in: 1000  Time Out:1100  Total Treatment time: 75  Precautions: HTN/OA, Grade 1-2 spondylolisthesis L4-5  Visit #: 16    POC due: Not signed as of 2018    Reassessment done: 4/10/18, 18  Reassessment due: 18 done 18  Next reassessment 18, done 18  Next due 18      Face to Face discussion of patient was done between PT and PTA.     Subjective   Joanne reports she is still having a lot of LB pain. She had a 2nd NATALIE without much change in symptoms.  She went to [place] for 2 weeks and used a scooter. She is thinking she might consider surgery at if her symptoms do not improve.     Patient reports their pain to be 4/10 on a 0-10 scale with 0 being no pain and 10 being the worst pain imaginable.    Pain Location: B LB/Lumbar    Occupation: No  Leisure: walk for exercise/play with [employer]/golfing/travel                     Pts goals:  "Be able to do more without pain , not fall"     Face to Face discussion of patient was done between PT and PTA.     Objective   Baseline IM Testing Results:   Date of testin18  ROM 18-42 deg   Max Peak Torque 84    Min Peak Torque 39    Flex/Ext Ratio 2.15   % below normative data 55     Femur 5      Midpoint IM Testing Results:   Date of testin2018  ROM 33-6   Max Peak Torque 105   Min Peak Torque 55   Flex/Ext Ratio 1.9   % below relative normative data -51   % Change from Initial Evaluation +61       Extension WNL and pain free, flexion tightness at end range     FOTO: " Focus on Therapeutic Outcomes   Category: lumbar   % Impaired: 60%  Current Score  = CK = at least 40% but < 60% impaired, limited or restricted  Goal at Discharge Score = CJ = at least 20% but < 40% impaired, limited or restricted    VISIT 5: 62%  VISIT 10: not completed     MOVEMENT LOSS 3/27/18    ROM Loss   Flexion WFL   Extension moderate loss c/ pain   Side bending Right Min loss inc pull   Side bending Left minimal loss inc pull   Rotation Right minimal loss   Rotation Left minimal loss          Treatment    Pt was instructed in and performed the following:     Joanne received therapeutic exercises to develop/improved posture, cardiovascular endurance, muscular endurance, lumbar/cervical ROM, strength and muscular endurance for 40 minutes including the following exercises:      [facility] 5/31/2018   Visit Number 16   VAS Pain Rating 3   Recumbent Bike Seat Pos. 12   Time 10   Flexion in Lying 10   Lumbar Weight 57   Repetitions 20   Rating of Perceived Exertion 3   Ice - Z Lie (in min.) 10     EIL 10x n/t today  LTR 10x  Bridge (Pr reports feeling some symptoms in the lower leg when pressing up, stopped around 5x)  Clamshell      Peripheral muscle strengthening which included 1 set of 15-20 repetitions at a slow, controlled 7 second per rep pace focused on strengthening supporting musculature for improved body mechanics and functional mobility.  Pt and therapist focused on proper form during treatment to ensure optimal strengthening of each targeted muscle group.  Machines were utilized including torso rotation, leg extension, leg curl, chest press, upright row. Tricep extension, bicep curl. Leg press, and hip abduction performed without difficulty today.       Joanne received the following manual therapy techniques: cupping to lower back, reviewed precautions again pt may purchase on [facility]      Home Exercise Program as follows:   Flexion in lying with theraball 10x, 3x/day   PPT supine, seated,  and standing 10x, 3x/day  Sidelying clamshells 15x  LTR 10x   Bridging 10x  EIL 10x     Handouts were given to the patient. Pt demo good understanding of the education provided. Joanne demonstrated good return demonstration of activities.     Lumbar roll use compliance: compliant with LR, ice pack, and theraball  Additional exercises taught this treatment session:   None     Assessment   Pt reports feeling somewhat better post therapy.  She was able to increase weight on the lumbar med x with no c/o LBP.  Cupping did help her today and she may want to order the cups. Issued her cupping order info.     Patient is making fair progress towards established goals.  Pt will continue to benefit from skilled outpatient physical therapy to address the deficits stated in the impairment chart, provide pt/family education and to maximize pt's level of independence in the home and community environment.       Pt's spiritual, cultural and educational needs considered and pt agreeable to plan of care and goals as stated below:     Medical necessity is demonstrated by the following problem list.    Pt presents with the following impairments:   History  Co-morbidities and personal factors that may impact the plan of care Examination  Body Structures and Functions, activity limitations and participation restrictions that may impact the plan of care Clinical Presentation    Decision Making/ Complexity Score   Co-morbidities:   OA/HTN     Personal Factors:   no deficits Body Regions:   back  lower extremities     Body Systems:   gross symmetry  ROM  strength  gross coordinated movement  gait  transfers  transitions  motor control     Activity limitations:   Learning and applying knowledge  no deficits     General Tasks and Commands  no deficits     Communication  no deficits     Mobility  lifting and carrying objects  walking     Self care  no deficits     Domestic Life  no deficits     Interactions/Relationships  no deficits     Life  Areas  no deficits     Community and Social Life  community life  recreation and leisure     Participation Restrictions:   Walking/standing > 5-10 min/bending fwd/stairs       evolving clinical presentation with changing clinical characteristics    moderate            GOALS: Pt is in agreement with the following goals.     Short term goals:  6 weeks or 10 visits   1.  Pt will demonstrate increased lumbar ROM by at least 3 degrees from the initial ROM value with improvements noted in functional ROM and ability to perform ADLs  2.  Pt will demonstrate increased maximum isometric torque value by 5% when compared to the initial value resulting in improved ability to perform bending, lifting, and carrying activities safely, confidently.  3.  Patient report a reduction in worst pain score by 1-2 points for improved tolerance during work and recreational activities  4.  Pt able to perform HEP correctly with minimal cueing or supervision for therapist     Long term goals: 13 weeks or 20 visits   1. Pt will demonstrate increased lumbar ROM by at least 6 degrees from initial ROM value, resulting in improved ability to perform functional fwd bending while standing and sitting.   2. Pt will demonstrate increased maximum isometric torque value by 10% when compared to the initial value resulting in improved ability to perform bending, lifting, and carrying activities safely, confidently.  3. Pt to demonstrate ability to independently control and reduce their pain through posture positioning and mechanical movements throughout a typical day.  4.  Patient will demonstrate improved overall function per FOTO Survey to CK = at least 40% but < 60% impaired, limited or restricted score or less.  5. Pt will be able to ambulate 10 minutes without experiencing LE numbness/weakness   6. Pt will be able to stairclimb with RG without pain      Plan   Continue with established Plan of Care towards established PT goals.

## 2018-06-04 ENCOUNTER — CLINICAL SUPPORT (OUTPATIENT)
Dept: REHABILITATION | Facility: OTHER | Age: 63
End: 2018-06-04
Attending: PHYSICAL MEDICINE & REHABILITATION
Payer: MEDICARE

## 2018-06-04 DIAGNOSIS — M54.42 CHRONIC RIGHT-SIDED LOW BACK PAIN WITH BILATERAL SCIATICA: ICD-10-CM

## 2018-06-04 DIAGNOSIS — M54.41 CHRONIC RIGHT-SIDED LOW BACK PAIN WITH BILATERAL SCIATICA: ICD-10-CM

## 2018-06-04 DIAGNOSIS — G89.29 CHRONIC RIGHT-SIDED LOW BACK PAIN WITH BILATERAL SCIATICA: ICD-10-CM

## 2018-06-04 PROCEDURE — 97110 THERAPEUTIC EXERCISES: CPT

## 2018-06-04 NOTE — PROGRESS NOTES
"[facility]      Name: Joanne Lino  Clinic Number: [id number]  Date of Treatment: 2018   Diagnosis:   Encounter Diagnosis   Name Primary?    Chronic right-sided low back pain with bilateral sciatica       Physician: Tammi Matute, *    Pain pattern determined: 1PEP (changed after reassessment 4/10/18 per centralization of symptoms with EIL)     Plan of care signed: Not signed as of 2018   Time in: 1007  Time Out:1107  Total Treatment time: 50  Precautions: HTN/OA, Grade 1-2 spondylolisthesis L4-5  Visit #: 17    POC due: Not signed as of 2018    Reassessment done: 4/10/18, 18  Reassessment due: 18 done 18  Next reassessment 18, done 18  Next due 18      Face to Face discussion of patient was done between PT and PTA.     Subjective   Joanne reports she is still having a lot of LB pain, but pain is less today.She has an appointment with a surgeon for possible back surgery this week.      Patient reports their pain to be 4/10 on a 0-10 scale with 0 being no pain and 10 being the worst pain imaginable.    Pain Location: B LB/Lumbar    Occupation: No  Leisure: walk for exercise/play with [employer]/golfing/travel                     Pts goals:  "Be able to do more without pain , not fall"     Face to Face discussion of patient was done between PT and PTA.     Objective   Baseline IM Testing Results:   Date of testin18  ROM 18-42 deg   Max Peak Torque 84    Min Peak Torque 39    Flex/Ext Ratio 2.15   % below normative data 55     Femur 5      Midpoint IM Testing Results:   Date of testin2018  ROM 33-6   Max Peak Torque 105   Min Peak Torque 55   Flex/Ext Ratio 1.9   % below relative normative data -51   % Change from Initial Evaluation +61       Extension WNL and pain free, flexion tightness at end range     FOTO: Focus on Therapeutic Outcomes   Category: lumbar   % Impaired: 60%  Current Score  = CK = " at least 40% but < 60% impaired, limited or restricted  Goal at Discharge Score = CJ = at least 20% but < 40% impaired, limited or restricted    VISIT 5: 62%  VISIT 10: not completed     MOVEMENT LOSS 3/27/18    ROM Loss   Flexion WFL   Extension moderate loss c/ pain   Side bending Right Min loss inc pull   Side bending Left minimal loss inc pull   Rotation Right minimal loss   Rotation Left minimal loss          Treatment    Pt was instructed in and performed the following:     Joanne received therapeutic exercises to develop/improved posture, cardiovascular endurance, muscular endurance, lumbar/cervical ROM, strength and muscular endurance for 40 minutes including the following exercises:      [facility] 6/4/2018   Visit Number 17   VAS Pain Rating 3   Recumbent Bike Seat Pos. 12   Time 10   Extension in Lying -   Extension in Standing -   Flexion in Lying 10   Manual Therapy -   Lumbar Extension Seat Pad -   Femur Restraint -   Top Dead Center -   Counterweight -   Lumbar Flexion -   Lumbar Extension -   Lumbar Peak Torque -   Min Torque -   Percent From Norm -   Percent Change from Initial -   Lumbar Weight 71   Repetitions 20   Rating of Perceived Exertion 3   Ice - Z Lie (in min.) 10   EIL 10x (Defer at this time due to pain with trail 6/4/18)  LTR 10x  Bridge (Pt reports feeling some symptoms in the lower leg when pressing up, stopped around 5x)  Clamshell      Peripheral muscle strengthening which included 1 set of 15-20 repetitions at a slow, controlled 7 second per rep pace focused on strengthening supporting musculature for improved body mechanics and functional mobility.  Pt and therapist focused on proper form during treatment to ensure optimal strengthening of each targeted muscle group.  Machines were utilized including torso rotation, leg extension, leg curl, chest press, upright row. Tricep extension, bicep curl. Leg press, and hip abduction performed without difficulty today.        Joanne received the following manual therapy techniques: Not today-cupping to lower back, reviewed precautions again pt may purchase on [employer]      Home Exercise Program as follows:   Flexion in lying with theraball 10x, 3x/day   PPT supine, seated, and standing 10x, 3x/day  Sidelying clamshells 15x  LTR 10x   Bridging 10x  EIL 10x (Defer at this time due to pain with trail 6/4/18)      Handouts were given to the patient. Pt demo good understanding of the education provided. Joanne demonstrated good return demonstration of activities.     Lumbar roll use compliance: compliant with LR, ice pack, and theraball  Additional exercises taught this treatment session:   None     Assessment   Pt reports feeling somewhat better post therapy.  She was able to increase weight on the lumbar med x with no c/o LBP.  Cupping did help her today and she may want to order the cups. Issued her cupping order info.     Patient is making fair progress towards established goals.  Pt will continue to benefit from skilled outpatient physical therapy to address the deficits stated in the impairment chart, provide pt/family education and to maximize pt's level of independence in the home and community environment.       Pt's spiritual, cultural and educational needs considered and pt agreeable to plan of care and goals as stated below:     Medical necessity is demonstrated by the following problem list.    Pt presents with the following impairments:   History  Co-morbidities and personal factors that may impact the plan of care Examination  Body Structures and Functions, activity limitations and participation restrictions that may impact the plan of care Clinical Presentation    Decision Making/ Complexity Score   Co-morbidities:   OA/HTN     Personal Factors:   no deficits Body Regions:   back  lower extremities     Body Systems:   gross symmetry  ROM  strength  gross coordinated movement  gait  transfers  transitions  motor  control     Activity limitations:   Learning and applying knowledge  no deficits     General Tasks and Commands  no deficits     Communication  no deficits     Mobility  lifting and carrying objects  walking     Self care  no deficits     Domestic Life  no deficits     Interactions/Relationships  no deficits     Life Areas  no deficits     Community and Social Life  community life  recreation and leisure     Participation Restrictions:   Walking/standing > 5-10 min/bending fwd/stairs       evolving clinical presentation with changing clinical characteristics    moderate            GOALS: Pt is in agreement with the following goals.     Short term goals:  6 weeks or 10 visits   1.  Pt will demonstrate increased lumbar ROM by at least 3 degrees from the initial ROM value with improvements noted in functional ROM and ability to perform ADLs  2.  Pt will demonstrate increased maximum isometric torque value by 5% when compared to the initial value resulting in improved ability to perform bending, lifting, and carrying activities safely, confidently.  3.  Patient report a reduction in worst pain score by 1-2 points for improved tolerance during work and recreational activities  4.  Pt able to perform HEP correctly with minimal cueing or supervision for therapist     Long term goals: 13 weeks or 20 visits   1. Pt will demonstrate increased lumbar ROM by at least 6 degrees from initial ROM value, resulting in improved ability to perform functional fwd bending while standing and sitting.   2. Pt will demonstrate increased maximum isometric torque value by 10% when compared to the initial value resulting in improved ability to perform bending, lifting, and carrying activities safely, confidently.  3. Pt to demonstrate ability to independently control and reduce their pain through posture positioning and mechanical movements throughout a typical day.  4.  Patient will demonstrate improved overall function per FOTO Survey to CK  = at least 40% but < 60% impaired, limited or restricted score or less.  5. Pt will be able to ambulate 10 minutes without experiencing LE numbness/weakness   6. Pt will be able to stairclimb with RG without pain      Plan   Continue with established Plan of Care towards established PT goals.

## 2018-06-07 ENCOUNTER — CLINICAL SUPPORT (OUTPATIENT)
Dept: REHABILITATION | Facility: OTHER | Age: 63
End: 2018-06-07
Attending: PHYSICAL MEDICINE & REHABILITATION
Payer: MEDICARE

## 2018-06-07 DIAGNOSIS — G89.29 CHRONIC RIGHT-SIDED LOW BACK PAIN WITH BILATERAL SCIATICA: ICD-10-CM

## 2018-06-07 DIAGNOSIS — M54.42 CHRONIC RIGHT-SIDED LOW BACK PAIN WITH BILATERAL SCIATICA: ICD-10-CM

## 2018-06-07 DIAGNOSIS — M54.41 CHRONIC RIGHT-SIDED LOW BACK PAIN WITH BILATERAL SCIATICA: ICD-10-CM

## 2018-06-07 PROCEDURE — 97110 THERAPEUTIC EXERCISES: CPT

## 2018-06-07 NOTE — PROGRESS NOTES
"[facility]      Name: Joanne Lino  Clinic Number: [id number]  Date of Treatment: 2018   Diagnosis:   Encounter Diagnosis   Name Primary?    Chronic right-sided low back pain with bilateral sciatica       Physician: Tammi Matute, *    Pain pattern determined: 1PEP (changed after reassessment 4/10/18 per centralization of symptoms with EIL)     Plan of care signed: Not signed as of 2018   Time in: 10am  Time Out:11am  Total Treatment time: 60  Precautions: HTN/OA, Grade 1-2 spondylolisthesis L4-5  Visit #: 18    POC due: Not signed as of 2018    Reassessment done: 4/10/18, 18  Reassessment due: 18 done 18  Next reassessment 18, done 18  Next due 18      Face to Face discussion of patient was done between PT and PTA.     Subjective   Joanne reports she is still having a lot of LB pain.  She is awaiitng injections and surgical consult this month.  Patient reports their pain to be 4/10 on a 0-10 scale with 0 being no pain and 10 being the worst pain imaginable.    Pain Location: B LB/Lumbar    Occupation: No  Leisure: walk for exercise/play with [employer]/golfing/travel                     Pts goals:  "Be able to do more without pain , not fall"     Face to Face discussion of patient was done between PT and PTA.     Objective   Baseline IM Testing Results:   Date of testin18  ROM 18-42 deg   Max Peak Torque 84    Min Peak Torque 39    Flex/Ext Ratio 2.15   % below normative data 55     Femur 5      Midpoint IM Testing Results:   Date of testin2018  ROM 33-6   Max Peak Torque 105   Min Peak Torque 55   Flex/Ext Ratio 1.9   % below relative normative data -51   % Change from Initial Evaluation +61       Extension WNL and pain free, flexion tightness at end range     FOTO: Focus on Therapeutic Outcomes   Category: lumbar   % Impaired: 60%  Current Score  = CK = at least 40% but < 60% impaired, limited " or restricted  Goal at Discharge Score = CJ = at least 20% but < 40% impaired, limited or restricted    VISIT 5: 62%  VISIT 10: not completed     MOVEMENT LOSS 3/27/18    ROM Loss   Flexion WFL   Extension moderate loss c/ pain   Side bending Right Min loss inc pull   Side bending Left minimal loss inc pull   Rotation Right minimal loss   Rotation Left minimal loss          Treatment    Pt was instructed in and performed the following:     Joanne received therapeutic exercises to develop/improved posture, cardiovascular endurance, muscular endurance, lumbar/cervical ROM, strength and muscular endurance for 40 minutes including the following exercises:    [facility] 6/7/2018   Visit Number 18   VAS Pain Rating 4   Recumbent Bike Seat Pos. 12   Time 10   Extension in Lying -   Extension in Standing -   Flexion in Lying 10   Manual Therapy -   Lumbar Extension Seat Pad -   Femur Restraint -   Top Dead Center -   Counterweight -   Lumbar Flexion -   Lumbar Extension -   Lumbar Peak Torque -   Min Torque -   Percent From Norm -   Percent Change from Initial -   Lumbar Weight 60   Repetitions 20   Rating of Perceived Exertion 3   Ice - Z Lie (in min.) 10       EIL 10x (Defer at this time due to pain with trail 6/4/18)  LTR 10x  Bridge (Pt reports feeling some symptoms in the lower leg when pressing up, stopped around 5x)  Clamshell      Peripheral muscle strengthening which included 1 set of 15-20 repetitions at a slow, controlled 7 second per rep pace focused on strengthening supporting musculature for improved body mechanics and functional mobility.  Pt and therapist focused on proper form during treatment to ensure optimal strengthening of each targeted muscle group.  Machines were utilized including torso rotation, leg extension, leg curl, chest press, upright row. Tricep extension, bicep curl. Leg press, and hip abduction performed without difficulty today.       Joanne received the following manual  therapy techniques: Not today-cupping to lower back, reviewed precautions again pt may purchase on [employer]      Home Exercise Program as follows:   Flexion in lying with theraball 10x, 3x/day   PPT supine, seated, and standing 10x, 3x/day  Sidelying clamshells 15x  LTR 10x   Bridging 10x  EIL 10x (Defer at this time due to pain with trail 6/4/18)      Handouts were given to the patient. Pt demo good understanding of the education provided. Joanne demonstrated good return demonstration of activities.     Lumbar roll use compliance: compliant with LR, ice pack, and theraball  Additional exercises taught this treatment session:   None     Assessment   Pt tolerated treatment well today despite pain level.  Pt states pain has not significantly reduced and she is seeking other interventions, ie injections and surgical consult. Pt able to inc 5% today on Med X weights with min difficulty.  Patient is making fair progress towards established goals.  Pt will continue to benefit from skilled outpatient physical therapy to address the deficits stated in the impairment chart, provide pt/family education and to maximize pt's level of independence in the home and community environment.       Pt's spiritual, cultural and educational needs considered and pt agreeable to plan of care and goals as stated below:     Medical necessity is demonstrated by the following problem list.    Pt presents with the following impairments:   History  Co-morbidities and personal factors that may impact the plan of care Examination  Body Structures and Functions, activity limitations and participation restrictions that may impact the plan of care Clinical Presentation    Decision Making/ Complexity Score   Co-morbidities:   OA/HTN     Personal Factors:   no deficits Body Regions:   back  lower extremities     Body Systems:   gross symmetry  ROM  strength  gross coordinated movement  gait  transfers  transitions  motor control     Activity  limitations:   Learning and applying knowledge  no deficits     General Tasks and Commands  no deficits     Communication  no deficits     Mobility  lifting and carrying objects  walking     Self care  no deficits     Domestic Life  no deficits     Interactions/Relationships  no deficits     Life Areas  no deficits     Community and Social Life  community life  recreation and leisure     Participation Restrictions:   Walking/standing > 5-10 min/bending fwd/stairs       evolving clinical presentation with changing clinical characteristics    moderate            GOALS: Pt is in agreement with the following goals.     Short term goals:  6 weeks or 10 visits   1.  Pt will demonstrate increased lumbar ROM by at least 3 degrees from the initial ROM value with improvements noted in functional ROM and ability to perform ADLs  2.  Pt will demonstrate increased maximum isometric torque value by 5% when compared to the initial value resulting in improved ability to perform bending, lifting, and carrying activities safely, confidently.  3.  Patient report a reduction in worst pain score by 1-2 points for improved tolerance during work and recreational activities  4.  Pt able to perform HEP correctly with minimal cueing or supervision for therapist     Long term goals: 13 weeks or 20 visits   1. Pt will demonstrate increased lumbar ROM by at least 6 degrees from initial ROM value, resulting in improved ability to perform functional fwd bending while standing and sitting.   2. Pt will demonstrate increased maximum isometric torque value by 10% when compared to the initial value resulting in improved ability to perform bending, lifting, and carrying activities safely, confidently.  3. Pt to demonstrate ability to independently control and reduce their pain through posture positioning and mechanical movements throughout a typical day.  4.  Patient will demonstrate improved overall function per FOTO Survey to CK = at least 40% but <  60% impaired, limited or restricted score or less.  5. Pt will be able to ambulate 10 minutes without experiencing LE numbness/weakness   6. Pt will be able to stairclimb with RG without pain      Plan   Continue with established Plan of Care towards established PT goals.   o

## 2018-06-08 ENCOUNTER — SURGERY (OUTPATIENT)
Age: 63
End: 2018-06-08

## 2018-06-08 ENCOUNTER — HOSPITAL ENCOUNTER (OUTPATIENT)
Facility: OTHER | Age: 63
Discharge: HOME | End: 2018-06-08
Attending: ANESTHESIOLOGY | Admitting: ANESTHESIOLOGY
Payer: MEDICARE

## 2018-06-08 VITALS
WEIGHT: 190 LBS | DIASTOLIC BLOOD PRESSURE: 61 MMHG | HEIGHT: 62 IN | SYSTOLIC BLOOD PRESSURE: 118 MMHG | HEART RATE: 105 BPM | TEMPERATURE: 98 F | OXYGEN SATURATION: 96 % | BODY MASS INDEX: 34.96 KG/M2 | RESPIRATION RATE: 18 BRPM

## 2018-06-08 DIAGNOSIS — M47.816 LUMBAR SPONDYLOSIS: Primary | ICD-10-CM

## 2018-06-08 PROCEDURE — 64495 INJ PARAVERT F JNT L/S 3 LEV: CPT | Mod: 50,,, | Performed by: ANESTHESIOLOGY

## 2018-06-08 PROCEDURE — 64493 INJ PARAVERT F JNT L/S 1 LEV: CPT | Mod: 50 | Performed by: ANESTHESIOLOGY

## 2018-06-08 PROCEDURE — S0020 INJECTION, BUPIVICAINE HYDRO: HCPCS | Performed by: ANESTHESIOLOGY

## 2018-06-08 PROCEDURE — 25000003 PHARM REV CODE 250: Performed by: ANESTHESIOLOGY

## 2018-06-08 PROCEDURE — 64495 INJ PARAVERT F JNT L/S 3 LEV: CPT | Mod: 50 | Performed by: ANESTHESIOLOGY

## 2018-06-08 PROCEDURE — 25500020 PHARM REV CODE 255: Performed by: ANESTHESIOLOGY

## 2018-06-08 PROCEDURE — 64494 INJ PARAVERT F JNT L/S 2 LEV: CPT | Mod: 50 | Performed by: ANESTHESIOLOGY

## 2018-06-08 PROCEDURE — 63600175 PHARM REV CODE 636 W HCPCS: Performed by: ANESTHESIOLOGY

## 2018-06-08 PROCEDURE — 64494 INJ PARAVERT F JNT L/S 2 LEV: CPT | Mod: 50,,, | Performed by: ANESTHESIOLOGY

## 2018-06-08 PROCEDURE — 64493 INJ PARAVERT F JNT L/S 1 LEV: CPT | Mod: 50,,, | Performed by: ANESTHESIOLOGY

## 2018-06-08 RX ORDER — ALPRAZOLAM 0.5 MG/1
1 TABLET, ORALLY DISINTEGRATING ORAL
Status: DISCONTINUED | OUTPATIENT
Start: 2018-06-08 | End: 2018-06-08 | Stop reason: HOSPADM

## 2018-06-08 RX ORDER — BUPIVACAINE HYDROCHLORIDE 5 MG/ML
INJECTION, SOLUTION EPIDURAL; INTRACAUDAL
Status: DISCONTINUED | OUTPATIENT
Start: 2018-06-08 | End: 2018-06-08 | Stop reason: HOSPADM

## 2018-06-08 RX ORDER — METHYLPREDNISOLONE ACETATE 80 MG/ML
INJECTION, SUSPENSION INTRA-ARTICULAR; INTRALESIONAL; INTRAMUSCULAR; SOFT TISSUE
Status: DISCONTINUED | OUTPATIENT
Start: 2018-06-08 | End: 2018-06-08 | Stop reason: HOSPADM

## 2018-06-08 RX ORDER — LIDOCAINE HYDROCHLORIDE 10 MG/ML
INJECTION INFILTRATION; PERINEURAL
Status: DISCONTINUED | OUTPATIENT
Start: 2018-06-08 | End: 2018-06-08 | Stop reason: HOSPADM

## 2018-06-08 RX ADMIN — ALPRAZOLAM 1 MG: 0.5 TABLET, ORALLY DISINTEGRATING ORAL at 02:06

## 2018-06-08 RX ADMIN — BUPIVACAINE HYDROCHLORIDE 10 ML: 5 INJECTION, SOLUTION EPIDURAL; INTRACAUDAL; PERINEURAL at 02:06

## 2018-06-08 RX ADMIN — LIDOCAINE HYDROCHLORIDE 10 ML: 10 INJECTION, SOLUTION INFILTRATION; PERINEURAL at 02:06

## 2018-06-08 RX ADMIN — SODIUM BICARBONATE 4 MEQ: 0.2 INJECTION, SOLUTION INTRAVENOUS at 02:06

## 2018-06-08 RX ADMIN — IOHEXOL 10 ML: 300 INJECTION, SOLUTION INTRAVENOUS at 02:06

## 2018-06-08 RX ADMIN — METHYLPREDNISOLONE ACETATE 80 MG: 80 INJECTION, SUSPENSION INTRA-ARTICULAR; INTRALESIONAL; INTRAMUSCULAR; SOFT TISSUE at 02:06

## 2018-06-08 NOTE — OP NOTE
"Date of procedure: 06/08/2018    Procedure: Bilateral L3-4, L4-5, and L5-S1 Facet joint injection     Pre-op diagnosis: DDD (degenerative disc disease), lumbar [M51.36]     Post-op diagnosis: DDD (degenerative disc disease), lumbar [M51.36]     Physician: Dr. Edwina Williamson     Assistant: None    Anesthestia: local    EBL: None    Specimens: None    All medications, allergies, and relevant histories were reviewed. No recent antibiotics or infections. A time-out was taken to verify the correct patient, procedure, laterality, and appropriate medications/allergies.     Fluoroscopically-Guided, Contrast-Controlled Lumbar Facet Joint Injection:     Following denial of allergy and review of potential side effects and complications including but not necessarily limited to infection, allergic reaction, local tissue breakdown, nerve injury, paresis, paralysis, spinal cord injury, and seizure, the patient indicated they understood and agreed to proceed.     Patient was placed in prone position. Lumbar area was prepped and draped in sterile manner. The level was determined under fluoroscopic guidance. Using a 25 gauge 1.5" needle, bicarbonated Xylocaine 1% was given subcutaneously at the level L3-4 on the right. The 3.5in 22-gauge needle was introduced into the right L3-4 facet joint. Following negative aspiration for blood and CSF and confirming the absence of paresthesias, injection of approximately 1 cc of Omnipaque 300 demonstrated intra-articular spread without vascular or intrathecal uptake. At this point, 1cc of a mixture of 5 cc of 0.5% marcaine with 80 mg of Depo-Medrol was injected without complication. The same procedure was performed in an identical fashion on the right L4-5,right L5-S1, left L3-4, left L4-5, and left L5-S1 joints sequentially.     Patient tolerated the procedure well without any side effects. No noted complications.     The patient was followed post procedure and discharged under their own power in " excellent condition.     Future Management:   If helpful, can repeat as needed. Can consider RFA  Follow up with Dr. Matute.

## 2018-06-08 NOTE — DISCHARGE INSTRUCTIONS
Thank you for allowing us to care for you today. You may receive a survey about the care we provided. Your feedback is valuable and helps us provide excellent care throughout the community. Home Care Instructions Pain Management:    1. DIET:   You may resume your normal diet today.   2. BATHING:   You may shower with luke warm water.  3. DRESSING:   You may remove your bandage today.   4. ACTIVITY LEVEL:   You may resume your normal activities 24 hrs after your procedure.  5. MEDICATIONS:   You may resume your normal medications today.   6. SPECIAL INSTRUCTIONS:   No heat to the injection site for 24 hrs including, bath or shower, heating pad, moist heat, or hot tubs.    Use ice pack to injection site for any pain or discomfort.  Apply ice packs for 20 minute intervals as needed.   If you have received any sedatives by mouth today you may not drive for 12 hours.    If you have received any sedation through your IV, you may not drive for 24 hrs.     PLEASE CALL YOUR DOCTOR IF:  1. Redness or swelling around the injection site.  2. Fever of 101 degrees  3. Drainage (pus) from the injection site.  4. For any continuous bleeding (some dried blood over the incision is normal.)    FOR EMERGENCIES:   If any unusual problems or difficulties occur during clinic hours, call [phone] or [phone].

## 2018-06-11 ENCOUNTER — CLINICAL SUPPORT (OUTPATIENT)
Dept: REHABILITATION | Facility: OTHER | Age: 63
End: 2018-06-11
Attending: PHYSICAL MEDICINE & REHABILITATION
Payer: MEDICARE

## 2018-06-11 DIAGNOSIS — G89.29 CHRONIC RIGHT-SIDED LOW BACK PAIN WITH BILATERAL SCIATICA: ICD-10-CM

## 2018-06-11 DIAGNOSIS — M54.42 CHRONIC RIGHT-SIDED LOW BACK PAIN WITH BILATERAL SCIATICA: ICD-10-CM

## 2018-06-11 DIAGNOSIS — M54.41 CHRONIC RIGHT-SIDED LOW BACK PAIN WITH BILATERAL SCIATICA: ICD-10-CM

## 2018-06-11 PROCEDURE — 97110 THERAPEUTIC EXERCISES: CPT

## 2018-06-11 NOTE — PLAN OF CARE
"[facility]      Name: Joanne Lino  Clinic Number: [id number]  Date of Treatment: 2018   Diagnosis:   Encounter Diagnosis   Name Primary?    Chronic right-sided low back pain with bilateral sciatica       Physician: Tammi Matute, *    Pain pattern determined: 1PEP (changed after reassessment 4/10/18 per centralization of symptoms with EIL)     Plan of care signed: Not signed as of 2018   Time in: 10am  Time Out:11am  Total Treatment time: 60  Precautions: HTN/OA, Grade 1-2 spondylolisthesis L4-5  Visit #: 19    POC due: Not signed as of 2018, resent 2018    Reassessment done: 4/10/18, 18  Reassessment due: 18 done 18  Next reassessment 18, done 18  Next due 18      Face to Face discussion of patient was done between PT and PTA.     Subjective   Joanne reports she had injections on Friday in the joints and her back is feeling so much better. Yes[place] she went [place] and was able to walk about 2 miles from [facility] to the [place]. She went grogery shopping as well without assistance. She is not walking with her walker and didn't need it over the weekend  Patient reports their pain to be -/10 on a 0-10 scale with 0 being no pain and 10 being the worst pain imaginable.    Pain Location: B LB/Lumbar    Occupation: No  Leisure: walk for exercise/play with [employer]/golfing/travel                     Pts goals:  "Be able to do more without pain , not fall"     Face to Face discussion of patient was done between PT and PTA.     Objective   Baseline IM Testing Results:   Date of testin18  ROM 18-42 deg   Max Peak Torque 84    Min Peak Torque 39    Flex/Ext Ratio 2.15   % below normative data 55     Femur 5      Midpoint IM Testing Results:   Date of testin2018  ROM 33-6   Max Peak Torque 105   Min Peak Torque 55   Flex/Ext Ratio 1.9   % below relative normative data -51 "   % Change from Initial Evaluation +61       Extension WNL and pain free, flexion tightness at end range     FOTO: Focus on Therapeutic Outcomes   Category: lumbar   % Impaired: 60%  Current Score  = CK = at least 40% but < 60% impaired, limited or restricted  Goal at Discharge Score = CJ = at least 20% but < 40% impaired, limited or restricted    VISIT 5: 62%  VISIT 10: not completed     MOVEMENT LOSS 3/27/18    ROM Loss   Flexion WFL   Extension moderate loss c/ pain   Side bending Right Min loss inc pull   Side bending Left minimal loss inc pull   Rotation Right minimal loss   Rotation Left minimal loss          Treatment    Pt was instructed in and performed the following:     Joanne received therapeutic exercises to develop/improved posture, cardiovascular endurance, muscular endurance, lumbar/cervical ROM, strength and muscular endurance for 40 minutes including the following exercises:      [facility] 6/11/2018   Visit Number 19   VAS Pain Rating -   Recumbent Bike Seat Pos. 13   Time 10   Extension in Lying -   Extension in Standing -   Flexion in Lying 10   Manual Therapy -   Lumbar Extension Seat Pad -   Femur Restraint -   Top Dead Center -   Counterweight -   Lumbar Flexion -   Lumbar Extension -   Lumbar Peak Torque -   Min Torque -   Percent From Norm -   Percent Change from Initial -   Lumbar Weight 63   Repetitions 20   Rating of Perceived Exertion 2.5   Ice - Z Lie (in min.) 10         EIL 10x (Defer at this time due to pain with trail 6/4/18)  LTR 10x  Bridge   PPT  Clamshell      Peripheral muscle strengthening which included 1 set of 15-20 repetitions at a slow, controlled 7 second per rep pace focused on strengthening supporting musculature for improved body mechanics and functional mobility.  Pt and therapist focused on proper form during treatment to ensure optimal strengthening of each targeted muscle group.  Machines were utilized including torso rotation, leg extension, leg curl,  chest press, upright row. Tricep extension, bicep curl. Leg press, and hip abduction performed without difficulty today.       Joanne received the following manual therapy techniques: Not today-cupping to lower back, reviewed precautions again pt may purchase on [facility] [employer]      Home Exercise Program as follows:   Flexion in lying with theraball 10x, 3x/day   PPT supine, seated, and standing 10x, 3x/day  Sidelying clamshells 15x  LTR 10x   Bridging 10x  EIL 10x (Defer at this time due to pain with trail 6/4/18)      Handouts were given to the patient. Pt demo good understanding of the education provided. Joanne demonstrated good return demonstration of activities.     Lumbar roll use compliance: compliant with LR, ice pack, and theraball  Additional exercises taught this treatment session:   None     Assessment   Pt tolerated treatment well today despite pain level.  Pt states pain has not significantly reduced and she is seeking other interventions, ie injections and surgical consult. Pt able to inc 5% today on Med X weights with min difficulty.  Patient is making fair progress towards established goals.  Pt will continue to benefit from skilled outpatient physical therapy to address the deficits stated in the impairment chart, provide pt/family education and to maximize pt's level of independence in the home and community environment.       Pt's spiritual, cultural and educational needs considered and pt agreeable to plan of care and goals as stated below:     Medical necessity is demonstrated by the following problem list.    Pt presents with the following impairments:   History  Co-morbidities and personal factors that may impact the plan of care Examination  Body Structures and Functions, activity limitations and participation restrictions that may impact the plan of care Clinical Presentation    Decision Making/ Complexity Score   Co-morbidities:   OA/HTN     Personal Factors:   no deficits Body Regions:    back  lower extremities     Body Systems:   gross symmetry  ROM  strength  gross coordinated movement  gait  transfers  transitions  motor control     Activity limitations:   Learning and applying knowledge  no deficits     General Tasks and Commands  no deficits     Communication  no deficits     Mobility  lifting and carrying objects  walking     Self care  no deficits     Domestic Life  no deficits     Interactions/Relationships  no deficits     Life Areas  no deficits     Community and Social Life  community life  recreation and leisure     Participation Restrictions:   Walking/standing > 5-10 min/bending fwd/stairs       evolving clinical presentation with changing clinical characteristics    moderate            GOALS: Pt is in agreement with the following goals.     Short term goals:  6 weeks or 10 visits   1.  Pt will demonstrate increased lumbar ROM by at least 3 degrees from the initial ROM value with improvements noted in functional ROM and ability to perform ADLs (Met 4/12/2018)  2.  Pt will demonstrate increased maximum isometric torque value by 5% when compared to the initial value resulting in improved ability to perform bending, lifting, and carrying activities safely, confidently.  (Met 4/12/2018)  3.  Patient report a reduction in worst pain score by 1-2 points for improved tolerance during work and recreational activities (MEt 6/11/2018)  4.  Pt able to perform HEP correctly with minimal cueing or supervision for therapist     Long term goals: 13 weeks or 20 visits   1. Pt will demonstrate increased lumbar ROM by at least 6 degrees from initial ROM value, resulting in improved ability to perform functional fwd bending while standing and sitting.   2. Pt will demonstrate increased maximum isometric torque value by 10% when compared to the initial value resulting in improved ability to perform bending, lifting, and carrying activities safely, confidently.  3. Pt to demonstrate ability to  independently control and reduce their pain through posture positioning and mechanical movements throughout a typical day.  4.  Patient will demonstrate improved overall function per FOTO Survey to CK = at least 40% but < 60% impaired, limited or restricted score or less.  5. Pt will be able to ambulate 10 minutes without experiencing LE numbness/weakness   6. Pt will be able to stairclimb with RG without pain      Plan   Continue with established Plan of Care towards established PT goals.   o

## 2018-06-11 NOTE — PROGRESS NOTES
"[facility]      Name: Joanne Lino  Clinic Number: [id number]  Date of Treatment: 2018   Diagnosis:   Encounter Diagnosis   Name Primary?    Chronic right-sided low back pain with bilateral sciatica       Physician: Tammi Matute, *    Pain pattern determined: 1PEP (changed after reassessment 4/10/18 per centralization of symptoms with EIL)     Plan of care signed: Not signed as of 2018   Time in: 10am  Time Out:11am  Total Treatment time: 60  Precautions: HTN/OA, Grade 1-2 spondylolisthesis L4-5  Visit #: 19    POC due: Not signed as of 2018, resent 2018    Reassessment done: 4/10/18, 18  Reassessment due: 18 done 18  Next reassessment 18, done 18  Next due 18      Face to Face discussion of patient was done between PT and PTA.     Subjective   Joanne reports she had injections on Friday in the joints and her back is feeling so much better. Yesterday she went to the [place] and was able to walk about 2 miles from [facility] to the [employer]. She went grogery shopping as well without assistance. She is not walking with her walker and didn't need it over the weekend  Patient reports their pain to be -/10 on a 0-10 scale with 0 being no pain and 10 being the worst pain imaginable.    Pain Location: B LB/Lumbar    Occupation: No  Leisure: walk for exercise/play with [employer]/golfing/travel                     Pts goals:  "Be able to do more without pain , not fall"     Face to Face discussion of patient was done between PT and PTA.     Objective   Baseline IM Testing Results:   Date of testin18  ROM 18-42 deg   Max Peak Torque 84    Min Peak Torque 39    Flex/Ext Ratio 2.15   % below normative data 55     Femur 5      Midpoint IM Testing Results:   Date of testin2018  ROM 33-6   Max Peak Torque 105   Min Peak Torque 55   Flex/Ext Ratio 1.9   % below relative normative data -51 "   % Change from Initial Evaluation +61       Extension WNL and pain free, flexion tightness at end range     FOTO: Focus on Therapeutic Outcomes   Category: lumbar   % Impaired: 60%  Current Score  = CK = at least 40% but < 60% impaired, limited or restricted  Goal at Discharge Score = CJ = at least 20% but < 40% impaired, limited or restricted    VISIT 5: 62%  VISIT 10: not completed     MOVEMENT LOSS 3/27/18    ROM Loss   Flexion WFL   Extension moderate loss c/ pain   Side bending Right Min loss inc pull   Side bending Left minimal loss inc pull   Rotation Right minimal loss   Rotation Left minimal loss          Treatment    Pt was instructed in and performed the following:     Joanne received therapeutic exercises to develop/improved posture, cardiovascular endurance, muscular endurance, lumbar/cervical ROM, strength and muscular endurance for 40 minutes including the following exercises:      [facility] 6/11/2018   Visit Number 19   VAS Pain Rating -   Recumbent Bike Seat Pos. 13   Time 10   Extension in Lying -   Extension in Standing -   Flexion in Lying 10   Manual Therapy -   Lumbar Extension Seat Pad -   Femur Restraint -   Top Dead Center -   Counterweight -   Lumbar Flexion -   Lumbar Extension -   Lumbar Peak Torque -   Min Torque -   Percent From Norm -   Percent Change from Initial -   Lumbar Weight 63   Repetitions 20   Rating of Perceived Exertion 2.5   Ice - Z Lie (in min.) 10         EIL 10x (Defer at this time due to pain with trail 6/4/18)  LTR 10x  Bridge   PPT  Clamshell      Peripheral muscle strengthening which included 1 set of 15-20 repetitions at a slow, controlled 7 second per rep pace focused on strengthening supporting musculature for improved body mechanics and functional mobility.  Pt and therapist focused on proper form during treatment to ensure optimal strengthening of each targeted muscle group.  Machines were utilized including torso rotation, leg extension, leg curl,  chest press, upright row. Tricep extension, bicep curl. Leg press, and hip abduction performed without difficulty today.       Joanne received the following manual therapy techniques: Not today-cupping to lower back, reviewed precautions again pt may purchase on [employer]      Home Exercise Program as follows:   Flexion in lying with theraball 10x, 3x/day   PPT supine, seated, and standing 10x, 3x/day  Sidelying clamshells 15x  LTR 10x   Bridging 10x  EIL 10x (Defer at this time due to pain with trail 6/4/18)      Handouts were given to the patient. Pt demo good understanding of the education provided. Joanne demonstrated good return demonstration of activities.     Lumbar roll use compliance: compliant with LR, ice pack, and theraball  Additional exercises taught this treatment session:   None     Assessment   Pt tolerated treatment well today despite pain level.  Pt states pain has not significantly reduced and she is seeking other interventions, ie injections and surgical consult. Pt able to inc 5% today on Med X weights with min difficulty.  Patient is making fair progress towards established goals.  Pt will continue to benefit from skilled outpatient physical therapy to address the deficits stated in the impairment chart, provide pt/family education and to maximize pt's level of independence in the home and community environment.       Pt's spiritual, cultural and educational needs considered and pt agreeable to plan of care and goals as stated below:     Medical necessity is demonstrated by the following problem list.    Pt presents with the following impairments:   History  Co-morbidities and personal factors that may impact the plan of care Examination  Body Structures and Functions, activity limitations and participation restrictions that may impact the plan of care Clinical Presentation    Decision Making/ Complexity Score   Co-morbidities:   OA/HTN     Personal Factors:   no deficits Body Regions:    back  lower extremities     Body Systems:   gross symmetry  ROM  strength  gross coordinated movement  gait  transfers  transitions  motor control     Activity limitations:   Learning and applying knowledge  no deficits     General Tasks and Commands  no deficits     Communication  no deficits     Mobility  lifting and carrying objects  walking     Self care  no deficits     Domestic Life  no deficits     Interactions/Relationships  no deficits     Life Areas  no deficits     Community and Social Life  community life  recreation and leisure     Participation Restrictions:   Walking/standing > 5-10 min/bending fwd/stairs       evolving clinical presentation with changing clinical characteristics    moderate            GOALS: Pt is in agreement with the following goals.     Short term goals:  6 weeks or 10 visits   1.  Pt will demonstrate increased lumbar ROM by at least 3 degrees from the initial ROM value with improvements noted in functional ROM and ability to perform ADLs (Met 4/12/2018)  2.  Pt will demonstrate increased maximum isometric torque value by 5% when compared to the initial value resulting in improved ability to perform bending, lifting, and carrying activities safely, confidently.  (Met 4/12/2018)  3.  Patient report a reduction in worst pain score by 1-2 points for improved tolerance during work and recreational activities (MEt 6/11/2018)  4.  Pt able to perform HEP correctly with minimal cueing or supervision for therapist     Long term goals: 13 weeks or 20 visits   1. Pt will demonstrate increased lumbar ROM by at least 6 degrees from initial ROM value, resulting in improved ability to perform functional fwd bending while standing and sitting.   2. Pt will demonstrate increased maximum isometric torque value by 10% when compared to the initial value resulting in improved ability to perform bending, lifting, and carrying activities safely, confidently.  3. Pt to demonstrate ability to  independently control and reduce their pain through posture positioning and mechanical movements throughout a typical day.  4.  Patient will demonstrate improved overall function per FOTO Survey to CK = at least 40% but < 60% impaired, limited or restricted score or less.  5. Pt will be able to ambulate 10 minutes without experiencing LE numbness/weakness   6. Pt will be able to stairclimb with RG without pain      Plan   Continue with established Plan of Care towards established PT goals.   o

## 2018-06-12 ENCOUNTER — OFFICE VISIT (OUTPATIENT)
Dept: SPINE | Facility: CLINIC | Age: 63
End: 2018-06-12
Attending: NEUROLOGICAL SURGERY
Payer: MEDICARE

## 2018-06-12 VITALS
DIASTOLIC BLOOD PRESSURE: 82 MMHG | SYSTOLIC BLOOD PRESSURE: 160 MMHG | HEART RATE: 89 BPM | BODY MASS INDEX: 34.96 KG/M2 | HEIGHT: 62 IN | WEIGHT: 190 LBS

## 2018-06-12 DIAGNOSIS — G89.29 CHRONIC MIDLINE LOW BACK PAIN WITH BILATERAL SCIATICA: Primary | ICD-10-CM

## 2018-06-12 DIAGNOSIS — M54.41 CHRONIC MIDLINE LOW BACK PAIN WITH BILATERAL SCIATICA: Primary | ICD-10-CM

## 2018-06-12 DIAGNOSIS — M48.062 SPINAL STENOSIS OF LUMBAR REGION WITH NEUROGENIC CLAUDICATION: ICD-10-CM

## 2018-06-12 DIAGNOSIS — M43.16 SPONDYLOLISTHESIS AT L4-L5 LEVEL: ICD-10-CM

## 2018-06-12 DIAGNOSIS — M54.42 CHRONIC MIDLINE LOW BACK PAIN WITH BILATERAL SCIATICA: Primary | ICD-10-CM

## 2018-06-12 PROCEDURE — 99999 PR PBB SHADOW E&M-EST. PATIENT-LVL III: CPT | Mod: PBBFAC,,, | Performed by: NEUROLOGICAL SURGERY

## 2018-06-12 PROCEDURE — 99205 OFFICE O/P NEW HI 60 MIN: CPT | Mod: S$PBB,,, | Performed by: NEUROLOGICAL SURGERY

## 2018-06-12 PROCEDURE — 99213 OFFICE O/P EST LOW 20 MIN: CPT | Mod: PBBFAC | Performed by: NEUROLOGICAL SURGERY

## 2018-06-12 NOTE — PROGRESS NOTES
CHIEF COMPLAINT:  Pain across the whole lower back and in the posterior regions of the right leg and posterior regions of the left leg    HPI:  Joanne Lino is a 63 y.o. female who presents for neurosurgical evaluation. She is having pain across the whole lower back and in the anterior and posterior regions of the right leg and anterior and posterior regions of the left leg, as well as anterior and posterior right leg and anterior and posterior left leg paresthesias that started 12 years ago.  The pain came on gradually, and it has been constant ever since.  The patient describes the pain as sharp, stabbing, aching, dull and deep, rated as a 9 on a pain scale of 1-10. She reports there is weakness in the right leg and left leg. The pain gets better with injection, and the pain is worse with bending forward, bending backward and mary work. She tried epidural steroid injections with marked relief. Patient denies accidents or trauma, denies bowel or bladder symptoms, and denies saddle anesthesia.        (Not in a hospital admission)    Review of patient's allergies indicates:   Allergen Reactions    Codeine Nausea Only    Pcn [penicillins] Itching       Past Medical History:   Diagnosis Date    Carpal tunnel syndrome     Depression     Ectopic pregnancy     High cholesterol     Hypertension     Osteoarthritis     PTSD (post-traumatic stress disorder)      Past Surgical History:   Procedure Laterality Date    INJECTION OF FACET JOINT Bilateral 6/8/2018    Procedure: INJECTION, FACET JOINT;  Surgeon: Edwina Williamson MD;  Location: [facility];  Service: Pain Management;  Laterality: Bilateral;  B/L FACET INJS @ L4-5 AND L5-S1  [id number]      Consent Needed    OOPHORECTOMY      OVARIAN CYST REMOVAL       Family History   Problem Relation Age of Onset    Cancer Mother     Heart disease Mother     Heart disease Father     Cancer Daughter      Social History   Substance Use Topics    Smoking status:  Former Smoker     Quit date: 1988    Smokeless tobacco: Never Used    Alcohol use Yes      Comment: Socially        Review of Systems:  Constitutional: no fever or chills, pain well controlled  Eyes: no visual changes  ENT: no nasal congestion or sore throat  Respiratory: no cough or shortness of breath  Cardiovascular: no chest pain or palpitations  Gastrointestinal: no nausea or vomiting, tolerating diet  Genitourinary: no hematuria or dysuria  Integument/Breast: no rash or pruritis  Hematologic/Lymphatic: no easy bruising or lymphadenopathy  Musculoskeletal: no arthralgias or myalgias, positive for back pain  Neurological: no seizures or tremors  Behavioral/Psych: no auditory or visual hallucinations  Endocrine: no heat or cold intolerance    Review of Systems    OBJECTIVE:     Vital Signs (Most Recent)  Pulse: 89 (06/12/18 1044)  BP: (!) 160/82 (06/12/18 1044)    Physical Exam:  Physical Exam:  Nursing note and vitals reviewed.    Constitutional: She appears well-developed and well-nourished.     Eyes: Pupils are equal, round, and reactive to light. Conjunctivae and EOM are normal.     Cardiovascular: Normal rate.     Abdominal: Soft. Bowel sounds are normal.     Skin: Skin displays no rash on extremities. Skin displays no lesions on extremities.     Psych/Behavior: She is alert. She is oriented to person, place, and time. She has a normal mood and affect.     Musculoskeletal: Gait is normal.        Neck: Range of motion is full. Muscle strength is 5/5. Tone is normal.        Back: Range of motion is limited. Muscle strength is 5/5. Tone is normal.        Right Upper Extremities: Range of motion is full. Muscle strength is 5/5. Tone is normal.        Left Upper Extremities: Range of motion is full. Muscle strength is 5/5. Tone is normal.       Right Lower Extremities: Range of motion is full. Muscle strength is 5/5. Tone is normal.        Left Lower Extremities: Range of motion is full. Muscle strength is 5/5.  Tone is normal.     Neurological:        DTRs: DTRs are DTRS NORMAL AND SYMMETRICnormal and symmetric.        Cranial nerves: Cranial nerve(s) II, III, IV, V, VI, VII, VIII, IX, X, XI and XII are intact.       Laboratory  none    Diagnostic Results:  X-Ray: Reviewed  MRI: Reviewed  L-spine: L4-5 Grade I degen spondylolisthesis with severe spinal stenosis. L5-S1 severe DDD and spinal stenosis.     ASSESSMENT/PLAN:     Impressio- symptomatic L4-5 grade I spondy and L5-S1 DDD with spinal stenosis.     Plan- Patient is doing very well after facet injections so no surgery planned now. Follow up with MLP to see how she is doing in 3 months.

## 2018-06-14 ENCOUNTER — CLINICAL SUPPORT (OUTPATIENT)
Dept: REHABILITATION | Facility: OTHER | Age: 63
End: 2018-06-14
Attending: PHYSICAL MEDICINE & REHABILITATION
Payer: MEDICARE

## 2018-06-14 DIAGNOSIS — G89.29 CHRONIC MIDLINE LOW BACK PAIN WITH BILATERAL SCIATICA: ICD-10-CM

## 2018-06-14 DIAGNOSIS — M54.41 CHRONIC MIDLINE LOW BACK PAIN WITH BILATERAL SCIATICA: ICD-10-CM

## 2018-06-14 DIAGNOSIS — M54.42 CHRONIC MIDLINE LOW BACK PAIN WITH BILATERAL SCIATICA: ICD-10-CM

## 2018-06-14 PROCEDURE — 97110 THERAPEUTIC EXERCISES: CPT

## 2018-06-14 NOTE — PATIENT INSTRUCTIONS
Press-Up        Press upper body upward, keeping hips in contact with floor. Keep lower back and buttocks relaxed. Hold 3 seconds.  Repeat 20 times per set. Do 3 sessions per day.     [email or web address]     Copyright © [employer]. All rights reserved.      Curl: Standing        Holy Cross tubing under front foot in stride stance. Palms forward, curl arms.  Repeat 20 times per set. Do 3 sessions per week.     [email or web address]     Copyright © [employer]. All rights reserved.      Press        Face away from anchor in shoulder width stance. Palms down, press arms forward.  Repeat 20 times per set. Do 3 sessions per week.  Holy Cross Height: Shoulder     [email or web address]     Copyright © [employer]. All rights reserved.      Low Row: Thumbs Up        Face anchor, medium to wide stance. Thumbs up, pull arms back, squeezing shoulder blades together.   Repeat 20 times per set. Do 3 sessions per week.  Holy Cross Height: Waist     [email or web address]     Copyright © [employer]. All rights reserved.      Press: Standing        Face anchor in stride stance. Thumbs up, straighten arms, rotating to palms down.  Repeat 20 times per set. Do 3 sessions per week.  [facility] Cross Height: Over Head     [email or web address]     Copyright © [employer]. All rights reserved.

## 2018-06-14 NOTE — PROGRESS NOTES
"[facility]      Name: Joanne Lino  Clinic Number: [id number]  Date of Treatment: 2018   Diagnosis:   Encounter Diagnosis   Name Primary?    Chronic midline low back pain with bilateral sciatica       Physician: Tammi Matute, *    Pain pattern determined: 1PEP (changed after reassessment 4/10/18 per centralization of symptoms with EIL)     Plan of care signed: 2018   Time in: 10:30 am  Time Out:11:30 am  Total Treatment time: 60  Precautions: HTN/OA, Grade 1-2 spondylolisthesis L4-5  Visit #: 20    POC due: 2018    Reassessment done: 4/10/18, 18  Reassessment due: 18 done 18  Next reassessment 18, done 18  Next due 18      Face to Face discussion of patient was done between PT and PTA.     Subjective   Joanne reports is still feeling good from the injections, she has one incidence of her leg going numb but she worked it out and it only lasted for 15 min but she was nervous about it and was hoping that the injection did not last for that short of a time. The  Told the patient that she would eventually need surgery.    Patient reports their pain to be 0-1/10 on a 0-10 scale with 0 being no pain and 10 being the worst pain imaginable.    Pain Location: B LB/Lumbar    Occupation: No  Leisure: walk for exercise/play with [employer]/golfing/travel                     Pts goals:  "Be able to do more without pain , not fall"     Face to Face discussion of patient was done between PT and PTA.     Objective   Baseline IM Testing Results:   Date of testin18  ROM 18-42 deg   Max Peak Torque 84    Min Peak Torque 39    Flex/Ext Ratio 2.15   % below normative data 55     Femur 5      Midpoint IM Testing Results:   Date of testin2018  ROM 33-6   Max Peak Torque 105   Min Peak Torque 55   Flex/Ext Ratio 1.9   % below relative normative data -51   % Change from Initial Evaluation +61     Final IM Testing " Results:   Date of testin2018  ROM 0-51   Max Peak Torque 170   Min Peak Torque 39   Flex/Ext Ratio 4.35:1   % below relative normative data -28   % Change from Initial Evaluation +86       Extension WNL and pain free, flexion tightness at end range     FOTO: Focus on Therapeutic Outcomes   Category: lumbar   % Impaired: 60%  Current Score  = CK = at least 40% but < 60% impaired, limited or restricted  Goal at Discharge Score = CJ = at least 20% but < 40% impaired, limited or restricted    VISIT 5: 62%  VISIT 10: not completed     MOVEMENT LOSS 3/27/18    ROM Loss   Flexion WFL   Extension moderate loss c/ pain   Side bending Right Min loss inc pull   Side bending Left minimal loss inc pull   Rotation Right minimal loss   Rotation Left minimal loss          Treatment    Pt was instructed in and performed the following:     Joanne received therapeutic exercises to develop/improved posture, cardiovascular endurance, muscular endurance, lumbar/cervical ROM, strength and muscular endurance for 40 minutes including the following exercises:      [facility] 2018   Visit Number 20   VAS Pain Rating 1   Recumbent Bike Seat Pos. 12   Time 10   Extension in Lying 10   Extension in Standing -   Flexion in Lying 10   Manual Therapy -   Lumbar Extension Seat Pad -   Femur Restraint -   Top Dead Center -   Counterweight -   Lumbar Flexion 51   Lumbar Extension 0   Lumbar Peak Torque 170   Min Torque 39   Percent From Norm 23   Percent Change from Initial -   Lumbar Weight -   Repetitions -   Rating of Perceived Exertion -   Ice - Z Lie (in min.) -           EIL 10x  LTR 10x  Bridge   PPT  Clamshell      Peripheral muscle strengthening which included 1 set of 15-20 repetitions at a slow, controlled 7 second per rep pace focused on strengthening supporting musculature for improved body mechanics and functional mobility.  Pt and therapist focused on proper form during treatment to ensure optimal strengthening  of each targeted muscle group.  Machines were utilized including torso rotation, leg extension, leg curl, chest press, upright row. Tricep extension, bicep curl. Leg press, and hip abduction performed without difficulty today.       Joanne received the following manual therapy techniques: Not today-cupping to lower back, reviewed precautions again pt may purchase on [employer]      Home Exercise Program as follows:   Flexion in lying with theraball 10x, 3x/day   PPT supine, seated, and standing 10x, 3x/day  Sidelying clamshells 15x  LTR 10x   Bridging 10x  EIL 10x (Defer at this time due to pain with trail 6/4/18)      Handouts were given to the patient. Pt demo good understanding of the education provided. Joanne demonstrated good return demonstration of activities.     Lumbar roll use compliance: compliant with LR, ice pack, and theraball  Additional exercises taught this treatment session:   None     Assessment   Patient has attended 20 visits of the [facility] program for aerobic work, med ex isometric testing with dynamic strengthening on med ex equipment for spine, whole body strengthening on med ex equipment, HEP, education.  Patient has completed Healthy Back Program and is ready to be transitioned into wellness program.  Importance of wellness program, and attending 2/month to maintain strength stressed.  Importance of continuing there ex and body mech and ergonomics stressed.   Patient demonstrates improvement in ability to reduce symptoms, improved posture, improved ROM and improved strength.   Reviewed HEP, and importance of maintaining a healthy spine through continued stretching and performance of HEP, good posture, good ergonomics, good body mech with ADL, leisure, and work.  Discharge handout with HEP given, and discussed aspects of exercise program, cardio, strengthening, and stretching.    Patient expressed understanding information given.  Patient is unsure if she will attend wellness, she has been  given exercise for home for total body strengthening.     Pt's spiritual, cultural and educational needs considered and pt agreeable to plan of care and goals as stated below:     Medical necessity is demonstrated by the following problem list.    Pt presents with the following impairments:   History  Co-morbidities and personal factors that may impact the plan of care Examination  Body Structures and Functions, activity limitations and participation restrictions that may impact the plan of care Clinical Presentation    Decision Making/ Complexity Score   Co-morbidities:   OA/HTN     Personal Factors:   no deficits Body Regions:   back  lower extremities     Body Systems:   gross symmetry  ROM  strength  gross coordinated movement  gait  transfers  transitions  motor control     Activity limitations:   Learning and applying knowledge  no deficits     General Tasks and Commands  no deficits     Communication  no deficits     Mobility  lifting and carrying objects  walking     Self care  no deficits     Domestic Life  no deficits     Interactions/Relationships  no deficits     Life Areas  no deficits     Community and Social Life  community life  recreation and leisure     Participation Restrictions:   Walking/standing > 5-10 min/bending fwd/stairs       evolving clinical presentation with changing clinical characteristics    moderate            GOALS: Pt is in agreement with the following goals.     Short term goals:  6 weeks or 10 visits   1.  Pt will demonstrate increased lumbar ROM by at least 3 degrees from the initial ROM value with improvements noted in functional ROM and ability to perform ADLs (Met 4/12/2018)  2.  Pt will demonstrate increased maximum isometric torque value by 5% when compared to the initial value resulting in improved ability to perform bending, lifting, and carrying activities safely, confidently.  (Met 4/12/2018)  3.  Patient report a reduction in worst pain score by 1-2 points for  improved tolerance during work and recreational activities (MEt 6/11/2018)  4.  Pt able to perform HEP correctly with minimal cueing or supervision for therapist (Met 6/14/2018)     Long term goals: 13 weeks or 20 visits   1. Pt will demonstrate increased lumbar ROM by at least 6 degrees from initial ROM value, resulting in improved ability to perform functional fwd bending while standing and sitting. (Met 6/14/2018)  2. Pt will demonstrate increased maximum isometric torque value by 10% when compared to the initial value resulting in improved ability to perform bending, lifting, and carrying activities safely, confidently. (Met 6/14/2018)  3. Pt to demonstrate ability to independently control and reduce their pain through posture positioning and mechanical movements throughout a typical day. (Met 6/14/2018)  4.  Patient will demonstrate improved overall function per FOTO Survey to CK = at least 40% but < 60% impaired, limited or restricted score or less.  5. Pt will be able to ambulate 10 minutes without experiencing LE numbness/weakness  (Met 6/14/2018)  6. Pt will be able to stairclimb with RG without pain (Met 6/14/2018)      Plan   D/C with HEP and to wellness

## 2018-06-20 RX ORDER — ETODOLAC 200 MG/1
CAPSULE ORAL
Qty: 90 CAPSULE | Refills: 0 | Status: SHIPPED | OUTPATIENT
Start: 2018-06-20 | End: 2018-07-26 | Stop reason: SDUPTHER

## 2018-06-20 RX ORDER — GABAPENTIN 100 MG/1
CAPSULE ORAL
Qty: 180 CAPSULE | Refills: 0 | Status: SHIPPED | OUTPATIENT
Start: 2018-06-20 | End: 2018-08-06

## 2018-06-25 RX ORDER — GABAPENTIN 100 MG/1
CAPSULE ORAL
Qty: 180 CAPSULE | Refills: 0 | Status: CANCELLED | OUTPATIENT
Start: 2018-06-25

## 2018-06-25 NOTE — TELEPHONE ENCOUNTER
Called spoke with patient she stated that she is still having numbness and pain in bilateral leg she is going back to [place] from July 2-8th and is requesting another letter written for wheelchair and is requesting an script refill for gabapentin and would also like to know what is the next step if nothing is working  ----- Message from Sheron Dueñas sent at 6/25/2018  1:06 PM CDT -----  Contact: pt            Name of Who is Calling: SOPHIA BROWN [[id number]]      What is the request in detail: pt needs a letter in regards to going on vacation.. Pt states she has numbness in her legs and issues with medication.. Please advise      Can the clinic reply by [facility]: no      What Number to [facility] Back [facility] [phone]

## 2018-06-25 NOTE — TELEPHONE ENCOUNTER
We discussed options.  She has seen Dr. Gore but was feeling good after the injection.  She will eventually need surgery.  She feels like she got 100% relief for 10 days.  We did discuss RFA, as an option and going to talk to Dr. Williamson, she wants to discuss it at visit on Monday.  Meds were refilled.  A note for wheelchair for jimi will be written at visit

## 2018-06-28 NOTE — PROGRESS NOTES
Subjective:      Patient ID: Joanne Lino is a 63 y.o. female.    Chief Complaint: Follow-up    Referred by: No ref. provider found     Ms Lino is a 64 yo female here for follow up of her low back and right leg pain that started on 2/1/2018 when she got up after sitting and fell to the ground with leg numbness.  She went to the ER on 2/2/2018 and was given a toradol shot nsaid and muscle relaxer.   She has had episodes of back and leg tingling off and on for the past 10 years, but she has never fallen.  She was seen by me on 2/5/2018 and then on 2/15/2018 we did a right iliolumbar injection with 100% relief for 2-3 days and she has been to 2 PT sessions.  She was then seen by me on 3/8/2018 and she was having more numbness and could not stand and walk too far.  we ordered an MRI and sent her for bilateral L5 TF NATALIE done on 3/22/2018.  She felt like the injection helped about 85% for about 2 weeks.  She was then seen by me on 4/16/2018 and she was continuing in PT and we ordered a repeat injection.  Bilateral L5 TF NATALIE done 4/20/2018 was not as helpful. She was last seen 5/29/2018 and we sent ehr to a surgeon, but also tried Bilateral L3-4, L4-5, L5-S1 facet injections done 6/8/2018  which gave her 100% relief for 10 days.  She saw Dr. Gore when injections were working, he does feel she will eventually need surgery. The injection was great but wore off.  The pain is in the back with numbness and tingling in the legs with walking.  She does not have it all the time.  The numbness is not all the time, it is better than before.  She is not using the walker today    X-ray lumbar 2/2/2018  Findings:   No fracture identified.  Grade 1-2 spondylolisthesis L4-5 with disc space narrowing.  Facet arthropathy L4-5 and L5-S1.  Degenerative disc changes vacuum phenomenon multilevel sparing only the L3-4 disc space.      Impression      1.  Multilevel degenerative disc and facet changes.     MRI lumbar 3/2018  There are  several hemangiomas the bodies of lumbar vertebra.  Paraspinal soft tissues are unremarkable.  The cord ends at L1.    At T12-L1 normal disc, central canal, foramina and facets.    At L1-2 moderate loss of disc height with disc desiccation.  Concentric herniation of disc, anteriorly a disc spur complex.  Posteriorly a broad-based contained extrusion 3.5 mm thickness, no significant central canal or foraminal stenosis.  Facets are unremarkable.    At L2-3 moderate loss of disc height with disc desiccation.  A large herniation posteriorly, a contained protrusion 6.5 mm thickness, is demonstrated and there is ligamentous hypertrophy.  The central canal AP diameter is 8 mm.  There is bilateral foraminal perineural fat.  Facet degeneration right side with hypertrophy.    At L3-4 normal disc height and signal.  Broad-base contained extrusion of disc, 4 mm thickness, and ligamentous hypertrophy posteriorly.  AP central canal diameter 9.5 mm.  No significant foraminal stenosis with perineural fat noted bilaterally.  Facet degeneration hypertrophy right side.    At L4-5 normal disc height with partial desiccation.  Grade 1 spondylolisthesis with severe facet and ligamentous degeneration and hypertrophy.  AP central canal diameter 8.2 mm.  50% loss of perineural fat right foramen, adequate perineural fat left foramen.    At L5-S1 loss of normal disc height with disc desiccation.  The herniation is broad-based, a contained protrusion 3.3 mm thick, and with ligamentous and facet hypertrophy.  AP central canal diameter 11 mm.  Loss of perineural fat in the foramina, bilaterally.  Facet degeneration.    Normal sacroiliac joints.  Impression       1. Multilevel discogenic and spondylitic disease with varying degrees of central canal and/or foraminal stenoses as described.  2. Grade 1 spondylolisthesis L4-5.  Spondylolysis not identified but there is severe facet degeneration at this level especially.  3. Multilevel facet  osteoarthropathy.      Past Medical History:  No date: Carpal tunnel syndrome  No date: Depression  No date: Ectopic pregnancy  No date: High cholesterol  No date: Hypertension  No date: Osteoarthritis  No date: PTSD (post-traumatic stress disorder)    Past Surgical History:  No date: OOPHORECTOMY    Review of patient's family history indicates:    Cancer                         Mother                    Heart disease                  Mother                    Heart disease                  Father                    Cancer                         Daughter                    Social History    Marital status:              Spouse name:                       Years of education:                 Number of children:               Social History Main Topics    Smoking status: Never Smoker                                                                Smokeless tobacco: Never Used                        Alcohol use: Yes                Comment: Socially    Drug use: No                Current Outpatient Prescriptions:  b complex vitamins tablet, Take 1 tablet by mouth once daily., Disp: , Rfl:    CALCIUM CARBONATE (CALCIUM 500 ORAL), Take by mouth., Disp: , Rfl:   clonazePAM (KLONOPIN) 0.5 MG tablet, Take 0.25 mg by mouth 2 (two) times daily., Disp: , Rfl:   cyclobenzaprine (FLEXERIL) 10 MG tablet, Take 0.5-1 tablets (5-10 mg total) by mouth 3 (three) times daily as needed for Muscle spasms., Disp: 90 tablet, Rfl: 0  etodolac (LODINE) 200 MG Cap, Take 1 capsule (200 mg total) by mouth 3 (three) times daily., Disp: 90 capsule, Rfl: 1  multivitamin capsule, Take 1 capsule by mouth once daily., Disp: , Rfl:   simvastatin (ZOCOR) 40 MG tablet, Take 40 mg by mouth once daily. , Disp: , Rfl:   telmisartan (MICARDIS) 80 MG Tab, Take by mouth once daily. , Disp: , Rfl:   traMADol (ULTRAM) 50 mg tablet, Take by mouth 2 (two) times daily as needed. , Disp: , Rfl:   traZODone (DESYREL) 100 MG tablet, Take 200 mg by mouth every  evening., Disp: , Rfl:   vitamin D 1000 units Tab, Take 1,000 Units by mouth once daily., Disp: , Rfl:     No current facility-administered medications for this visit.       Review of patient's allergies indicates:   -- Codeine -- Nausea Only   -- Pcn (penicillins) -- Itching            Review of Systems   Constitution: Negative for weight gain and weight loss.   Cardiovascular: Negative for chest pain.   Respiratory: Negative for shortness of breath.    Musculoskeletal: Positive for back pain. Negative for joint pain and joint swelling.   Gastrointestinal: Negative for abdominal pain and bowel incontinence.   Genitourinary: Negative for bladder incontinence.   Neurological: Positive for paresthesias (bilateral legs). Negative for numbness.           Objective:          General    Vitals reviewed.  Constitutional: She is oriented to person, place, and time. She appears well-developed and well-nourished.   HENT:   Head: Normocephalic and atraumatic.   Pulmonary/Chest: Effort normal.   Neurological: She is alert and oriented to person, place, and time.   Psychiatric: She has a normal mood and affect. Her behavior is normal. Judgment and thought content normal.     General Musculoskeletal Exam   Gait: normal (slow)     Right Ankle/Foot Exam     Tests   Heel Walk: able to perform  Tiptoe Walk: able to perform    Left Ankle/Foot Exam     Tests   Heel Walk: able to perform  Tiptoe Walk: able to perform  Back (L-Spine & T-Spine) / Neck (C-Spine) Exam     Tenderness Right paramedian tenderness of the Sacrum and Lower L-Spine. Left paramedian tenderness of the Sacrum and Lower L-Spine.     Back (L-Spine & T-Spine) Range of Motion   Extension: 20   Flexion: 90   Lateral Bend Right: 20   Lateral Bend Left: 20   Rotation Right: 40   Rotation Left: 40     Spinal Sensation   Right Side Sensation  C-Spine Level: normal   L-Spine Level: normal  S-Spine Level: normal  Left Side Sensation  C-Spine Level: normal  L-Spine Level:  normal  S-Spine Level: normal    Back (L-Spine & T-Spine) Tests   Right Side Tests  Straight leg raise:      Sitting SLR: > 70 degrees      Left Side Tests  Straight leg raise:     Sitting SLR: > 70 degrees          Other She has no scoliosis .  Spinal Kyphosis:  Absent      Muscle Strength   Right Upper Extremity   Biceps: 5/5/5   Deltoid:  5/5  Triceps:  5/5  Wrist Extension: 5/5/5   Finger Flexors:  5/5  Left Upper Extremity  Biceps: 5/5/5   Deltoid:  5/5  Triceps:  5/5  Wrist Extension: 5/5/5   Finger Flexors:  5/5  Right Lower Extremity   Hip Flexion: 5/5   Quadriceps:  5/5   Anterior tibial:  5/5/5  EHL:  5/5  Left Lower Extremity   Hip Flexion: 5/5   Quadriceps:  5/5   Anterior tibial:  5/5/5   EHL:  5/5    Reflexes     Left Side  Biceps:  2+  Triceps:  2+  Brachioradialis:  2+  Quadriceps:  2+  Achilles:  2+  Left Marks's Sign:  Absent  Babinski Sign:  absent    Right Side   Biceps:  2+  Triceps:  2+  Brachioradialis:  2+  Quadriceps:  2+  Achilles:  2+  Right Marks's Sign:  absent  Babinski Sign:  absent    Vascular Exam     Right Pulses        Carotid:                  2+    Left Pulses        Carotid:                  2+        Assessment:       Encounter Diagnoses   Name Primary?    Chronic midline low back pain with bilateral sciatica Yes    Spondylolisthesis at L4-L5 level     Spinal stenosis of lumbar region with neurogenic claudication     DDD (degenerative disc disease), lumbar     Neurogenic claudication          Plan:       Joanne was seen today for follow-up.    Diagnoses and all orders for this visit:    Chronic midline low back pain with bilateral sciatica  -     Ambulatory consult to Pain Clinic    Spondylolisthesis at L4-L5 level  -     Ambulatory consult to Pain Clinic    Spinal stenosis of lumbar region with neurogenic claudication  -     Ambulatory consult to Pain Clinic    DDD (degenerative disc disease), lumbar  -     Ambulatory consult to Pain Clinic    Neurogenic  claudication  -     Ambulatory consult to Pain Clinic           1.  She cannot continue PT due to cost, she is trying to exercise on her own  2.  Etodolac 200mg po TID  3. Cyclobenzaprine as needed  4. She talked to Dr. Gore, she is considering her options for surgery as an option   5.   Gabapentin 100-300 mg TID  6.  Repeat L5 bilateral TF NATALIE with pain  Management ( done on 3/22 gave her 85% relief for 2 weeks) the repeat injection did not help.  Bilateral L3-4, L4-5, L5-S1 facet injections gave her 100% relief for 10 days, and still better than before the injections.  We will send her to Dr. Williamson to consider other injection options, RFA  7. Rolling walker as needed, she is not using today  8.  A letter given for wheelchair in [place] next week  9.  RTC 8 weeks

## 2018-06-29 ENCOUNTER — OFFICE VISIT (OUTPATIENT)
Dept: SPINE | Facility: CLINIC | Age: 63
End: 2018-06-29
Attending: PHYSICAL MEDICINE & REHABILITATION
Payer: MEDICARE

## 2018-06-29 VITALS
HEART RATE: 88 BPM | BODY MASS INDEX: 40.16 KG/M2 | WEIGHT: 218.25 LBS | SYSTOLIC BLOOD PRESSURE: 122 MMHG | HEIGHT: 62 IN | DIASTOLIC BLOOD PRESSURE: 62 MMHG

## 2018-06-29 DIAGNOSIS — M48.062 SPINAL STENOSIS OF LUMBAR REGION WITH NEUROGENIC CLAUDICATION: ICD-10-CM

## 2018-06-29 DIAGNOSIS — M43.16 SPONDYLOLISTHESIS AT L4-L5 LEVEL: ICD-10-CM

## 2018-06-29 DIAGNOSIS — G89.29 CHRONIC MIDLINE LOW BACK PAIN WITH BILATERAL SCIATICA: Primary | ICD-10-CM

## 2018-06-29 DIAGNOSIS — M51.36 DDD (DEGENERATIVE DISC DISEASE), LUMBAR: ICD-10-CM

## 2018-06-29 DIAGNOSIS — M54.41 CHRONIC MIDLINE LOW BACK PAIN WITH BILATERAL SCIATICA: Primary | ICD-10-CM

## 2018-06-29 DIAGNOSIS — M54.42 CHRONIC MIDLINE LOW BACK PAIN WITH BILATERAL SCIATICA: Primary | ICD-10-CM

## 2018-06-29 DIAGNOSIS — R29.818 NEUROGENIC CLAUDICATION: ICD-10-CM

## 2018-06-29 PROCEDURE — 99214 OFFICE O/P EST MOD 30 MIN: CPT | Mod: S$PBB,,, | Performed by: PHYSICAL MEDICINE & REHABILITATION

## 2018-06-29 PROCEDURE — 99999 PR PBB SHADOW E&M-EST. PATIENT-LVL III: CPT | Mod: PBBFAC,,, | Performed by: PHYSICAL MEDICINE & REHABILITATION

## 2018-06-29 PROCEDURE — 99213 OFFICE O/P EST LOW 20 MIN: CPT | Mod: PBBFAC | Performed by: PHYSICAL MEDICINE & REHABILITATION

## 2018-06-29 NOTE — LETTER
June 29, 2018    Joanne Lino  [address] [place] [address]         [facility]  [address], [address]  [facility] [facility] [address]  Phone: [phone]  Fax: [phone] June 29, 2018     Patient: Joanne Lino   YOB: 1955   Date of Visit: 6/29/2018     To whom it concerns:    It is my medical opinion that Joanne Lino needs a wheelchair for walking long distance, due to back pain and spinal stenosis and trouble with legs getting numb walking too far.     If you have any questions or concerns, please don't hesitate to call.     Sincerely,      Tammi Matute MD

## 2018-07-18 ENCOUNTER — PATIENT MESSAGE (OUTPATIENT)
Dept: SPINE | Facility: CLINIC | Age: 63
End: 2018-07-18

## 2018-07-26 ENCOUNTER — OFFICE VISIT (OUTPATIENT)
Dept: PAIN MEDICINE | Facility: CLINIC | Age: 63
End: 2018-07-26
Attending: ANESTHESIOLOGY
Payer: MEDICARE

## 2018-07-26 ENCOUNTER — PATIENT MESSAGE (OUTPATIENT)
Dept: SPINE | Facility: CLINIC | Age: 63
End: 2018-07-26

## 2018-07-26 VITALS
WEIGHT: 227.06 LBS | DIASTOLIC BLOOD PRESSURE: 83 MMHG | HEIGHT: 62 IN | SYSTOLIC BLOOD PRESSURE: 128 MMHG | TEMPERATURE: 99 F | RESPIRATION RATE: 18 BRPM | BODY MASS INDEX: 41.78 KG/M2 | HEART RATE: 103 BPM

## 2018-07-26 DIAGNOSIS — M54.42 CHRONIC MIDLINE LOW BACK PAIN WITH BILATERAL SCIATICA: ICD-10-CM

## 2018-07-26 DIAGNOSIS — M48.062 SPINAL STENOSIS OF LUMBAR REGION WITH NEUROGENIC CLAUDICATION: ICD-10-CM

## 2018-07-26 DIAGNOSIS — M54.41 CHRONIC MIDLINE LOW BACK PAIN WITH BILATERAL SCIATICA: ICD-10-CM

## 2018-07-26 DIAGNOSIS — M43.16 SPONDYLOLISTHESIS AT L4-L5 LEVEL: ICD-10-CM

## 2018-07-26 DIAGNOSIS — G89.4 CHRONIC PAIN SYNDROME: ICD-10-CM

## 2018-07-26 DIAGNOSIS — M48.062 SPINAL STENOSIS OF LUMBAR REGION WITH NEUROGENIC CLAUDICATION: Primary | ICD-10-CM

## 2018-07-26 DIAGNOSIS — M47.816 LUMBAR SPONDYLOSIS: Primary | ICD-10-CM

## 2018-07-26 DIAGNOSIS — G89.29 CHRONIC MIDLINE LOW BACK PAIN WITH BILATERAL SCIATICA: ICD-10-CM

## 2018-07-26 PROCEDURE — 99213 OFFICE O/P EST LOW 20 MIN: CPT | Mod: PBBFAC | Performed by: ANESTHESIOLOGY

## 2018-07-26 PROCEDURE — 99214 OFFICE O/P EST MOD 30 MIN: CPT | Mod: S$PBB,GC,, | Performed by: ANESTHESIOLOGY

## 2018-07-26 PROCEDURE — 99999 PR PBB SHADOW E&M-EST. PATIENT-LVL III: CPT | Mod: PBBFAC,,, | Performed by: ANESTHESIOLOGY

## 2018-07-26 RX ORDER — ETODOLAC 200 MG/1
CAPSULE ORAL
Qty: 90 CAPSULE | Refills: 0 | Status: SHIPPED | OUTPATIENT
Start: 2018-07-26 | End: 2019-01-21

## 2018-07-26 NOTE — PROGRESS NOTES
Subjective:     Patient ID: Joanne Lino is a 63 y.o. female.    Chief Complaint: Pain    Consulted by: Tammi Matute    Disclaimer: This note was generated using voice recognition software.  There may be a typographical errors that were missed during proofreading.      HPI:    Joanne Lino is a 63 y.o. female who presents today with low back pain and leg numbness. The leg numbness is intermittent and only elicited after walking for long periods.  She has previously seen Dr. Matute in the back and spine center and has head several pain interventions performed (see below).  .   This pain is described as constant in the lower back, intermittently including the legs bilaterally.  This takes the form of numbness and cramping.  Alleviating factors are muscle relaxants.  Exacerbating factors are standing and walking for prolonged periods.  Pain is scaled as 7/10 with associated symptoms including intermittent numbness (once in previous month).    Physical Therapy: HEP nearly every day.  Completed physical therapy 4 weeks ago.      Non-pharmacologic Treatment:     · Ice/Heat: Yes - minimal relief  · TENS: None  · Massage: None  · Chiropractic care: None  · Acupuncture: None  · Other: None         Pain Medications:         · Currently taking: etodolac 200 mg tid, gabapentin 200 mg, cymbalta 30 mg    · Has tried in the past:  Cyclobenzaprine uses prn, helps with muscle spasms, tylenol    · Has not tried:  anticonvulsants, topical creams    Blood thinners: None    Interventional Therapies:   Facet joints L4-5, L5-S1 06.08.18  TFESI bilateral L5-S1 04/20/18  TFESI bilateral L5-S1L5    Relevant Surgeries: none    Affecting sleep? 3-4 hrs    Affecting daily activities? Yes    Depressive symptoms? No          · SI/HI? No    Work status: No    Prescription Monitoring Program database:  Reviewed and consistent with medication use as prescribed.    Pain Scales  Best: 2/10  Worst: 9/10  Usually: 5/10  Today: 5/10        Last  3 PDI Scores 7/26/2018   Pain Disability Index (PDI) 45       Review of Systems   Constitution: Positive for weight gain. Negative for fever and weakness.   Cardiovascular: Negative for chest pain and claudication.   Respiratory: Negative for sleep disturbances due to breathing and snoring.    Endocrine: Negative for polydipsia, polyphagia and polyuria.   Hematologic/Lymphatic: Negative for adenopathy and bleeding problem.   Skin: Negative for skin cancer, suspicious lesions and unusual hair distribution.   Musculoskeletal: Positive for back pain, falls, joint pain, muscle cramps, muscle weakness and myalgias.   Gastrointestinal: Negative for bloating, abdominal pain and anorexia.   Genitourinary: Negative for bladder incontinence and pelvic pain.   Neurological: Negative for focal weakness, headaches, seizures and sensory change.   Psychiatric/Behavioral: Negative for altered mental status and suicidal ideas.             Past Medical History:   Diagnosis Date    Carpal tunnel syndrome     Depression     Ectopic pregnancy     High cholesterol     Hypertension     Osteoarthritis     PTSD (post-traumatic stress disorder)        Past Surgical History:   Procedure Laterality Date    INJECTION OF FACET JOINT Bilateral 6/8/2018    Procedure: INJECTION, FACET JOINT;  Surgeon: Edwina Williamson MD;  Location: [facility];  Service: Pain Management;  Laterality: Bilateral;  B/L FACET INJS @ L4-5 AND L5-S1  [id number]      Consent Needed    OOPHORECTOMY      OVARIAN CYST REMOVAL         Review of patient's allergies indicates:   Allergen Reactions    Codeine Nausea Only    Pcn [penicillins] Itching       Current Outpatient Prescriptions   Medication Sig Dispense Refill    b complex vitamins tablet Take 1 tablet by mouth once daily.      CALCIUM CARBONATE (CALCIUM 500 ORAL) Take by mouth.      clonazePAM (KLONOPIN) 0.5 MG tablet Take 0.25 mg by mouth 2 (two) times daily.      cyclobenzaprine (FLEXERIL) 10 MG  "tablet Take 0.5-1 tablets (5-10 mg total) by mouth 3 (three) times daily as needed for Muscle spasms. 90 tablet 1    DULoxetine (CYMBALTA) 30 MG capsule Take 30 mg by mouth once daily.      etodolac (LODINE) 200 MG Cap TAKE 1 CAPSULE(200 MG) BY MOUTH THREE TIMES DAILY 90 capsule 0    gabapentin (NEURONTIN) 100 MG capsule TAKE 1 TO 2 CAPSULES(100  MG) BY MOUTH THREE TIMES DAILY 180 capsule 0    multivitamin capsule Take 1 capsule by mouth once daily.      simvastatin (ZOCOR) 40 MG tablet Take 40 mg by mouth once daily.       telmisartan (MICARDIS) 80 MG Tab Take by mouth once daily.       traZODone (DESYREL) 100 MG tablet Take 200 mg by mouth every evening.      vitamin D 1000 units Tab Take 1,000 Units by mouth once daily.       No current facility-administered medications for this visit.        Family History   Problem Relation Age of Onset    Cancer Mother     Heart disease Mother     Heart disease Father     Cancer Daughter        Social History     Social History    Marital status:      Spouse name: N/A    Number of children: N/A    Years of education: N/A     Occupational History    Not on file.     Social History Main Topics    Smoking status: Former Smoker     Quit date: 1988    Smokeless tobacco: Never Used    Alcohol use Yes      Comment: Socially    Drug use: No    Sexual activity: Not on file     Other Topics Concern    Not on file     Social History Narrative    No narrative on file       Objective:     Vitals:    07/26/18 1409   BP: 128/83   Pulse: 103   Resp: 18   Temp: 98.5 °F (36.9 °C)   TempSrc: Oral   Weight: 103 kg (227 lb 1.2 oz)   Height: 5' 2" (1.575 m)   PainSc:   5        .GEN:  Well developed, well nourished.  No acute distress.   HEENT:  No trauma.  Mucous membranes moist.  Nares patent bilaterally.  PSYCH: Normal affect. Thought content appropriate.  CHEST:  Breathing symmetric.  No audible wheezing.  ABD: Soft, non-distended.  SKIN:  Warm, pink, dry.  " No rash on exposed areas.    EXT:  No cyanosis, clubbing, or edema.  No color change or changes in nail or hair growth.  NEURO/MUSCULOSKELETAL:  Fully alert, oriented, and appropriate. Speech normal paramjit. No cranial nerve deficits.   Gait: Uses walker.  Negative trendelenburg sign bilaterally.   Motor Strength: 5/5 motor strength throughout lower extremities.   Sensory: Negative sensory deficit in the lower extremities.   Reflexes:  2+ and symmetric throughout.  Downgoing Babinski's bilaterally.  No clonus or spasticity.  L-Spine:  Full ROM with pain on extension and rotation. + facet loading bilaterally.  Negative SLR bilaterally.  Negative femoral stretch bilaterally.   SI Joint/Hip: + KALE on left only.  Negative FADIR bilaterally.  + TTP over lumbar paraspinals.  No point tenderness over bilateral SI joints, hips, piriformis muscles, or GTB.        Imaging:      MRI Lumbar Spine Without Contrast   Order: [id number]   Status:  Final result   Visible to patient:  Yes (Patient Portal) Next appt:  09/11/2018 at 10:00 AM in [facility] (Naomy Balderas PA-C) Dx:  Spinal enthesopathy; Neurogenic joseph...   Details     Reading Physician Reading Date Result Priority   Miguel Lala II, MD 3/13/2018    Narrative     EXAMINATION:  MRI LUMBAR SPINE WITHOUT CONTRAST    CLINICAL HISTORY:  Low back pain, >6wks conservative tx, persistent-progressive sx, surgical candidate; Lumbago with sciatica, right side    TECHNIQUE:  Multiplanar, multisequence MR images were acquired from the thoracolumbar junction to the sacrum without the administration of contrast.    COMPARISON:  Lumbar spine radiograph February 2, 2018    FINDINGS:  There are several hemangiomas the bodies of lumbar vertebra.  Paraspinal soft tissues are unremarkable.  The cord ends at L1.    At T12-L1 normal disc, central canal, foramina and facets.    At L1-2 moderate loss of disc height with disc desiccation.  Concentric herniation of disc, anteriorly a disc  spur complex.  Posteriorly a broad-based contained extrusion 3.5 mm thickness, no significant central canal or foraminal stenosis.  Facets are unremarkable.    At L2-3 moderate loss of disc height with disc desiccation.  A large herniation posteriorly, a contained protrusion 6.5 mm thickness, is demonstrated and there is ligamentous hypertrophy.  The central canal AP diameter is 8 mm.  There is bilateral foraminal perineural fat.  Facet degeneration right side with hypertrophy.    At L3-4 normal disc height and signal.  Broad-base contained extrusion of disc, 4 mm thickness, and ligamentous hypertrophy posteriorly.  AP central canal diameter 9.5 mm.  No significant foraminal stenosis with perineural fat noted bilaterally.  Facet degeneration hypertrophy right side.    At L4-5 normal disc height with partial desiccation.  Grade 1 spondylolisthesis with severe facet and ligamentous degeneration and hypertrophy.  AP central canal diameter 8.2 mm.  50% loss of perineural fat right foramen, adequate perineural fat left foramen.    At L5-S1 loss of normal disc height with disc desiccation.  The herniation is broad-based, a contained protrusion 3.3 mm thick, and with ligamentous and facet hypertrophy.  AP central canal diameter 11 mm.  Loss of perineural fat in the foramina, bilaterally.  Facet degeneration.    Normal sacroiliac joints.      Impression       1. Multilevel discogenic and spondylitic disease with varying degrees of central canal and/or foraminal stenoses as described.  2. Grade 1 spondylolisthesis L4-5.  Spondylolysis not identified but there is severe facet degeneration at this level especially.  3. Multilevel facet osteoarthropathy.      Electronically signed by: Miguel Lala II, MD  Date: 03/13/2018  Time: 10:48               Assessment:     Encounter Diagnoses   Name Primary?    Lumbar spondylosis Yes    Spinal stenosis of lumbar region with neurogenic claudication     Chronic pain syndrome      Spondylolisthesis at L4-L5 level        Plan:     Diagnoses and all orders for this visit:    Lumbar spondylosis    Spinal stenosis of lumbar region with neurogenic claudication    Chronic pain syndrome    Spondylolisthesis at L4-L5 level         Low back and leg pain pain is consistent with the above.    We discussed the assessment and recommendations.  All available images were reviewed. We discussed the disease process, prognosis, treatment plan, and risks and benefits. The patient is aware of the risks and benefits of the medications being prescribed, common side effects, and proper usage. The following is the plan we agreed on:     1. Schedule for medial branch block L2-5.    1. If positive, RFA  2. If negative, refer back to Dr. Gore for surgical options  3. I discussed that during the test, I wanted her to see not only how much pain relief she got, but also if she is able to increase function.  Both can help us decide the next step.  2. Continue HEP; discussed importance of continued activity.  3. Discussed s/s of cauda equina syndrome and gave instruction to present to [facility] ED should these occur.  4. RTC in 3-6 weeks or sooner if needed.     The above plan and management options were discussed at length with patient. Patient is in agreement with the above and verbalized understanding. It will be communicated with the referring physician via electronic record, fax, or mail.    Sunil Gupta MD  [facility] Pain Medicine  [phone]  PGY-V    Thank you for allowing me to participate in the care of this patient.   Please do not hesitate to call me at [phone] with any questions or concerns.    Greater than 25 minutes spent in total in todays visit with the patient, with more than half that time direct face to face counseling and education with the patient today. We discussed the disease process, prognosis, treatment plan, and risks and benefits.    I have seen the patient with the fellow physician.  I  have performed my own history and physical exam and we have come up with the above plan.  The patient is in agreement with our plan.    Edwina Williamson MD  7/26/2018

## 2018-07-26 NOTE — LETTER
July 27, 2018      Tammi Matute MD  [address]  [facility] [address]  [facility]  [facility] [address]           [facility]  [address]  [facility] [address]  Phone: [phone]  Fax: [phone]          Patient: Joanne Lino   MR Number: [id number]   YOB: 1955   Date of Visit: 7/26/2018       Dear Dr. Tammi Matute:    Thank you for referring Joanne Lino to me for evaluation. Attached you will find relevant portions of my assessment and plan of care.    If you have questions, please do not hesitate to call me. I look forward to following Joanne Lino along with you.    Sincerely,    Edwina Williamson MD    Enclosure  CC:  No Recipients    If you would like to receive this communication electronically, please contact [email or web address] or [phone] to request more information on [employer] Direct Link access.    For providers and/or their staff who would like to refer a patient to [facility], please contact us through our one-stop-shop provider referral line, [facility], at [phone].    If you feel you have received this communication in error or would no longer like to receive these types of communications, please e-mail [email or web address]

## 2018-07-27 ENCOUNTER — SURGERY (OUTPATIENT)
Age: 63
End: 2018-07-27

## 2018-07-27 ENCOUNTER — HOSPITAL ENCOUNTER (OUTPATIENT)
Facility: OTHER | Age: 63
Discharge: HOME | End: 2018-07-27
Attending: ANESTHESIOLOGY | Admitting: ANESTHESIOLOGY
Payer: MEDICARE

## 2018-07-27 VITALS
OXYGEN SATURATION: 95 % | SYSTOLIC BLOOD PRESSURE: 125 MMHG | WEIGHT: 227 LBS | TEMPERATURE: 99 F | BODY MASS INDEX: 41.77 KG/M2 | HEART RATE: 97 BPM | DIASTOLIC BLOOD PRESSURE: 58 MMHG | HEIGHT: 62 IN | RESPIRATION RATE: 18 BRPM

## 2018-07-27 DIAGNOSIS — M47.816 LUMBAR SPONDYLOSIS: Primary | ICD-10-CM

## 2018-07-27 PROCEDURE — 64494 INJ PARAVERT F JNT L/S 2 LEV: CPT | Mod: 50,,, | Performed by: ANESTHESIOLOGY

## 2018-07-27 PROCEDURE — 64493 INJ PARAVERT F JNT L/S 1 LEV: CPT | Mod: 50 | Performed by: ANESTHESIOLOGY

## 2018-07-27 PROCEDURE — S0020 INJECTION, BUPIVICAINE HYDRO: HCPCS | Performed by: ANESTHESIOLOGY

## 2018-07-27 PROCEDURE — 64495 INJ PARAVERT F JNT L/S 3 LEV: CPT | Mod: 50 | Performed by: ANESTHESIOLOGY

## 2018-07-27 PROCEDURE — 64493 INJ PARAVERT F JNT L/S 1 LEV: CPT | Mod: 50,,, | Performed by: ANESTHESIOLOGY

## 2018-07-27 PROCEDURE — 64495 INJ PARAVERT F JNT L/S 3 LEV: CPT | Mod: 50,,, | Performed by: ANESTHESIOLOGY

## 2018-07-27 PROCEDURE — 25000003 PHARM REV CODE 250: Performed by: ANESTHESIOLOGY

## 2018-07-27 PROCEDURE — 64494 INJ PARAVERT F JNT L/S 2 LEV: CPT | Mod: 50 | Performed by: ANESTHESIOLOGY

## 2018-07-27 RX ORDER — LIDOCAINE HYDROCHLORIDE 10 MG/ML
INJECTION INFILTRATION; PERINEURAL
Status: DISCONTINUED | OUTPATIENT
Start: 2018-07-27 | End: 2018-07-27 | Stop reason: HOSPADM

## 2018-07-27 RX ORDER — BUPIVACAINE HYDROCHLORIDE 5 MG/ML
INJECTION, SOLUTION EPIDURAL; INTRACAUDAL
Status: DISCONTINUED | OUTPATIENT
Start: 2018-07-27 | End: 2018-07-27 | Stop reason: HOSPADM

## 2018-07-27 RX ADMIN — LIDOCAINE HYDROCHLORIDE 10 ML: 10 INJECTION, SOLUTION INFILTRATION; PERINEURAL at 02:07

## 2018-07-27 RX ADMIN — SODIUM BICARBONATE 1 ML: 0.2 INJECTION, SOLUTION INTRAVENOUS at 02:07

## 2018-07-27 RX ADMIN — BUPIVACAINE HYDROCHLORIDE 10 ML: 5 INJECTION, SOLUTION EPIDURAL; INTRACAUDAL; PERINEURAL at 02:07

## 2018-07-27 NOTE — OP NOTE
Date of Procedure: 07/27/2018    Procedure: Bilateral L2-5 Lumbar medial branch blocks    Pre-op diagnosis: Lumbar Spondylosis [M47.816]; morbid obesity    Post-op diagnosis: Lumbar Spondylosis [M47.816]; morbid obesity    Physician: Dr. Edwina Williamson     Assistant: Dr. Doss    Anesthestia: local    EBL: None    Specimens: None    All medications, allergies, and relevant histories were reviewed. No recent antibiotics or infections.  A time-out was taken to verify the correct patient, procedure, laterality, and appropriate medications/allergies.    Lumbar Medial Branch Block:   The procedure risks, benefits, and possible complications were discussed with the patient including nerve damage, spinal headache, bleeding, infection, and failure of pain relief.   Patient was placed in the prone position with the midriff elevated. Oblique view of the spine was obtained with fluoroscopy. Entry sites marked over the skin. The skin was prepped with chlorhexidine x3 and draped. Sterile precautions observed throughout the procedure. Xylocaine 1% was infiltrated locally over the entry site. A 22-gauge spinal needle was introduced at an angle, and the needle was placed onto the junction of the superior articular process and the most supermedial point on the transverse process. Placement was confirmed with a fluoroscopic view. After negative aspiration, 1cc of bupivacaine 0.5% was injected at each level. Needle was restyletted and removed. Procedure performed at the levels indicated: Right L2-5, Left:L2-5.     Special equipment and extra time required due to obesity.    Patient tolerated the procedure well and there were no complications.   The patient was discharged home with a responsible adult.      Future Management:   If good results, can proceed to RFA.  If no relief, can follow up to discuss options.    I certify that I provided the above services.  I was present for the entire procedure, which was performed by myself with  the assistance of the resident physician.  There were no parts of the procedure that were performed not by myself or without my direct supervision.

## 2018-07-27 NOTE — DISCHARGE INSTRUCTIONS
Thank you for allowing us to care for you today. You may receive a survey about the care we provided. Your feedback is valuable and helps us provide excellent care throughout the community.     Home Care Instructions for Pain Management:    1. DIET:   You may resume your normal diet today.   2. BATHING:   You may shower with luke warm water. No tub baths or anything that will soak injection sites under water for the next 24 hours.  3. DRESSING:   You may remove your bandage today.   4. ACTIVITY LEVEL:   You may resume your normal activities 24 hrs after your procedure. Nothing strenuous today.  5. MEDICATIONS:   You may resume your normal medications today. To restart blood thinners, ask your doctor.  6. DRIVING    If you have received any sedatives by mouth today, you may not drive for 12 hours.    If you have received any sedation through your IV, you may not drive for 24 hrs.   7. SPECIAL INSTRUCTIONS:   No heat to the injection site for 24 hrs including, hot bath or shower, heating pad, moist heat, or hot tubs.    Use ice pack to injection site for any pain or discomfort.  Apply ice packs for 20 minute intervals as needed.    IF you have diabetes, be sure to monitor your blood sugar more closely. IF your injection contained steroids your blood sugar levels may become higher than normal.    If you are still having pain upon discharge:  Your pain may improve over the next 48 hours. The anesthetic (numbing medication) works immediately to 48 hours. IF your injection contained a steroid (anti-inflammatory medication), it takes approximately 3 days to start feeling relief and 7-10 days to see your greatest results from the medication. It is possible you may need subsequent injections. This would be discussed at your follow up appointment with pain management or your referring doctor.      PLEASE CALL YOUR DOCTOR IF:  1. Redness or swelling around the injection site.  2. Fever of 101 degrees or more  3. Drainage  (pus) from the injection site.  4. For any continuous bleeding (some dried blood over the incision is normal.)    FOR EMERGENCIES:   If any unusual problems or difficulties occur during clinic hours, call [phone] or 129.

## 2018-07-30 ENCOUNTER — TELEPHONE (OUTPATIENT)
Dept: PAIN MEDICINE | Facility: CLINIC | Age: 63
End: 2018-07-30

## 2018-07-30 ENCOUNTER — TELEPHONE (OUTPATIENT)
Dept: SPINE | Facility: CLINIC | Age: 63
End: 2018-07-30

## 2018-07-30 NOTE — TELEPHONE ENCOUNTER
----- Message from Marcia Aviles sent at 7/30/2018 10:35 AM CDT -----  Contact: Pt  Name of Who is Calling: SOPHIA BROWN [[id number]]    What is the request in detail: Patient called to give her pain dairy patient states that before the procedure her pain level was at a six, after the procedure her pain level was at a zero pain ,12 hours after procedure pain level was at a two and 24 hours after pain level was at a 2  . On today patient states she doesn't  have any pain today maybe a 1-2  Please contact to further discuss and advise    Can the clinic reply by [facility]: Yes    What [facility] to [facility] [phone]

## 2018-07-30 NOTE — TELEPHONE ENCOUNTER
----- Message from Susy Mcdonald RN sent at 7/30/2018  8:57 AM CDT -----  Please call the patient and clarify what the need is. She will need to collect the MRI herself and provide it to Dr. Gunn. A referral can be placed only if one of our doctors is actually referring her to Dr. Gunn.  ----- Message -----  From: Monae Mock MA  Sent: 7/27/2018   4:24 PM  To: Susy Mcdonald RN    It seems like she is asking for a referral and her Mri disk to go to a Dr.Aaron Gunn who is a neurosurgeon at [facility].         Nurse Susy:  I thought I send a response to your response to my request. I guess I didn't send correctly. I will be at the hospital tomorrow about 2:00 if you could have my referral ready. I stopped by the clinic today around 3:30 and there was no one at the desk.  Thank you

## 2018-07-30 NOTE — TELEPHONE ENCOUNTER
She would like to see Dr. Urbano at [facility].  A referral written at her request.  She will need to get a copy of her disc for MRI

## 2018-07-30 NOTE — TELEPHONE ENCOUNTER
Patient was contacted as per patient she stated that she needs a referral to see  on Thursday. Staff asked where was this physician's contact information patient stated that she doesn't know all she knows he's a neurosurgeon. She stated that she would contact the office later with the contact information

## 2018-08-06 ENCOUNTER — OFFICE VISIT (OUTPATIENT)
Dept: PAIN MEDICINE | Facility: CLINIC | Age: 63
End: 2018-08-06
Payer: MEDICARE

## 2018-08-06 VITALS
TEMPERATURE: 99 F | BODY MASS INDEX: 41.78 KG/M2 | HEART RATE: 99 BPM | SYSTOLIC BLOOD PRESSURE: 117 MMHG | DIASTOLIC BLOOD PRESSURE: 70 MMHG | WEIGHT: 227.06 LBS | HEIGHT: 62 IN

## 2018-08-06 DIAGNOSIS — M54.16 LUMBAR RADICULITIS: ICD-10-CM

## 2018-08-06 DIAGNOSIS — M47.816 LUMBAR SPONDYLOSIS: ICD-10-CM

## 2018-08-06 DIAGNOSIS — M47.26 OSTEOARTHRITIS OF SPINE WITH RADICULOPATHY, LUMBAR REGION: ICD-10-CM

## 2018-08-06 DIAGNOSIS — M48.062 SPINAL STENOSIS OF LUMBAR REGION WITH NEUROGENIC CLAUDICATION: Primary | ICD-10-CM

## 2018-08-06 DIAGNOSIS — M43.16 SPONDYLOLISTHESIS AT L4-L5 LEVEL: ICD-10-CM

## 2018-08-06 PROCEDURE — 99999 PR PBB SHADOW E&M-EST. PATIENT-LVL III: CPT | Mod: PBBFAC,,, | Performed by: NURSE PRACTITIONER

## 2018-08-06 PROCEDURE — 99213 OFFICE O/P EST LOW 20 MIN: CPT | Mod: PBBFAC | Performed by: NURSE PRACTITIONER

## 2018-08-06 PROCEDURE — 99213 OFFICE O/P EST LOW 20 MIN: CPT | Mod: S$PBB,,, | Performed by: NURSE PRACTITIONER

## 2018-08-06 NOTE — PROGRESS NOTES
Subjective:     Patient ID: Joanne Lino is a 63 y.o. female.    Chief Complaint: Pain    Consulted by: Tammi Matute    Disclaimer: This note was generated using voice recognition software.  There may be a typographical errors that were missed during proofreading.      HPI:    Joanne Lino is a 63 y.o. female who presents today with low back pain and leg numbness. The leg numbness is intermittent and only elicited after walking for long periods.  She has previously seen Dr. Matute in the back and spine center and has head several pain interventions performed (see below).  .   This pain is described as constant in the lower back, intermittently including the legs bilaterally.  This takes the form of numbness and cramping.  Alleviating factors are muscle relaxants.  Exacerbating factors are standing and walking for prolonged periods.  Pain is scaled as 7/10 with associated symptoms including intermittent numbness (once in previous month).    Interval History (8/6/2018):  The patient returns to clinic today for follow up. She is s/p bilateral L2-5 MBB on 7/27/2018. She reports 100% relief of her low back pain for 24 hours. She now reports that her pain has returned to baseline. She continues to report low back pain that is constant and aching in nature. She reports intermittent radiating pain into the posterior aspect of both legs. She describes her leg pain as numbness. Her pain is worse with prolonged walking. She often feels as though she will fall. She is currently discussing possible surgical solutions with neurosurgery at [facility]. She denies any other health changes. She denies any bowel or bladder incontinence.     Physical Therapy: HEP nearly every day.  Completed physical therapy 4 weeks ago.      Non-pharmacologic Treatment:     · Ice/Heat: Yes - minimal relief  · TENS: None  · Massage: None  · Chiropractic care: None  · Acupuncture: None  · Other: None         Pain Medications:         · Currently  taking: etodolac 200 mg tid, gabapentin 200 mg, cymbalta 30 mg    · Has tried in the past:  Cyclobenzaprine uses prn, helps with muscle spasms, tylenol    · Has not tried:  anticonvulsants, topical creams    Blood thinners: None    Interventional Therapies:   Facet joints L4-5, L5-S1 06.08.18  TFESI bilateral L5-S1 04/20/18  TFESI bilateral L5-S1L5    Relevant Surgeries: none    Affecting sleep? 3-4 hrs    Affecting daily activities? Yes    Depressive symptoms? No          · SI/HI? No    Work status: No    Prescription Monitoring Program database:  Reviewed and consistent with medication use as prescribed.    Pain Scales  Best: 2/10  Worst: 9/10  Usually: 5/10  Today: 4/10        Last 3 PDI Scores 8/6/2018 7/26/2018   Pain Disability Index (PDI) 45 45       Review of Systems   Constitution: Positive for weight gain. Negative for fever and weakness.   Cardiovascular: Negative for chest pain and claudication.   Respiratory: Negative for sleep disturbances due to breathing and snoring.    Endocrine: Negative for polydipsia, polyphagia and polyuria.   Hematologic/Lymphatic: Negative for adenopathy and bleeding problem.   Skin: Negative for skin cancer, suspicious lesions and unusual hair distribution.   Musculoskeletal: Positive for back pain, falls, joint pain, muscle cramps, muscle weakness and myalgias.   Gastrointestinal: Negative for bloating, abdominal pain and anorexia.   Genitourinary: Negative for bladder incontinence and pelvic pain.   Neurological: Negative for focal weakness, headaches, seizures and sensory change.   Psychiatric/Behavioral: Negative for altered mental status and suicidal ideas.             Past Medical History:   Diagnosis Date    Carpal tunnel syndrome     Depression     Ectopic pregnancy     High cholesterol     Hypertension     Osteoarthritis     PTSD (post-traumatic stress disorder)        Past Surgical History:   Procedure Laterality Date    INJECTION OF ANESTHETIC AGENT AROUND  NERVE Bilateral 7/27/2018    Procedure: BLOCK, NERVE;  Surgeon: Edwina Williamson MD;  Location: [facility];  Service: Pain Management;  Laterality: Bilateral;  Bilateral L2-L5 MBB      INJECTION OF FACET JOINT Bilateral 6/8/2018    Procedure: INJECTION, FACET JOINT;  Surgeon: Edwina Williamson MD;  Location: [facility];  Service: Pain Management;  Laterality: Bilateral;  B/L FACET INJS @ L4-5 AND L5-S1  [id number]      Consent Needed    OOPHORECTOMY      OVARIAN CYST REMOVAL         Review of patient's allergies indicates:   Allergen Reactions    Codeine Nausea Only    Pcn [penicillins] Itching       Current Outpatient Prescriptions   Medication Sig Dispense Refill    b complex vitamins tablet Take 1 tablet by mouth once daily.      CALCIUM CARBONATE (CALCIUM 500 ORAL) Take by mouth.      clonazePAM (KLONOPIN) 0.5 MG tablet Take 0.25 mg by mouth 2 (two) times daily.      cyclobenzaprine (FLEXERIL) 10 MG tablet Take 0.5-1 tablets (5-10 mg total) by mouth 3 (three) times daily as needed for Muscle spasms. 90 tablet 1    DULoxetine (CYMBALTA) 30 MG capsule Take 30 mg by mouth once daily.      etodolac (LODINE) 200 MG Cap TAKE 1 CAPSULE(200 MG) BY MOUTH THREE TIMES DAILY 90 capsule 0    gabapentin (NEURONTIN) 100 MG capsule TAKE 1 TO 2 CAPSULES(100  MG) BY MOUTH THREE TIMES DAILY 180 capsule 0    multivitamin capsule Take 1 capsule by mouth once daily.      simvastatin (ZOCOR) 40 MG tablet Take 40 mg by mouth once daily.       telmisartan (MICARDIS) 80 MG Tab Take by mouth once daily.       traZODone (DESYREL) 100 MG tablet Take 200 mg by mouth every evening.      vitamin D 1000 units Tab Take 1,000 Units by mouth once daily.       No current facility-administered medications for this visit.        Family History   Problem Relation Age of Onset    Cancer Mother     Heart disease Mother     Heart disease Father     Cancer Daughter        Social History     Social History    Marital  "status:      Spouse name: N/A    Number of children: N/A    Years of education: N/A     Occupational History    Not on file.     Social History Main Topics    Smoking status: Former Smoker     Quit date: 1988    Smokeless tobacco: Never Used    Alcohol use Yes      Comment: Socially    Drug use: No    Sexual activity: Not on file     Other Topics Concern    Not on file     Social History Narrative    No narrative on file       Objective:     Vitals:    08/06/18 1411   BP: 117/70   Pulse: 99   Temp: 99.1 °F (37.3 °C)   Weight: 103 kg (227 lb 1.2 oz)   Height: 5' 2" (1.575 m)   PainSc:   4        .GEN:  Well developed, well nourished.  No acute distress.   HEENT:  No trauma.  Mucous membranes moist.  Nares patent bilaterally.  PSYCH: Normal affect. Thought content appropriate.  CHEST:  Breathing symmetric.  No audible wheezing.  ABD: Soft, non-distended.  SKIN:  Warm, pink, dry.  No rash on exposed areas.    EXT:  No cyanosis, clubbing, or edema.  No color change or changes in nail or hair growth.  NEURO/MUSCULOSKELETAL:  Fully alert, oriented, and appropriate. Speech normal paramjit. No cranial nerve deficits.   Gait: Uses walker.  Negative trendelenburg sign bilaterally.   Motor Strength: 5/5 motor strength throughout lower extremities.   Sensory: Negative sensory deficit in the lower extremities.   Reflexes:  2+ and symmetric throughout.  Downgoing Babinski's bilaterally.  No clonus or spasticity.  L-Spine:  Full ROM with pain on extension. Positive facet loading bilaterally.  Negative SLR bilaterally.  Negative femoral stretch bilaterally.   SI Joint/Hip: + KALE on left only.  Negative FADIR bilaterally.  + TTP over lumbar paraspinals.  No point tenderness over bilateral SI joints, hips, piriformis muscles, or GTB.        Imaging:      MRI Lumbar Spine Without Contrast   Order: [id number]   Status:  Final result   Visible to patient:  Yes (Patient Portal) Next appt:  09/11/2018 at 10:00 AM in Spine " Services (Naomy Balderas PA-C) Dx:  Spinal enthesopathy; Neurogenic joseph...   Details     Reading Physician Reading Date Result Priority   Miguel Lala II, MD 3/13/2018    Narrative     EXAMINATION:  MRI LUMBAR SPINE WITHOUT CONTRAST    CLINICAL HISTORY:  Low back pain, >6wks conservative tx, persistent-progressive sx, surgical candidate; Lumbago with sciatica, right side    TECHNIQUE:  Multiplanar, multisequence MR images were acquired from the thoracolumbar junction to the sacrum without the administration of contrast.    COMPARISON:  Lumbar spine radiograph February 2, 2018    FINDINGS:  There are several hemangiomas the bodies of lumbar vertebra.  Paraspinal soft tissues are unremarkable.  The cord ends at L1.    At T12-L1 normal disc, central canal, foramina and facets.    At L1-2 moderate loss of disc height with disc desiccation.  Concentric herniation of disc, anteriorly a disc spur complex.  Posteriorly a broad-based contained extrusion 3.5 mm thickness, no significant central canal or foraminal stenosis.  Facets are unremarkable.    At L2-3 moderate loss of disc height with disc desiccation.  A large herniation posteriorly, a contained protrusion 6.5 mm thickness, is demonstrated and there is ligamentous hypertrophy.  The central canal AP diameter is 8 mm.  There is bilateral foraminal perineural fat.  Facet degeneration right side with hypertrophy.    At L3-4 normal disc height and signal.  Broad-base contained extrusion of disc, 4 mm thickness, and ligamentous hypertrophy posteriorly.  AP central canal diameter 9.5 mm.  No significant foraminal stenosis with perineural fat noted bilaterally.  Facet degeneration hypertrophy right side.    At L4-5 normal disc height with partial desiccation.  Grade 1 spondylolisthesis with severe facet and ligamentous degeneration and hypertrophy.  AP central canal diameter 8.2 mm.  50% loss of perineural fat right foramen, adequate perineural fat left foramen.    At  L5-S1 loss of normal disc height with disc desiccation.  The herniation is broad-based, a contained protrusion 3.3 mm thick, and with ligamentous and facet hypertrophy.  AP central canal diameter 11 mm.  Loss of perineural fat in the foramina, bilaterally.  Facet degeneration.    Normal sacroiliac joints.      Impression       1. Multilevel discogenic and spondylitic disease with varying degrees of central canal and/or foraminal stenoses as described.  2. Grade 1 spondylolisthesis L4-5.  Spondylolysis not identified but there is severe facet degeneration at this level especially.  3. Multilevel facet osteoarthropathy.      Electronically signed by: Miguel Lala II, MD  Date: 03/13/2018  Time: 10:48               Assessment:     Encounter Diagnoses   Name Primary?    Spinal stenosis of lumbar region with neurogenic claudication Yes    Spondylolisthesis at L4-L5 level     Lumbar radiculitis     Lumbar spondylosis     Osteoarthritis of spine with radiculopathy, lumbar region        Plan:     Joanne was seen today for low-back pain.    Diagnoses and all orders for this visit:    Spinal stenosis of lumbar region with neurogenic claudication    Spondylolisthesis at L4-L5 level    Lumbar radiculitis    Lumbar spondylosis    Osteoarthritis of spine with radiculopathy, lumbar region         Low back and leg pain pain is consistent with the above.    We discussed the assessment and recommendations.  All available images were reviewed. We discussed the disease process, prognosis, treatment plan, and risks and benefits. The patient is aware of the risks and benefits of the medications being prescribed, common side effects, and proper usage. The following is the plan we agreed on:     1. She is s/p bilateral L2-5 MBB with benefit.   2. She would like to think about RFA. She may call to schedule this in the future.   3. Continue HEP; discussed importance of continued activity.  4. Discussed s/s of cauda equina syndrome and  gave instruction to present to [facility] ED should these occur.  5. RTC PRN.     The above plan and management options were discussed at length with patient. Patient is in agreement with the above and verbalized understanding.     Peace Borjas NP  08/06/2018

## 2018-09-17 ENCOUNTER — OFFICE VISIT (OUTPATIENT)
Dept: PRIMARY CARE CLINIC | Facility: CLINIC | Age: 63
End: 2018-09-17
Payer: MEDICARE

## 2018-09-17 VITALS
SYSTOLIC BLOOD PRESSURE: 115 MMHG | OXYGEN SATURATION: 97 % | HEART RATE: 94 BPM | RESPIRATION RATE: 18 BRPM | TEMPERATURE: 99 F | DIASTOLIC BLOOD PRESSURE: 73 MMHG | WEIGHT: 232 LBS | HEIGHT: 62 IN | BODY MASS INDEX: 42.69 KG/M2

## 2018-09-17 DIAGNOSIS — Z11.59 NEED FOR HEPATITIS C SCREENING TEST: ICD-10-CM

## 2018-09-17 DIAGNOSIS — Z23 NEED FOR VACCINATION: ICD-10-CM

## 2018-09-17 DIAGNOSIS — Z12.31 ENCOUNTER FOR SCREENING MAMMOGRAM FOR BREAST CANCER: ICD-10-CM

## 2018-09-17 DIAGNOSIS — M51.36 DDD (DEGENERATIVE DISC DISEASE), LUMBAR: ICD-10-CM

## 2018-09-17 DIAGNOSIS — R73.03 BORDERLINE DIABETES: ICD-10-CM

## 2018-09-17 DIAGNOSIS — I10 ESSENTIAL HYPERTENSION, BENIGN: ICD-10-CM

## 2018-09-17 DIAGNOSIS — M43.16 SPONDYLOLISTHESIS AT L4-L5 LEVEL: ICD-10-CM

## 2018-09-17 DIAGNOSIS — F41.9 ANXIETY: ICD-10-CM

## 2018-09-17 DIAGNOSIS — E78.5 HYPERLIPIDEMIA, UNSPECIFIED HYPERLIPIDEMIA TYPE: ICD-10-CM

## 2018-09-17 DIAGNOSIS — Z01.818 PREOPERATIVE EXAMINATION: Primary | ICD-10-CM

## 2018-09-17 PROBLEM — M51.369 DDD (DEGENERATIVE DISC DISEASE), LUMBAR: Status: ACTIVE | Noted: 2018-09-17

## 2018-09-17 PROCEDURE — 99214 OFFICE O/P EST MOD 30 MIN: CPT | Mod: PBBFAC,PN | Performed by: FAMILY MEDICINE

## 2018-09-17 PROCEDURE — 99214 OFFICE O/P EST MOD 30 MIN: CPT | Mod: S$PBB,,, | Performed by: FAMILY MEDICINE

## 2018-09-17 PROCEDURE — 99999 PR PBB SHADOW E&M-EST. PATIENT-LVL IV: CPT | Mod: PBBFAC,,, | Performed by: FAMILY MEDICINE

## 2018-09-17 RX ORDER — CLONAZEPAM 1 MG/1
1 TABLET ORAL DAILY
Qty: 90 TABLET | Refills: 0 | Status: SHIPPED | OUTPATIENT
Start: 2018-09-17 | End: 2019-01-21

## 2018-09-17 RX ORDER — LAMOTRIGINE 100 MG/1
50 TABLET ORAL DAILY
COMMUNITY
End: 2019-03-22 | Stop reason: SDUPTHER

## 2018-09-17 RX ORDER — TELMISARTAN AND HYDROCHLORTHIAZIDE 80; 25 MG/1; MG/1
1 TABLET ORAL DAILY
Qty: 90 TABLET | Refills: 3 | Status: SHIPPED | OUTPATIENT
Start: 2018-09-17 | End: 2019-01-21 | Stop reason: SDUPTHER

## 2018-09-17 RX ORDER — TELMISARTAN 80 MG/1
80 TABLET ORAL DAILY
Qty: 90 TABLET | Refills: 1 | Status: CANCELLED | OUTPATIENT
Start: 2018-09-17

## 2018-09-17 RX ORDER — TELMISARTAN AND HYDROCHLORTHIAZIDE 80; 25 MG/1; MG/1
1 TABLET ORAL DAILY
COMMUNITY
End: 2018-09-17 | Stop reason: SDUPTHER

## 2018-09-17 NOTE — PROGRESS NOTES
Subjective:       Patient ID: Joanne Lino is a 63 y.o. female.    Chief Complaint: Pre-op Exam (patient says she is having a lumbar fussion and needs to be cleared for surgery. ) and Anxiety (patients psych was out of the country and asked that her PCP fill the Klonopin )    Scheduled for lower back surgery in early October.  Surgery will be performed on October on October 5th due to the extent of the required repair.  Patient had preop labs, chest x-ray and EKG done at [facility] last week.  Results all reviewed.  EKG normal, chest x-ray clear.  Labs unremarkable except elevated glucose (nonfasting) with slightly elevated A1c of 6.2%.  Patient has been getting frequent steroid injections, and she attributes her elevated blood sugar to this.  In any case, no prior surgical complications.  No recent illness or injury.  Chronic anxiety.  Has been stable on 1 mg of Klonopin daily for the past 3-4 years.  Her psychiatrist was out of country, and she is requesting that we provide her a refill of her medications.      Review of Systems   Constitutional: Negative for fever.   HENT: Negative for sore throat and trouble swallowing.    Eyes: Negative for visual disturbance.   Respiratory: Negative for cough, shortness of breath and wheezing.    Cardiovascular: Negative for chest pain and palpitations.   Gastrointestinal: Negative for blood in stool, diarrhea, nausea and vomiting.   Genitourinary: Negative for difficulty urinating.   Musculoskeletal: Negative for joint swelling.   Skin: Negative for rash and wound.   Neurological: Positive for numbness.   Hematological: Does not bruise/bleed easily.   Psychiatric/Behavioral: Negative for agitation and confusion. The patient is nervous/anxious.        Objective:      Vitals:    09/17/18 1107   BP: 115/73   BP Location: Right arm   Patient Position: Sitting   BP Method: Large (Automatic)   Pulse: 94   Resp: 18   Temp: 98.5 °F (36.9 °C)   TempSrc: Oral   SpO2: 97%   Weight: 105.2  "kg (232 lb)   Height: 5' 2" (1.575 m)     Physical Exam   Constitutional: She is oriented to person, place, and time. She appears well-developed and well-nourished.   HENT:   Head: Normocephalic and atraumatic.   Eyes: EOM are normal.   Neck: No JVD present.   Cardiovascular: Normal rate, regular rhythm and normal heart sounds.   Pulmonary/Chest: Effort normal and breath sounds normal.   Musculoskeletal: She exhibits no edema.   Neurological: She is alert and oriented to person, place, and time.   Skin: Skin is warm and dry.   Psychiatric: She has a normal mood and affect. Her behavior is normal.   Nursing note and vitals reviewed.      Assessment:       1. Preoperative examination    2. DDD (degenerative disc disease), lumbar    3. Spondylolisthesis at L4-L5 level    4. Essential hypertension, benign Stable   5. Anxiety Stable   6. Borderline diabetes    7. Hyperlipidemia, unspecified hyperlipidemia type    8. Need for hepatitis C screening test    9. Encounter for screening mammogram for breast cancer    10. Need for vaccination        Plan:       Preoperative examination  Comments:  Cleared for upcoming surgeries under general anesthesia    DDD (degenerative disc disease), lumbar    Spondylolisthesis at L4-L5 level    Essential hypertension, benign  -     telmisartan-hydrochlorothiazide (MICARDIS HCT) 80-25 mg per tablet; Take 1 tablet by mouth once daily.  Dispense: 90 tablet; Refill: 3  -     CBC auto differential; Future; Expected date: 12/17/2018    Anxiety  -     clonazePAM (KLONOPIN) 1 MG tablet; Take 1 tablet (1 mg total) by mouth once daily.  Dispense: 90 tablet; Refill: 0    Borderline diabetes  Comments:  Limit carbohydrate intake, repeat labs in 3-4 months.  Orders:  -     Comprehensive metabolic panel; Future; Expected date: 12/17/2018  -     Hemoglobin A1c; Future; Expected date: 12/17/2018    Hyperlipidemia, unspecified hyperlipidemia type  -     Comprehensive metabolic panel; Future; Expected date: " 12/17/2018  -     Lipid panel; Future; Expected date: 12/17/2018    Need for hepatitis C screening test  -     Hepatitis C antibody; Future; Expected date: 12/17/2018    Encounter for screening mammogram for breast cancer  Comments:  pt wants to do later after back surgery  Orders:  -     Mammo Digital Screening Bilat without CA; Future; Expected date: 10/01/2018    Need for vaccination  Comments:  declined influenza vaccination today, says she wants to wait until after surgery    Other orders  -     Cancel: telmisartan (MICARDIS) 80 MG Tab; Take 1 tablet (80 mg total) by mouth once daily.  Dispense: 90 tablet; Refill: 1         Medication List           Accurate as of 9/17/18 12:09 PM. If you have any questions, ask your nurse or doctor.               CHANGE how you take these medications    clonazePAM 1 MG tablet  Commonly known as:  KLONOPIN  Take 1 tablet (1 mg total) by mouth once daily.  What changed:    · how much to take  · when to take this  Changed by:  Navneet Mcconnell MD        CONTINUE taking these medications    b complex vitamins tablet     CALCIUM 500 ORAL     cyclobenzaprine 10 MG tablet  Commonly known as:  FLEXERIL  Take 0.5-1 tablets (5-10 mg total) by mouth 3 (three) times daily as needed for Muscle spasms.     DULoxetine 30 MG capsule  Commonly known as:  CYMBALTA     etodolac 200 MG Cap  Commonly known as:  LODINE  TAKE 1 CAPSULE(200 MG) BY MOUTH THREE TIMES DAILY     lamoTRIgine 25 MG tablet  Commonly known as:  LAMICTAL     multivitamin capsule     simvastatin 40 MG tablet  Commonly known as:  ZOCOR     telmisartan-hydrochlorothiazide 80-25 mg per tablet  Commonly known as:  MICARDIS HCT  Take 1 tablet by mouth once daily.     vitamin D 1000 units Tab  Commonly known as:  VITAMIN D3           Where to Get Your Medications      You can get these medications from any pharmacy    Bring a paper prescription for each of these medications  · clonazePAM 1 MG  tablet  · telmisartan-hydrochlorothiazide 80-25 mg per tablet

## 2019-01-21 ENCOUNTER — OFFICE VISIT (OUTPATIENT)
Dept: PRIMARY CARE CLINIC | Facility: CLINIC | Age: 64
End: 2019-01-21
Payer: MEDICARE

## 2019-01-21 VITALS
TEMPERATURE: 98 F | BODY MASS INDEX: 39.38 KG/M2 | RESPIRATION RATE: 18 BRPM | WEIGHT: 214 LBS | OXYGEN SATURATION: 99 % | HEIGHT: 62 IN | DIASTOLIC BLOOD PRESSURE: 70 MMHG | HEART RATE: 96 BPM | SYSTOLIC BLOOD PRESSURE: 108 MMHG

## 2019-01-21 DIAGNOSIS — I10 ESSENTIAL HYPERTENSION, BENIGN: ICD-10-CM

## 2019-01-21 DIAGNOSIS — E78.5 HYPERLIPIDEMIA, UNSPECIFIED HYPERLIPIDEMIA TYPE: ICD-10-CM

## 2019-01-21 DIAGNOSIS — M47.816 LUMBAR SPONDYLOSIS: ICD-10-CM

## 2019-01-21 DIAGNOSIS — M48.062 SPINAL STENOSIS OF LUMBAR REGION WITH NEUROGENIC CLAUDICATION: Primary | ICD-10-CM

## 2019-01-21 DIAGNOSIS — Z12.11 COLON CANCER SCREENING: ICD-10-CM

## 2019-01-21 PROCEDURE — 99214 PR OFFICE/OUTPT VISIT, EST, LEVL IV, 30-39 MIN: ICD-10-PCS | Mod: S$PBB,,, | Performed by: FAMILY MEDICINE

## 2019-01-21 PROCEDURE — 99214 OFFICE O/P EST MOD 30 MIN: CPT | Mod: PBBFAC,PN | Performed by: FAMILY MEDICINE

## 2019-01-21 PROCEDURE — 99999 PR PBB SHADOW E&M-EST. PATIENT-LVL IV: CPT | Mod: PBBFAC,,, | Performed by: FAMILY MEDICINE

## 2019-01-21 PROCEDURE — 99214 OFFICE O/P EST MOD 30 MIN: CPT | Mod: S$PBB,,, | Performed by: FAMILY MEDICINE

## 2019-01-21 PROCEDURE — 99999 PR PBB SHADOW E&M-EST. PATIENT-LVL IV: ICD-10-PCS | Mod: PBBFAC,,, | Performed by: FAMILY MEDICINE

## 2019-01-21 RX ORDER — TELMISARTAN AND HYDROCHLORTHIAZIDE 80; 25 MG/1; MG/1
1 TABLET ORAL DAILY
Qty: 90 TABLET | Refills: 1 | Status: SHIPPED | OUTPATIENT
Start: 2019-01-21 | End: 2019-10-29 | Stop reason: SDUPTHER

## 2019-01-21 RX ORDER — SIMVASTATIN 40 MG/1
40 TABLET, FILM COATED ORAL DAILY
Qty: 90 TABLET | Refills: 1 | Status: SHIPPED | OUTPATIENT
Start: 2019-01-21 | End: 2019-07-29 | Stop reason: SDUPTHER

## 2019-01-21 RX ORDER — CLONAZEPAM 1 MG/1
1 TABLET ORAL 2 TIMES DAILY PRN
COMMUNITY
End: 2019-04-02 | Stop reason: SDUPTHER

## 2019-01-21 NOTE — PROGRESS NOTES
"Subjective:       Patient ID: Joanne Lino is a 63 y.o. female.    Chief Complaint: Hypertension    Underwent complex lumbar spine surgery in early October, readmitted a few days later with sepsis (unclear infectious source) and acute renal failure, transferred to [facility] for rehab, discharged 1/11. Since discharge, doing a little better. Doing outpatient therapy at [facility]. Currently ambulating with rolling walker, thinks she may ultimately need power scooter. No fall since surgery.      Review of Systems   Constitutional: Negative for chills, diaphoresis and fever.   HENT: Negative for trouble swallowing.    Eyes: Negative for visual disturbance.   Respiratory: Negative for cough, shortness of breath and wheezing.    Cardiovascular: Negative for chest pain.   Gastrointestinal: Negative for blood in stool, diarrhea and vomiting.   Genitourinary: Negative for difficulty urinating.   Musculoskeletal: Positive for arthralgias, back pain and gait problem.   Skin: Negative for rash and wound.   Neurological: Negative for dizziness and light-headedness.   Hematological: Does not bruise/bleed easily.   Psychiatric/Behavioral: Negative for agitation and confusion.       Objective:      Vitals:    01/21/19 1054   BP: 108/70   BP Location: Left arm   Patient Position: Sitting   BP Method: Large (Automatic)   Pulse: 96   Resp: 18   Temp: 98.1 °F (36.7 °C)   TempSrc: Oral   SpO2: 99%   Weight: 97.1 kg (214 lb)   Height: 5' 2" (1.575 m)   No results found for: WBC, HGB, HCT, PLT, CHOL, TRIG, HDL, LDLDIRECT, ALT, AST, NA, K, CL, CREATININE, BUN, CO2, TSH, PSA, INR, GLUF, HGBA1C, MICROALBUR  Physical Exam   Constitutional: She is oriented to person, place, and time. She appears well-developed and well-nourished.   HENT:   Head: Normocephalic and atraumatic.   Eyes: EOM are normal.   Neck: No JVD present.   Cardiovascular: Normal rate, regular rhythm and normal heart sounds.   Pulmonary/Chest: Effort " normal and breath sounds normal.   Musculoskeletal: She exhibits no edema.   Neurological: She is alert and oriented to person, place, and time.   Skin: Skin is warm and dry.   Lower back and left flank incisions well-healed   Psychiatric: She has a normal mood and affect. Her behavior is normal.   Nursing note and vitals reviewed.      Assessment:       1. Spinal stenosis of lumbar region with neurogenic claudication    2. Essential hypertension, benign Stable   3. Lumbar spondylosis    4. Hyperlipidemia, unspecified hyperlipidemia type    5. Colon cancer screening        Plan:       Spinal stenosis of lumbar region with neurogenic claudication  Continue with physical therapy  Essential hypertension, benign  -     telmisartan-hydrochlorothiazide (MICARDIS HCT) 80-25 mg per tablet; Take 1 tablet by mouth once daily.  Dispense: 90 tablet; Refill: 1  Well controlled on current regimen  Lumbar spondylosis    Hyperlipidemia, unspecified hyperlipidemia type  -     simvastatin (ZOCOR) 40 MG tablet; Take 1 tablet (40 mg total) by mouth once daily.  Dispense: 90 tablet; Refill: 1   Needs updated fasting labs in the next few months  Colon cancer screening  -     Fecal Immunochemical Test (iFOBT); Future; Expected date: 01/21/2019         Medication List           Accurate as of 1/21/19  2:57 PM. If you have any questions, ask your nurse or doctor.               CHANGE how you take these medications    clonazePAM 0.5 MG tablet  Commonly known as:  KLONOPIN  What changed:  Another medication with the same name was removed. Continue taking this medication, and follow the directions you see here.  Changed by:  Navneet Mcconnell MD        CONTINUE taking these medications    b complex vitamins tablet     CALCIUM 500 ORAL     DULoxetine 30 MG capsule  Commonly known as:  CYMBALTA     lamoTRIgine 25 MG tablet  Commonly known as:  LAMICTAL     multivitamin capsule     simvastatin 40 MG tablet  Commonly known as:  ZOCOR  Take 1 tablet  (40 mg total) by mouth once daily.     telmisartan-hydrochlorothiazide 80-25 mg per tablet  Commonly known as:  MICARDIS HCT  Take 1 tablet by mouth once daily.     vitamin D 1000 units Tab  Commonly known as:  VITAMIN D3        STOP taking these medications    cyclobenzaprine 10 MG tablet  Commonly known as:  FLEXERIL  Stopped by:  Navneet Mcconnell MD     etodolac 200 MG Cap  Commonly known as:  LODINE  Stopped by:  Navneet Mcconnell MD           Where to Get Your Medications      You can get these medications from any pharmacy    Bring a paper prescription for each of these medications  · simvastatin 40 MG tablet  · telmisartan-hydrochlorothiazide 80-25 mg per tablet

## 2019-02-15 ENCOUNTER — TELEPHONE (OUTPATIENT)
Dept: ADMINISTRATIVE | Facility: HOSPITAL | Age: 64
End: 2019-02-15

## 2019-02-15 ENCOUNTER — TELEPHONE (OUTPATIENT)
Dept: PRIMARY CARE CLINIC | Facility: CLINIC | Age: 64
End: 2019-02-15

## 2019-02-15 NOTE — TELEPHONE ENCOUNTER
Placed call to patient at [phone]. Unable to leave message on machine to schedule Mammogram. Letter mailed to address on file.

## 2019-02-15 NOTE — TELEPHONE ENCOUNTER
Spoke w/ patient. Patient states she will wait on scheduling Mammo. Discussed importance of having routine mammo done and early detection of breast cancer. Patient verbalized understanding and advised she will call me back when she is ready to schedule testing. No further info needed at this time and call was ended.

## 2019-02-15 NOTE — TELEPHONE ENCOUNTER
----- Message from Iris Keyes sent at 2/15/2019 10:21 AM CST -----  Contact: Patient  Type:  Patient Returning Call    Who Called:  Patient  Who Left Message for Patient:  Kyle  Does the patient know what this is regarding?:  Does not know nature of call  Best Call Back Number:

## 2019-03-22 ENCOUNTER — OFFICE VISIT (OUTPATIENT)
Dept: PRIMARY CARE CLINIC | Facility: CLINIC | Age: 64
End: 2019-03-22
Payer: MEDICARE

## 2019-03-22 VITALS
WEIGHT: 218.13 LBS | HEART RATE: 96 BPM | HEIGHT: 62 IN | RESPIRATION RATE: 18 BRPM | DIASTOLIC BLOOD PRESSURE: 71 MMHG | BODY MASS INDEX: 40.14 KG/M2 | OXYGEN SATURATION: 98 % | SYSTOLIC BLOOD PRESSURE: 107 MMHG | TEMPERATURE: 98 F

## 2019-03-22 DIAGNOSIS — E78.2 MIXED HYPERLIPIDEMIA: ICD-10-CM

## 2019-03-22 DIAGNOSIS — E66.01 SEVERE OBESITY (BMI 35.0-39.9) WITH COMORBIDITY: ICD-10-CM

## 2019-03-22 DIAGNOSIS — E87.6 HYPOKALEMIA: ICD-10-CM

## 2019-03-22 DIAGNOSIS — Z12.4 CERVICAL CANCER SCREENING: ICD-10-CM

## 2019-03-22 DIAGNOSIS — F33.2 SEVERE EPISODE OF RECURRENT MAJOR DEPRESSIVE DISORDER, WITHOUT PSYCHOTIC FEATURES: ICD-10-CM

## 2019-03-22 DIAGNOSIS — I10 ESSENTIAL HYPERTENSION, BENIGN: ICD-10-CM

## 2019-03-22 DIAGNOSIS — Z12.31 ENCOUNTER FOR SCREENING MAMMOGRAM FOR BREAST CANCER: ICD-10-CM

## 2019-03-22 DIAGNOSIS — Z12.11 COLON CANCER SCREENING: ICD-10-CM

## 2019-03-22 DIAGNOSIS — R73.03 BORDERLINE DIABETES: Primary | ICD-10-CM

## 2019-03-22 DIAGNOSIS — Z23 NEED FOR VACCINATION: ICD-10-CM

## 2019-03-22 PROCEDURE — 99214 OFFICE O/P EST MOD 30 MIN: CPT | Mod: S$PBB,,, | Performed by: FAMILY MEDICINE

## 2019-03-22 PROCEDURE — 99213 OFFICE O/P EST LOW 20 MIN: CPT | Mod: PBBFAC,PN | Performed by: FAMILY MEDICINE

## 2019-03-22 PROCEDURE — 99999 PR PBB SHADOW E&M-EST. PATIENT-LVL III: CPT | Mod: PBBFAC,,, | Performed by: FAMILY MEDICINE

## 2019-03-22 PROCEDURE — 99999 PR PBB SHADOW E&M-EST. PATIENT-LVL III: ICD-10-PCS | Mod: PBBFAC,,, | Performed by: FAMILY MEDICINE

## 2019-03-22 PROCEDURE — 99214 PR OFFICE/OUTPT VISIT, EST, LEVL IV, 30-39 MIN: ICD-10-PCS | Mod: S$PBB,,, | Performed by: FAMILY MEDICINE

## 2019-03-22 RX ORDER — LAMOTRIGINE 100 MG/1
100 TABLET ORAL DAILY
Qty: 90 TABLET | Refills: 1 | Status: SHIPPED | OUTPATIENT
Start: 2019-03-22 | End: 2019-05-14 | Stop reason: SDUPTHER

## 2019-03-22 RX ORDER — HYDROCORTISONE 25 MG/G
OINTMENT TOPICAL 2 TIMES DAILY PRN
COMMUNITY
End: 2019-03-22 | Stop reason: SDUPTHER

## 2019-03-22 RX ORDER — HYDROCORTISONE 25 MG/G
OINTMENT TOPICAL 2 TIMES DAILY PRN
Qty: 28.35 G | Refills: 3 | Status: SHIPPED | OUTPATIENT
Start: 2019-03-22 | End: 2019-04-09 | Stop reason: SDUPTHER

## 2019-03-22 RX ORDER — DULOXETIN HYDROCHLORIDE 30 MG/1
60 CAPSULE, DELAYED RELEASE ORAL DAILY
Qty: 90 CAPSULE | Refills: 1 | Status: SHIPPED | OUTPATIENT
Start: 2019-03-22 | End: 2019-05-14 | Stop reason: SDUPTHER

## 2019-03-22 NOTE — PROGRESS NOTES
"Subjective:       Patient ID: Joanne Lino is a 64 y.o. female.    Chief Complaint: Follow-up    Recent labs reviewed.  A1c elevated, fasting glucose elevated and potassium low.  Triglycerides also elevated.  Had not been getting her simvastatin for 3 months while she was in rehab, recently started back on it.  Eating a moderately high carbohydrate diet, particularly starchy foods and fruits.  Compliant with medications.  History of major depression, had been under the care of a psychiatrist in [place], stable on current regimen, requesting refill.  Has been making progress with physical activity in therapy, becoming more more progressively physically active, still working on building up her insurance.      Review of Systems   Constitutional: Negative for chills, fatigue and fever.   HENT: Negative for congestion.    Eyes: Negative for visual disturbance.   Respiratory: Negative for cough and shortness of breath.    Cardiovascular: Negative for chest pain.   Gastrointestinal: Negative for abdominal pain, nausea and vomiting.   Genitourinary: Negative for difficulty urinating.   Musculoskeletal: Positive for arthralgias and back pain.   Skin: Negative for rash and wound.   Neurological: Negative for dizziness.   Psychiatric/Behavioral: Negative for agitation, hallucinations, self-injury and sleep disturbance.       Objective:      Vitals:    03/22/19 1031   BP: 107/71   BP Location: Left arm   Patient Position: Sitting   BP Method: Large (Automatic)   Pulse: 96   Resp: 18   Temp: 98 °F (36.7 °C)   TempSrc: Oral   SpO2: 98%   Weight: 98.9 kg (218 lb 1.6 oz)   Height: 5' 2" (1.575 m)     Lab Results   Component Value Date    WBC 9.30 03/15/2019    HGB 12.7 03/15/2019    HCT 37.1 03/15/2019     03/15/2019    CHOL 183 03/15/2019    TRIG 219 (H) 03/15/2019    HDL 49 03/15/2019    ALT 13 (L) 03/15/2019    AST 21 03/15/2019     03/15/2019    K 3.3 (L) 03/15/2019    CL 98 (L) 03/15/2019    CREATININE 0.8 " 03/15/2019    BUN 16 03/15/2019    CO2 29 03/15/2019    HGBA1C 6.2 (H) 03/15/2019     Physical Exam   Constitutional: She is oriented to person, place, and time. She appears well-developed and well-nourished.   HENT:   Head: Normocephalic and atraumatic.   Eyes: EOM are normal.   Neck: No JVD present.   Cardiovascular: Normal rate, regular rhythm and normal heart sounds.   Pulmonary/Chest: Effort normal and breath sounds normal.   Musculoskeletal: She exhibits no edema.   Neurological: She is alert and oriented to person, place, and time.   Skin: Skin is warm and dry.   Psychiatric: She has a normal mood and affect. Her behavior is normal.   Nursing note and vitals reviewed.      Assessment:       1. Borderline diabetes    2. Mixed hyperlipidemia    3. Essential hypertension, benign    4. Severe obesity (BMI 35.0-39.9) with comorbidity    5. Need for vaccination    6. Encounter for screening mammogram for breast cancer    7. Cervical cancer screening    8. Colon cancer screening    9. Hypokalemia    10. Severe episode of recurrent major depressive disorder, without psychotic features        Plan:       Borderline diabetes  -     Comprehensive metabolic panel; Future; Expected date: 06/22/2019  -     Hemoglobin A1c; Future; Expected date: 06/22/2019  Stressed importance of reducing carbohydrate intake to prevent progression  Mixed hyperlipidemia  -     Comprehensive metabolic panel; Future; Expected date: 06/22/2019  -     Lipid panel; Future; Expected date: 06/22/2019  -     TSH; Future; Expected date: 03/22/2019  Triglycerides should improve with dietary changes and continued medication compliance  Essential hypertension, benign  Stable  Severe obesity (BMI 35.0-39.9) with comorbidity  Stressed importance of low carb diet and exercise  Encounter for screening mammogram for breast cancer  Mammogram scheduled  Cervical cancer screening  -     Ambulatory referral to Obstetrics / Gynecology    Colon cancer  screening  Encouraged to return fit kit  Hypokalemia  -     Basic metabolic panel; Future; Expected date: 03/22/2019  Repeat labs today  Severe episode of recurrent major depressive disorder, without psychotic features  -     lamoTRIgine (LAMICTAL) 100 MG tablet; Take 1 tablet (100 mg total) by mouth once daily.  Dispense: 90 tablet; Refill: 1  -     DULoxetine (CYMBALTA) 30 MG capsule; Take 2 capsules (60 mg total) by mouth once daily.  Dispense: 90 capsule; Refill: 1  Stable on current regimen  Other orders  -     hydrocortisone 2.5 % ointment; Apply topically 2 (two) times daily as needed.  Dispense: 28.35 g; Refill: 3      Medication List with Changes/Refills   Current Medications    B COMPLEX VITAMINS TABLET    Take 1 tablet by mouth once daily.    CALCIUM CARBONATE (CALCIUM 500 ORAL)    Take 1 tablet by mouth once daily.     CLONAZEPAM (KLONOPIN) 1 MG TABLET    Take 1 mg by mouth 2 (two) times daily as needed for Anxiety.     MULTIVITAMIN CAPSULE    Take 1 capsule by mouth once daily.    SIMVASTATIN (ZOCOR) 40 MG TABLET    Take 1 tablet (40 mg total) by mouth once daily.    TELMISARTAN-HYDROCHLOROTHIAZIDE (MICARDIS HCT) 80-25 MG PER TABLET    Take 1 tablet by mouth once daily.    VITAMIN E 600 UNIT CAPSULE    Take 600 Units by mouth once daily.   Changed and/or Refilled Medications    Modified Medication Previous Medication    DULOXETINE (CYMBALTA) 30 MG CAPSULE DULoxetine (CYMBALTA) 30 MG capsule       Take 2 capsules (60 mg total) by mouth once daily.    Take 60 mg by mouth once daily.     HYDROCORTISONE 2.5 % OINTMENT hydrocortisone 2.5 % ointment       Apply topically 2 (two) times daily as needed.    Apply topically 2 (two) times daily as needed.    LAMOTRIGINE (LAMICTAL) 100 MG TABLET lamoTRIgine (LAMICTAL) 100 MG tablet       Take 1 tablet (100 mg total) by mouth once daily.    Take 50 mg by mouth once daily.    Discontinued Medications    VITAMIN D 1000 UNITS TAB    Take 1,000 Units by mouth once  daily.

## 2019-03-28 DIAGNOSIS — E87.6 HYPOKALEMIA: Primary | ICD-10-CM

## 2019-03-28 RX ORDER — POTASSIUM CHLORIDE 750 MG/1
10 TABLET, EXTENDED RELEASE ORAL DAILY
Qty: 7 TABLET | Refills: 0 | Status: SHIPPED | OUTPATIENT
Start: 2019-03-28 | End: 2019-04-04

## 2019-03-29 ENCOUNTER — TELEPHONE (OUTPATIENT)
Dept: PRIMARY CARE CLINIC | Facility: CLINIC | Age: 64
End: 2019-03-29

## 2019-03-29 NOTE — TELEPHONE ENCOUNTER
----- Message from Lexi Zelaya sent at 3/29/2019 10:28 AM CDT -----  Contact: Patient  Type:  RX Refill Request    Who Called:  Joanne, patient  Refill or New Rx:  Refill  RX Name and Strength:  clonazePAM (KLONOPIN) 1 MG tablet  How is the patient currently taking it? (ex. 1XDay):   Take 1 mg by mouth 2 (two) times daily as needed for Anxiety  Is this a 30 day or 90 day RX:  30  Preferred Pharmacy with phone number:    [id number] - [place] - [address] [place] [facility] [address]  [address] [address]  Phone: [phone] Fax: [phone]  Local or Mail Order:  Local  Ordering Provider:  Dr Enedina Dunbar Call Back Number:  [phone]  Additional Information:  Please advise. Thanks.

## 2019-04-01 ENCOUNTER — OFFICE VISIT (OUTPATIENT)
Dept: OBSTETRICS AND GYNECOLOGY | Facility: CLINIC | Age: 64
End: 2019-04-01
Payer: MEDICARE

## 2019-04-01 VITALS
DIASTOLIC BLOOD PRESSURE: 72 MMHG | WEIGHT: 224.44 LBS | BODY MASS INDEX: 41.3 KG/M2 | SYSTOLIC BLOOD PRESSURE: 124 MMHG | HEIGHT: 62 IN

## 2019-04-01 DIAGNOSIS — N63.0 BREAST NODULE: Primary | ICD-10-CM

## 2019-04-01 DIAGNOSIS — Z01.419 WELL WOMAN EXAM WITH ROUTINE GYNECOLOGICAL EXAM: Primary | ICD-10-CM

## 2019-04-01 DIAGNOSIS — Z12.4 ENCOUNTER FOR SCREENING FOR MALIGNANT NEOPLASM OF CERVIX: ICD-10-CM

## 2019-04-01 PROCEDURE — 88142 CYTOPATH C/V THIN LAYER: CPT

## 2019-04-01 PROCEDURE — 99999 PR PBB SHADOW E&M-EST. PATIENT-LVL III: CPT | Mod: PBBFAC,,, | Performed by: OBSTETRICS & GYNECOLOGY

## 2019-04-01 PROCEDURE — G0101 PR CA SCREEN;PELVIC/BREAST EXAM: ICD-10-PCS | Mod: S$PBB,,, | Performed by: OBSTETRICS & GYNECOLOGY

## 2019-04-01 PROCEDURE — 99213 OFFICE O/P EST LOW 20 MIN: CPT | Mod: PBBFAC,PN | Performed by: OBSTETRICS & GYNECOLOGY

## 2019-04-01 PROCEDURE — 99999 PR PBB SHADOW E&M-EST. PATIENT-LVL III: ICD-10-PCS | Mod: PBBFAC,,, | Performed by: OBSTETRICS & GYNECOLOGY

## 2019-04-01 PROCEDURE — 87624 HPV HI-RISK TYP POOLED RSLT: CPT

## 2019-04-01 PROCEDURE — G0101 CA SCREEN;PELVIC/BREAST EXAM: HCPCS | Mod: S$PBB,,, | Performed by: OBSTETRICS & GYNECOLOGY

## 2019-04-01 NOTE — LETTER
April 1, 2019      Navneet Mcconnell MD  [address]  [address]  [place] [address]           [facility]  [address], [facility] [address]  [place] [address]  Phone: [phone]  Fax: [phone]          Patient: Joanne Lino   MR Number: [id number]   YOB: 1955   Date of Visit: 4/1/2019       Dear Dr. Navneet Mcconnell:    Thank you for referring Joanne Lino to me for evaluation. Attached you will find relevant portions of my assessment and plan of care.    If you have questions, please do not hesitate to call me. I look forward to following Joanne Lino along with you.    Sincerely,    Cesario Rose MD    Enclosure  CC:  No Recipients    If you would like to receive this communication electronically, please contact [email or web address] or [phone] to request more information on [employer] Link access.    For providers and/or their staff who would like to refer a patient to [facility], please contact us through our one-stop-shop provider referral line, [facility], at [phone].    If you feel you have received this communication in error or would no longer like to receive these types of communications, please e-mail [email or web address]

## 2019-04-01 NOTE — PROGRESS NOTES
History & Physical  Gynecology      SUBJECTIVE:     Chief Complaint: Gynecologic Exam       History of Present Illness:  Ms. Lino is a 64 yr old female who presents for annual, well woman exam. Complaints: None. Postmenopausal. Denies vaginal bleeding. No hx abnormal paps. Last pap was unsure. Currently not sexually active. Denies hx of STIs. Denies fam hx of breast, ovarian or colon cancer. Feels safe at home, wears seatbelts, exercises intermittently.        Review of patient's allergies indicates:   Allergen Reactions    Codeine Nausea Only    Pcn [penicillins] Itching       Past Medical History:   Diagnosis Date    Carpal tunnel syndrome     Depression     Ectopic pregnancy     High cholesterol     Hypertension     Osteoarthritis     PTSD (post-traumatic stress disorder)      Past Surgical History:   Procedure Laterality Date    BLOCK, NERVE Bilateral 2018    [facility] at [facility]    INJECTION, FACET JOINT Bilateral 2018    [facility] [place] [facility]    INJECTION-STEROID-EPIDURAL-TRANSFORAMINAL Bilateral 2018    [facility] at [facility]    INJECTION-STEROID-EPIDURAL-TRANSFORAMINAL Bilateral 3/22/2018    Performed by Suresh Thacker MD at [facility]    LUMBAR FUSION      OOPHORECTOMY      OVARIAN CYST REMOVAL       OB History        4    Para   3    Term                AB   1    Living           SAB        TAB        Ectopic   1    Multiple        Live Births                   Family History   Problem Relation Age of Onset    Cancer Mother     Heart disease Mother     Heart disease Father     Cancer Daughter     Breast cancer Neg Hx     Colon cancer Neg Hx     Ovarian cancer Neg Hx      Social History     Tobacco Use    Smoking status: Former Smoker     Last attempt to quit:      Years since quittin.2    Smokeless tobacco: Never Used   Substance Use Topics    Alcohol use:  Yes     Comment: Socially    Drug use: No       Current Outpatient Medications   Medication Sig    b complex vitamins tablet Take 1 tablet by mouth once daily.    CALCIUM CARBONATE (CALCIUM 500 ORAL) Take 1 tablet by mouth once daily.     clonazePAM (KLONOPIN) 1 MG tablet Take 1 mg by mouth 2 (two) times daily as needed for Anxiety.     DULoxetine (CYMBALTA) 30 MG capsule Take 2 capsules (60 mg total) by mouth once daily.    hydrocortisone 2.5 % ointment Apply topically 2 (two) times daily as needed.    lamoTRIgine (LAMICTAL) 100 MG tablet Take 1 tablet (100 mg total) by mouth once daily.    multivitamin capsule Take 1 capsule by mouth once daily.    potassium chloride (KLOR-CON) 10 MEQ TbSR Take 1 tablet (10 mEq total) by mouth once daily. for 7 days    simvastatin (ZOCOR) 40 MG tablet Take 1 tablet (40 mg total) by mouth once daily.    telmisartan-hydrochlorothiazide (MICARDIS HCT) 80-25 mg per tablet Take 1 tablet by mouth once daily.    vitamin E 600 UNIT capsule Take 600 Units by mouth once daily.     No current facility-administered medications for this visit.          Review of Systems:  Review of Systems   Constitutional: Negative for activity change, fatigue, fever and unexpected weight change.   Respiratory: Negative for cough and shortness of breath.    Cardiovascular: Negative for chest pain and palpitations.   Gastrointestinal: Negative for abdominal pain, constipation, diarrhea and nausea.   Endocrine: Negative for hot flashes.   Genitourinary: Negative for dyspareunia, dysuria, menorrhagia, menstrual problem, pelvic pain and vaginal discharge.   Musculoskeletal: Negative for back pain.   Integumentary:  Negative for nipple discharge.   Neurological: Negative for headaches.   Psychiatric/Behavioral: The patient is not nervous/anxious.    Breast: Negative for nipple discharge       OBJECTIVE:     Physical Exam:  Physical Exam   Constitutional: She is oriented to person, place, and time. She  appears well-developed and well-nourished.   HENT:   Head: Normocephalic and atraumatic.   Neck: Normal range of motion. Neck supple.   Cardiovascular: Normal rate, regular rhythm, normal heart sounds and intact distal pulses.   Pulmonary/Chest: Effort normal and breath sounds normal. Right breast exhibits no inverted nipple, no mass, no nipple discharge, no skin change and no tenderness. Left breast exhibits no inverted nipple, no mass, no nipple discharge, no skin change and no tenderness.   Abdominal: Soft. Bowel sounds are normal. There is no tenderness. There is no guarding.   Genitourinary: There is no rash, tenderness, lesion or injury on the right labia. There is no rash, tenderness, lesion or injury on the left labia. Uterus is not deviated, not enlarged, not fixed and not tender. Cervix exhibits no motion tenderness, no discharge and no friability. Right adnexum displays no mass, no tenderness and no fullness. Left adnexum displays no mass, no tenderness and no fullness. No erythema, tenderness or bleeding in the vagina. No foreign body in the vagina. No signs of injury around the vagina. No vaginal discharge found.   Neurological: She is alert and oriented to person, place, and time.   Skin: Skin is warm and dry.   Psychiatric: She has a normal mood and affect.   Vitals reviewed.        ASSESSMENT:       ICD-10-CM ICD-9-CM    1. Well woman exam with routine gynecological exam Z01.419 V72.31    2. Encounter for screening for malignant neoplasm of cervix Z12.4 V76.2 Liquid-based pap smear, screening      HPV High Risk Genotypes, PCR          Plan:      Annual, well woman, pap/cotesting done today. No hx of abnormal paps  Mammogram ordered and scheduled by PCP  Has never had colonoscopy. Declines scope. Has FOBT cards at home  No risk factors for osteoporosis. Will perform DEXA at age 65    Counseling time: 15 minutes    Cesario Rose

## 2019-04-02 RX ORDER — CLONAZEPAM 1 MG/1
1 TABLET ORAL 2 TIMES DAILY PRN
Qty: 60 TABLET | Refills: 1 | Status: SHIPPED | OUTPATIENT
Start: 2019-04-02 | End: 2019-10-29 | Stop reason: SDUPTHER

## 2019-04-02 NOTE — TELEPHONE ENCOUNTER
Patients psychiatrist moved and she has an appointment with another one in May. She is asking fir a refill on her Klonopin until then

## 2019-04-04 LAB
HPV HR 12 DNA CVX QL NAA+PROBE: NEGATIVE
HPV16 AG SPEC QL: NEGATIVE
HPV18 DNA SPEC QL NAA+PROBE: NEGATIVE

## 2019-04-09 RX ORDER — HYDROCORTISONE 25 MG/G
OINTMENT TOPICAL 2 TIMES DAILY PRN
Qty: 28.35 G | Refills: 3 | Status: SHIPPED | OUTPATIENT
Start: 2019-04-09 | End: 2019-08-22

## 2019-04-09 NOTE — TELEPHONE ENCOUNTER
----- Message from Lexi Zelaya sent at 4/9/2019 10:55 AM CDT -----  Contact: Patient  Type: Needs Medical Advice    Who Called:  Joanne, patient  Symptoms (please be specific):  N/A  How long has patient had these symptoms:  N/A  Pharmacy name and phone #:    [facility] [id number] - [place] - [address] [place] [facility] [address]  [address] [address]  Phone: [phone] Fax: [phone]  Best Call Back Number: [phone]  Additional Information: Please transfer Rx hydrocortisone 2.5 % ointment to [facility] in [place]. Thanks.

## 2019-04-15 ENCOUNTER — TELEPHONE (OUTPATIENT)
Dept: PRIMARY CARE CLINIC | Facility: CLINIC | Age: 64
End: 2019-04-15

## 2019-04-15 NOTE — TELEPHONE ENCOUNTER
----- Message from Lexi Zelaya sent at 4/15/2019  8:06 AM CDT -----  Contact: Patient  Type:  Patient Returning Call    Who Called:  shai Rubio  Who Left Message for Patient:  Isidoro  Does the patient know what this is regarding?:  Lab results  Best Call Back Number:  [phone]  Additional Information:  Missed your call, please call her back. Thanks.

## 2019-05-14 ENCOUNTER — OFFICE VISIT (OUTPATIENT)
Dept: PSYCHIATRY | Facility: CLINIC | Age: 64
End: 2019-05-14
Payer: MEDICARE

## 2019-05-14 VITALS
SYSTOLIC BLOOD PRESSURE: 141 MMHG | HEIGHT: 62 IN | BODY MASS INDEX: 41.75 KG/M2 | HEART RATE: 109 BPM | DIASTOLIC BLOOD PRESSURE: 64 MMHG | WEIGHT: 226.88 LBS

## 2019-05-14 DIAGNOSIS — F43.10 PTSD (POST-TRAUMATIC STRESS DISORDER): ICD-10-CM

## 2019-05-14 DIAGNOSIS — F33.2 SEVERE EPISODE OF RECURRENT MAJOR DEPRESSIVE DISORDER, WITHOUT PSYCHOTIC FEATURES: ICD-10-CM

## 2019-05-14 DIAGNOSIS — F41.9 ANXIETY: Primary | ICD-10-CM

## 2019-05-14 DIAGNOSIS — F31.9 BIPOLAR 1 DISORDER: ICD-10-CM

## 2019-05-14 PROCEDURE — 99212 OFFICE O/P EST SF 10 MIN: CPT | Mod: PBBFAC | Performed by: NURSE PRACTITIONER

## 2019-05-14 PROCEDURE — 99215 OFFICE O/P EST HI 40 MIN: CPT | Mod: S$PBB,,, | Performed by: NURSE PRACTITIONER

## 2019-05-14 PROCEDURE — 99999 PR PBB SHADOW E&M-EST. PATIENT-LVL II: CPT | Mod: PBBFAC,,, | Performed by: NURSE PRACTITIONER

## 2019-05-14 PROCEDURE — 99999 PR PBB SHADOW E&M-EST. PATIENT-LVL II: ICD-10-PCS | Mod: PBBFAC,,, | Performed by: NURSE PRACTITIONER

## 2019-05-14 PROCEDURE — 99215 PR OFFICE/OUTPT VISIT, EST, LEVL V, 40-54 MIN: ICD-10-PCS | Mod: S$PBB,,, | Performed by: NURSE PRACTITIONER

## 2019-05-14 RX ORDER — OXYCODONE AND ACETAMINOPHEN 5; 325 MG/1; MG/1
1 TABLET ORAL 2 TIMES DAILY PRN
COMMUNITY
End: 2020-04-29

## 2019-05-14 RX ORDER — LAMOTRIGINE 100 MG/1
150 TABLET ORAL DAILY
Qty: 90 TABLET | Refills: 1 | Status: SHIPPED | OUTPATIENT
Start: 2019-05-14 | End: 2019-07-29 | Stop reason: SDUPTHER

## 2019-05-14 RX ORDER — TRAZODONE HYDROCHLORIDE 100 MG/1
200 TABLET ORAL NIGHTLY
COMMUNITY
End: 2019-05-14

## 2019-05-14 RX ORDER — DULOXETIN HYDROCHLORIDE 60 MG/1
60 CAPSULE, DELAYED RELEASE ORAL DAILY
Qty: 30 CAPSULE | Refills: 2 | Status: SHIPPED | OUTPATIENT
Start: 2019-05-14 | End: 2019-10-29 | Stop reason: SDUPTHER

## 2019-05-14 RX ORDER — DULOXETIN HYDROCHLORIDE 60 MG/1
60 CAPSULE, DELAYED RELEASE ORAL DAILY
Qty: 30 CAPSULE | Refills: 2 | Status: SHIPPED | OUTPATIENT
Start: 2019-05-14 | End: 2019-05-14 | Stop reason: SDUPTHER

## 2019-05-14 RX ORDER — LAMOTRIGINE 100 MG/1
150 TABLET ORAL DAILY
Qty: 90 TABLET | Refills: 1 | Status: SHIPPED | OUTPATIENT
Start: 2019-05-14 | End: 2019-05-14 | Stop reason: SDUPTHER
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
## 2019-06-14 ENCOUNTER — OFFICE VISIT (OUTPATIENT)
Dept: PSYCHIATRY | Facility: CLINIC | Age: 64
End: 2019-06-14
Payer: MEDICARE

## 2019-06-14 VITALS
BODY MASS INDEX: 41.61 KG/M2 | WEIGHT: 227.5 LBS | SYSTOLIC BLOOD PRESSURE: 136 MMHG | HEART RATE: 103 BPM | DIASTOLIC BLOOD PRESSURE: 84 MMHG

## 2019-06-14 DIAGNOSIS — F31.9 BIPOLAR 1 DISORDER: ICD-10-CM

## 2019-06-14 DIAGNOSIS — F43.10 PTSD (POST-TRAUMATIC STRESS DISORDER): ICD-10-CM

## 2019-06-14 DIAGNOSIS — F33.2 SEVERE EPISODE OF RECURRENT MAJOR DEPRESSIVE DISORDER, WITHOUT PSYCHOTIC FEATURES: Primary | ICD-10-CM

## 2019-06-14 DIAGNOSIS — F41.9 ANXIETY: ICD-10-CM

## 2019-06-14 PROCEDURE — 99999 PR PBB SHADOW E&M-EST. PATIENT-LVL II: ICD-10-PCS | Mod: PBBFAC,,, | Performed by: NURSE PRACTITIONER

## 2019-06-14 PROCEDURE — 99212 OFFICE O/P EST SF 10 MIN: CPT | Mod: S$PBB,,, | Performed by: NURSE PRACTITIONER

## 2019-06-14 PROCEDURE — 99999 PR PBB SHADOW E&M-EST. PATIENT-LVL II: CPT | Mod: PBBFAC,,, | Performed by: NURSE PRACTITIONER

## 2019-06-14 PROCEDURE — 99212 OFFICE O/P EST SF 10 MIN: CPT | Mod: PBBFAC | Performed by: NURSE PRACTITIONER

## 2019-06-14 PROCEDURE — 99212 PR OFFICE/OUTPT VISIT, EST, LEVL II, 10-19 MIN: ICD-10-PCS | Mod: S$PBB,,, | Performed by: NURSE PRACTITIONER
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
## 2019-06-28 ENCOUNTER — TELEPHONE (OUTPATIENT)
Dept: PRIMARY CARE CLINIC | Facility: CLINIC | Age: 64
End: 2019-06-28

## 2019-06-28 NOTE — TELEPHONE ENCOUNTER
Patient called requesting to reschedule her lab and appointment with Dr. Mcconnell. Appointments rescheduled. Patient verbalized understanding.

## 2019-07-29 DIAGNOSIS — E78.5 HYPERLIPIDEMIA, UNSPECIFIED HYPERLIPIDEMIA TYPE: ICD-10-CM

## 2019-07-29 DIAGNOSIS — F33.2 SEVERE EPISODE OF RECURRENT MAJOR DEPRESSIVE DISORDER, WITHOUT PSYCHOTIC FEATURES: ICD-10-CM

## 2019-07-29 RX ORDER — SIMVASTATIN 40 MG/1
40 TABLET, FILM COATED ORAL DAILY
Qty: 90 TABLET | Refills: 1 | Status: SHIPPED | OUTPATIENT
Start: 2019-07-29 | End: 2019-07-30 | Stop reason: SDUPTHER

## 2019-07-29 RX ORDER — LAMOTRIGINE 100 MG/1
150 TABLET ORAL DAILY
Qty: 90 TABLET | Refills: 1 | Status: SHIPPED | OUTPATIENT
Start: 2019-07-29 | End: 2019-07-30 | Stop reason: SDUPTHER

## 2019-07-29 NOTE — TELEPHONE ENCOUNTER
----- Message from Lexi Zelaya sent at 7/29/2019  2:10 PM CDT -----  Contact: Patient  Type:  RX Refill Request    Who Called:  Joanne, patient  Refill or New Rx:  Refill  RX Name and Strength:  simvastatin (ZOCOR) 40 MG tablet  How is the patient currently taking it? (ex. 1XDay):  Take 1 tablet (40 mg total) by mouth once daily  Is this a 30 day or 90 day RX:  90  Preferred Pharmacy with phone number:    [facility] [place] [place] - [address] [facility]  [address] [facility] [address] [place] [address]  Phone: [phone] Fax: [phone]  Local or Mail Order:  Local  Ordering Provider:  Dr Enedina Dunbar Call Back Number:  [phone]  Additional Information:  Please see second request.    Type:  RX Refill Request    Refill or New Rx:  Refill  RX Name and Strength:  lamoTRIgine (LAMICTAL) 100 MG tablet  How is the patient currently taking it? (ex. 1XDay):   Take 1.5 tablets (150 mg total) by mouth once daily.  Is this a 30 day or 90 day RX:  90  Additional Information:  Please advise. Thanks.

## 2019-07-30 DIAGNOSIS — F33.2 SEVERE EPISODE OF RECURRENT MAJOR DEPRESSIVE DISORDER, WITHOUT PSYCHOTIC FEATURES: ICD-10-CM

## 2019-07-30 DIAGNOSIS — E78.5 HYPERLIPIDEMIA, UNSPECIFIED HYPERLIPIDEMIA TYPE: ICD-10-CM

## 2019-07-30 RX ORDER — LAMOTRIGINE 100 MG/1
150 TABLET ORAL DAILY
Qty: 135 TABLET | Refills: 1 | Status: SHIPPED | OUTPATIENT
Start: 2019-07-30 | End: 2019-10-29 | Stop reason: SDUPTHER

## 2019-07-30 RX ORDER — SIMVASTATIN 40 MG/1
40 TABLET, FILM COATED ORAL DAILY
Qty: 90 TABLET | Refills: 1 | Status: SHIPPED | OUTPATIENT
Start: 2019-07-30 | End: 2020-02-18 | Stop reason: SDUPTHER

## 2019-07-30 NOTE — TELEPHONE ENCOUNTER
Attempted to call patient, no answer. Phone just kept ringing. Unable to speak to patient or let her know her meds were at the pharmacy

## 2019-07-30 NOTE — TELEPHONE ENCOUNTER
----- Message from Lexi Zelaya sent at 7/30/2019  9:28 AM CDT -----  Contact: Patient  Type: Needs Medical Advice    Who Called:  Joanne, patient  Symptoms (please be specific):  N/A  How long has patient had these symptoms:  N/A  Pharmacy name and phone #:    [facility] [place] [place] - [address]  [address]  [facility] [address] [facility] [place] [address]  Phone: [phone] Fax: [phone]  Best Call Back Number: [phone]  Additional Information: Calling because prescriptions need to go to [facility] [place] - [address] not [facility]. Thanks.

## 2019-08-08 ENCOUNTER — PATIENT OUTREACH (OUTPATIENT)
Dept: ADMINISTRATIVE | Facility: HOSPITAL | Age: 64
End: 2019-08-08

## 2019-08-09 NOTE — PROGRESS NOTES
Immunizations reviewed. Legacy reviewed. Care Everywhere reviewed. Pre-visit chart review completed.

## 2019-08-15 PROBLEM — E11.69 TYPE 2 DIABETES MELLITUS WITH HYPERLIPIDEMIA: Status: ACTIVE | Noted: 2018-09-17

## 2019-08-15 PROBLEM — E78.5 TYPE 2 DIABETES MELLITUS WITH HYPERLIPIDEMIA: Status: ACTIVE | Noted: 2018-09-17

## 2019-08-22 ENCOUNTER — OFFICE VISIT (OUTPATIENT)
Dept: PRIMARY CARE CLINIC | Facility: CLINIC | Age: 64
End: 2019-08-22
Payer: MEDICARE

## 2019-08-22 VITALS
OXYGEN SATURATION: 98 % | TEMPERATURE: 98 F | HEART RATE: 109 BPM | WEIGHT: 236 LBS | DIASTOLIC BLOOD PRESSURE: 81 MMHG | RESPIRATION RATE: 18 BRPM | BODY MASS INDEX: 43.43 KG/M2 | HEIGHT: 62 IN | SYSTOLIC BLOOD PRESSURE: 137 MMHG

## 2019-08-22 DIAGNOSIS — E66.01 MORBID OBESITY WITH BMI OF 40.0-44.9, ADULT: ICD-10-CM

## 2019-08-22 DIAGNOSIS — Z23 NEED FOR VACCINATION: ICD-10-CM

## 2019-08-22 DIAGNOSIS — I10 ESSENTIAL HYPERTENSION, BENIGN: ICD-10-CM

## 2019-08-22 DIAGNOSIS — E78.2 MIXED HYPERLIPIDEMIA: ICD-10-CM

## 2019-08-22 DIAGNOSIS — E11.69 TYPE 2 DIABETES MELLITUS WITH HYPERLIPIDEMIA: Primary | ICD-10-CM

## 2019-08-22 DIAGNOSIS — E78.5 TYPE 2 DIABETES MELLITUS WITH HYPERLIPIDEMIA: Primary | ICD-10-CM

## 2019-08-22 PROCEDURE — 99999 PR PBB SHADOW E&M-EST. PATIENT-LVL III: ICD-10-PCS | Mod: PBBFAC,,, | Performed by: FAMILY MEDICINE

## 2019-08-22 PROCEDURE — 99999 PR PBB SHADOW E&M-EST. PATIENT-LVL III: CPT | Mod: PBBFAC,,, | Performed by: FAMILY MEDICINE

## 2019-08-22 PROCEDURE — 99214 PR OFFICE/OUTPT VISIT, EST, LEVL IV, 30-39 MIN: ICD-10-PCS | Mod: S$PBB,,, | Performed by: FAMILY MEDICINE

## 2019-08-22 PROCEDURE — 90686 IIV4 VACC NO PRSV 0.5 ML IM: CPT | Mod: PBBFAC,PN

## 2019-08-22 PROCEDURE — 99214 OFFICE O/P EST MOD 30 MIN: CPT | Mod: S$PBB,,, | Performed by: FAMILY MEDICINE

## 2019-08-22 PROCEDURE — 99213 OFFICE O/P EST LOW 20 MIN: CPT | Mod: PBBFAC,PN,25 | Performed by: FAMILY MEDICINE

## 2019-08-22 RX ORDER — POTASSIUM CHLORIDE 1500 MG/1
TABLET, EXTENDED RELEASE ORAL DAILY
COMMUNITY
End: 2020-09-23

## 2019-08-22 NOTE — PROGRESS NOTES
Verified pt ID using name and . Allergies verified with pt. Administered Fluarix Quadrivalent IM in L. Deltoid per physician order using aseptic technique. Aspirated and no blood return noted. Pt tolerated well with no adverse reactions noted.

## 2019-08-22 NOTE — PROGRESS NOTES
"Subjective:       Patient ID: Joanne Lino is a 64 y.o. female.    Chief Complaint: Diabetes (here to follow up on lab results )    My some most recent labs, patient does, in fact, have mild type 2 diabetes.  A1c 6.3%, but fasting glucose consistently elevated on last several lab draws.  Would prefer to avoid medication, if possible.  Has gained a significant amount of weight over the past few months due to dietary indiscretions and physical inactivity, but she is working on improving her diet and getting more regular exercise.  Compliant with other medications, no adverse side effects.    Review of Systems   Constitutional: Negative for chills, fatigue and fever.   HENT: Negative for congestion.    Eyes: Negative for visual disturbance.   Respiratory: Negative for cough and shortness of breath.    Cardiovascular: Negative for chest pain.   Gastrointestinal: Negative for abdominal pain, nausea and vomiting.   Endocrine: Negative for polydipsia and polyuria.   Genitourinary: Negative for difficulty urinating.   Musculoskeletal: Positive for arthralgias.   Skin: Negative for rash.   Neurological: Negative for dizziness.   Psychiatric/Behavioral: Positive for dysphoric mood. Negative for sleep disturbance.       Objective:      Vitals:    08/22/19 1036   BP: 137/81   BP Location: Left arm   Patient Position: Sitting   BP Method: Large (Automatic)   Pulse: 109   Resp: 18   Temp: 98.3 °F (36.8 °C)   TempSrc: Oral   SpO2: 98%   Weight: 107 kg (236 lb)   Height: 5' 2" (1.575 m)     Lab Results   Component Value Date    WBC 9.30 03/15/2019    HGB 12.7 03/15/2019    HCT 37.1 03/15/2019     03/15/2019    CHOL 193 08/15/2019    TRIG 143 08/15/2019    HDL 58 08/15/2019    ALT 16 08/15/2019    AST 19 08/15/2019     08/15/2019    K 4.0 08/15/2019    CL 98 (L) 08/15/2019    CREATININE 0.8 08/15/2019    BUN 28 (H) 08/15/2019    CO2 30 (H) 08/15/2019    TSH 1.24 03/22/2019    HGBA1C 6.3 (H) 08/15/2019     Physical " Exam   Constitutional: She is oriented to person, place, and time. She appears well-developed and well-nourished.   HENT:   Head: Normocephalic and atraumatic.   Neck: No JVD present.   Cardiovascular: Normal rate, regular rhythm and normal heart sounds.   Pulses:       Dorsalis pedis pulses are 1+ on the right side, and 1+ on the left side.   Pulmonary/Chest: Effort normal and breath sounds normal.   Musculoskeletal: She exhibits no edema.   Feet:   Right Foot:   Protective Sensation: 10 sites tested. 10 sites sensed.   Skin Integrity: Negative for ulcer, blister or skin breakdown.   Left Foot:   Protective Sensation: 10 sites tested. 10 sites sensed.   Skin Integrity: Negative for ulcer, blister or skin breakdown.   Neurological: She is alert and oriented to person, place, and time.   Skin: Skin is warm and dry.   Nursing note and vitals reviewed.      Assessment:       1. Type 2 diabetes mellitus with hyperlipidemia    2. Mixed hyperlipidemia    3. Essential hypertension, benign    4. Morbid obesity with BMI of 40.0-44.9, adult    5. Need for vaccination        Plan:       Type 2 diabetes mellitus with hyperlipidemia  -     Basic metabolic panel; Future; Expected date: 11/22/2019  -     Hemoglobin A1c; Future; Expected date: 11/22/2019  -      DIABETES FOOT EXAM  Patient prefers to attempt to control via dietary modification 1st.  Stressed importance of reducing carbohydrate intake.  Mixed hyperlipidemia  Well controlled on current regimen  Essential hypertension, benign  Stable on current meds  Morbid obesity with BMI of 40.0-44.9, adult  See above  Need for vaccination  -     varicella-zoster gE-AS01B, PF, (SHINGRIX, PF,) 50 mcg/0.5 mL injection; Inject 0.5 mLs into the muscle once. for 1 dose  Dispense: 0.5 mL; Refill: 0  -     Influenza - Quadrivalent (6 months+) (PF)      Medication List with Changes/Refills   New Medications    VARICELLA-ZOSTER GE-AS01B, PF, (SHINGRIX, PF,) 50 MCG/0.5 ML INJECTION     Inject 0.5 mLs into the muscle once. for 1 dose   Current Medications    B COMPLEX VITAMINS TABLET    Take 1 tablet by mouth once daily.    CALCIUM CARBONATE (CALCIUM 500 ORAL)    Take 1 tablet by mouth once daily.     CLONAZEPAM (KLONOPIN) 1 MG TABLET    Take 1 tablet (1 mg total) by mouth 2 (two) times daily as needed for Anxiety.    DULOXETINE (CYMBALTA) 60 MG CAPSULE    Take 1 capsule (60 mg total) by mouth once daily.    LAMOTRIGINE (LAMICTAL) 100 MG TABLET    Take 1.5 tablets (150 mg total) by mouth once daily.    MULTIVITAMIN CAPSULE    Take 1 capsule by mouth once daily.    OXYCODONE-ACETAMINOPHEN (PERCOCET) 5-325 MG PER TABLET    Take 1 tablet by mouth 2 (two) times daily as needed.    POTASSIUM CHLORIDE (K-TAB) 20 MEQ    Take by mouth once daily.    SIMVASTATIN (ZOCOR) 40 MG TABLET    Take 1 tablet (40 mg total) by mouth once daily.    TELMISARTAN-HYDROCHLOROTHIAZIDE (MICARDIS HCT) 80-25 MG PER TABLET    Take 1 tablet by mouth once daily.    VITAMIN E 600 UNIT CAPSULE    Take 600 Units by mouth once daily.   Discontinued Medications    HYDROCORTISONE 2.5 % OINTMENT    Apply topically 2 (two) times daily as needed.

## 2019-08-28 ENCOUNTER — DOCUMENTATION ONLY (OUTPATIENT)
Dept: PSYCHIATRY | Facility: HOSPITAL | Age: 64
End: 2019-08-28
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
## 2019-08-29 ENCOUNTER — DOCUMENTATION ONLY (OUTPATIENT)
Dept: PSYCHIATRY | Facility: HOSPITAL | Age: 64
End: 2019-08-29

## 2019-08-29 NOTE — PSYCH
SW and pt discussed insurance coverage and possible BMU start dates. Pt reported that she has a scheduling conflict with transportation. Pt reported that she will discuss w son prior to scheduling BMU start date.

## 2019-10-17 ENCOUNTER — OFFICE VISIT (OUTPATIENT)
Dept: OTOLARYNGOLOGY | Facility: CLINIC | Age: 64
End: 2019-10-17
Payer: MEDICARE

## 2019-10-17 VITALS
SYSTOLIC BLOOD PRESSURE: 152 MMHG | DIASTOLIC BLOOD PRESSURE: 88 MMHG | HEART RATE: 90 BPM | HEIGHT: 62 IN | WEIGHT: 237 LBS | TEMPERATURE: 99 F | BODY MASS INDEX: 43.61 KG/M2

## 2019-10-17 DIAGNOSIS — E66.01 MORBID OBESITY WITH BMI OF 40.0-44.9, ADULT: ICD-10-CM

## 2019-10-17 DIAGNOSIS — R06.83 SNORING: Primary | ICD-10-CM

## 2019-10-17 PROCEDURE — 99204 OFFICE O/P NEW MOD 45 MIN: CPT | Mod: S$GLB,,, | Performed by: OTOLARYNGOLOGY

## 2019-10-17 PROCEDURE — 99204 PR OFFICE/OUTPT VISIT, NEW, LEVL IV, 45-59 MIN: ICD-10-PCS | Mod: S$GLB,,, | Performed by: OTOLARYNGOLOGY

## 2019-10-17 RX ORDER — FLUTICASONE PROPIONATE 50 MCG
SPRAY, SUSPENSION (ML) NASAL
Qty: 15.8 ML | Refills: 2 | Status: SHIPPED | OUTPATIENT
Start: 2019-10-17 | End: 2020-04-29

## 2019-10-17 RX ORDER — TITANIUM DIOXIDE, OCTINOXATE, ZINC OXIDE 4.61; 1.6; .78 G/40ML; G/40ML; G/40ML
CREAM TOPICAL DAILY
COMMUNITY
End: 2020-09-23

## 2019-10-17 NOTE — PATIENT INSTRUCTIONS
Use fluticasone nasal spray 2 sprays in each nostril every evening.  Elevate head as tolerated when sleeping.  Work on weight loss.  Minimize alcohol.  Follow up in 6 weeks.

## 2019-10-17 NOTE — PROGRESS NOTES
Subjective:       Patient ID: Joanne Lino is a 64 y.o. female.    Chief Complaint: Snoring (about every 2 weeks when in deep sleep)    She is a new patient for me here today for evaluation of snoring.  States occurs about every 2 weeks when in a deep sleep for many years.  Not sure if alcohol the night before or any associations.  Denies sleeping pills.  Not aware of it, but disturbs her  when it occurs.  Denies restless sleep, awakening choking or gasping or short of breath, morning headache, daytime fatigue.   has not reported apneic spells or breathing struggles during sleep.  Denies nasal congestion.  Not sure if any mouth breathing during sleep.  Denies GERD or LPR symptoms or awakening with sour taste or burning.  No pets.  No AR symptoms.  No nasal trauma or surgery.  Has had problems with her weight since having her son 32 years ago, and more recent weight gain after back surgery with decreased mobility.  However feeling better / recovering with better ambulation and increasing activity and working on weight loss again.          Review of Systems   Ears: Negative for hearing loss, ear pain, ear pressure, ringing in ear, ear discharge, ear infections, dizziness, head trauma and family history of hearing loss.    Nose:  Negative for nosebleeds, nasal obstruction, nasal or sinus surgery, loss of smell and postnasal drip.    Mouth/Throat: Negative for pain swallowing, impaired swallowing, hoarse voice, throat mass and neck mass.   Constitutional: Negative for recent unexplained weight loss and fever.    Eyes:  Negative for history of glaucoma and visual change.   Cardiovascular:  Positive for history of high blood pressure. Negative for chest pain and palpitations.   Respiratory:  Negative for asthma, emphysema, history of tuberculosis, recent cough and shortness of breath.    Gastrointestinal:  Negative for history of stomach ulcers or pain, acid reflux, indigestion and blood in stool.    Other:  Positive for kidney problem, arthritis and depression. Negative for slurred, swollen glands and anemia.           Objective:        Vitals:    10/17/19 0944   BP: (!) 152/88   Pulse: 90   Temp: 98.5 °F (36.9 °C)     Body mass index is 43.35 kg/m².  Physical Exam   Constitutional: She is oriented to person, place, and time. She appears well-developed and well-nourished. No distress.   HENT:   Head: Normocephalic and atraumatic.   Right Ear: Tympanic membrane, external ear and ear canal normal.   Left Ear: Tympanic membrane, external ear and ear canal normal.   Nose: No mucosal edema, rhinorrhea or nasal deformity. No epistaxis.   Mouth/Throat: Oropharynx is clear and moist and mucous membranes are normal. No oral lesions. No uvula swelling. No oropharyngeal exudate, posterior oropharyngeal edema or posterior oropharyngeal erythema.   Bifid uvula.  Buccal excoriations at occlusal line bilaterally. Left mandibular gingivobuccal sulcus erythema status post recent dental work.   Neck: Phonation normal. Neck supple. No tracheal deviation present.   Pulmonary/Chest: Effort normal. No respiratory distress.   Lymphadenopathy:     She has no cervical adenopathy.   Neurological: She is alert and oriented to person, place, and time.   Skin: Skin is warm and dry.   Psychiatric: She has a normal mood and affect. Her behavior is normal. Her speech is not slurred.       Tests / Results:    Most recent TSH within normal limits in March 2019.        Assessment:       1. Snoring    2. Morbid obesity with BMI of 40.0-44.9, adult        Plan:       Reviewed above and considerations and recommendations and answered questions.  Use fluticasone nasal spray 2 sprays in each nostril every evening.  Elevate head as tolerated when sleeping.  Work on weight loss.  Minimize alcohol.  Follow up in 6 weeks.

## 2019-10-24 ENCOUNTER — OFFICE VISIT (OUTPATIENT)
Dept: DERMATOLOGY | Facility: CLINIC | Age: 64
End: 2019-10-24
Payer: MEDICARE

## 2019-10-24 VITALS — WEIGHT: 237 LBS | BODY MASS INDEX: 43.35 KG/M2

## 2019-10-24 DIAGNOSIS — D22.9 NEVUS OF MULTIPLE SITES: Primary | ICD-10-CM

## 2019-10-24 DIAGNOSIS — L82.1 SEBORRHEIC KERATOSES: ICD-10-CM

## 2019-10-24 DIAGNOSIS — L73.8 SEBACEOUS GLAND HYPERPLASIA: ICD-10-CM

## 2019-10-24 DIAGNOSIS — L91.8 CUTANEOUS SKIN TAGS: ICD-10-CM

## 2019-10-24 PROCEDURE — 99202 PR OFFICE/OUTPT VISIT, NEW, LEVL II, 15-29 MIN: ICD-10-PCS | Mod: S$PBB,,, | Performed by: DERMATOLOGY

## 2019-10-24 PROCEDURE — 99999 PR PBB SHADOW E&M-EST. PATIENT-LVL II: CPT | Mod: PBBFAC,,, | Performed by: DERMATOLOGY

## 2019-10-24 PROCEDURE — 99999 PR PBB SHADOW E&M-EST. PATIENT-LVL II: ICD-10-PCS | Mod: PBBFAC,,, | Performed by: DERMATOLOGY

## 2019-10-24 PROCEDURE — 99212 OFFICE O/P EST SF 10 MIN: CPT | Mod: PBBFAC,PO | Performed by: DERMATOLOGY

## 2019-10-24 PROCEDURE — 99202 OFFICE O/P NEW SF 15 MIN: CPT | Mod: S$PBB,,, | Performed by: DERMATOLOGY

## 2019-10-24 NOTE — PROGRESS NOTES
Subjective:       Patient ID:  Joanne Lino is a 64 y.o. female who presents for   Chief Complaint   Patient presents with    Lesion     face     Lesion  - Initial  Affected locations: face  Signs / symptoms: asymptomatic  Aggravated by: nothing  Relieving factors/Treatments tried: nothing        Review of Systems   Constitutional: Negative for fever, chills, weight loss, weight gain, fatigue, night sweats and malaise.   Skin: Positive for daily sunscreen use, activity-related sunscreen use and wears hat.   Hematologic/Lymphatic: Does not bruise/bleed easily.        Objective:    Physical Exam   Constitutional: She appears well-developed and well-nourished.   Neurological: She is alert and oriented to person, place, and time.   Psychiatric: She has a normal mood and affect.   Skin:   Areas Examined (abnormalities noted in diagram):   Head / Face Inspection Performed  Neck Inspection Performed  Abdomen Inspection Performed  RUE Inspected  LUE Inspection Performed  Nails and Digits Inspection Performed                   Diagram Legend     Erythematous scaling macule/papule c/w actinic keratosis       Vascular papule c/w angioma      Pigmented verrucoid papule/plaque c/w seborrheic keratosis      Yellow umbilicated papule c/w sebaceous hyperplasia      Irregularly shaped tan macule c/w lentigo     1-2 mm smooth white papules consistent with Milia      Movable subcutaneous cyst with punctum c/w epidermal inclusion cyst      Subcutaneous movable cyst c/w pilar cyst      Firm pink to brown papule c/w dermatofibroma      Pedunculated fleshy papule(s) c/w skin tag(s)      Evenly pigmented macule c/w junctional nevus     Mildly variegated pigmented, slightly irregular-bordered macule c/w mildly atypical nevus      Flesh colored to evenly pigmented papule c/w intradermal nevus       Pink pearly papule/plaque c/w basal cell carcinoma      Erythematous hyperkeratotic cursted plaque c/w SCC      Surgical scar with no sign  "of skin cancer recurrence      Open and closed comedones      Inflammatory papules and pustules      Verrucoid papule consistent consistent with wart     Erythematous eczematous patches and plaques     Dystrophic onycholytic nail with subungual debris c/w onychomycosis     Umbilicated papule    Erythematous-base heme-crusted tan verrucoid plaque consistent with inflamed seborrheic keratosis     Erythematous Silvery Scaling Plaque c/w Psoriasis     See annotation      Assessment / Plan:        Nevus of multiple sites  The "ABCD" rules to observe pigmented lesions were reviewed.  Brochure provided      Sebaceous gland hyperplasia  reassurance      Cutaneous skin tags  reassurance      Seborrheic keratoses  Brochure provided               Follow up in about 1 year (around 10/24/2020).  "

## 2019-10-29 ENCOUNTER — OFFICE VISIT (OUTPATIENT)
Dept: PSYCHIATRY | Facility: CLINIC | Age: 64
End: 2019-10-29
Payer: MEDICARE

## 2019-10-29 VITALS
DIASTOLIC BLOOD PRESSURE: 64 MMHG | BODY MASS INDEX: 43.35 KG/M2 | WEIGHT: 237 LBS | SYSTOLIC BLOOD PRESSURE: 117 MMHG | HEART RATE: 80 BPM

## 2019-10-29 DIAGNOSIS — F41.9 ANXIETY: ICD-10-CM

## 2019-10-29 DIAGNOSIS — I10 ESSENTIAL HYPERTENSION, BENIGN: ICD-10-CM

## 2019-10-29 DIAGNOSIS — F33.0 MDD (MAJOR DEPRESSIVE DISORDER), RECURRENT EPISODE, MILD: ICD-10-CM

## 2019-10-29 DIAGNOSIS — F43.10 PTSD (POST-TRAUMATIC STRESS DISORDER): Primary | ICD-10-CM

## 2019-10-29 PROCEDURE — 99212 OFFICE O/P EST SF 10 MIN: CPT | Mod: PBBFAC | Performed by: PSYCHIATRY & NEUROLOGY

## 2019-10-29 PROCEDURE — 99214 OFFICE O/P EST MOD 30 MIN: CPT | Mod: S$PBB,,, | Performed by: PSYCHIATRY & NEUROLOGY

## 2019-10-29 PROCEDURE — 99214 PR OFFICE/OUTPT VISIT, EST, LEVL IV, 30-39 MIN: ICD-10-PCS | Mod: S$PBB,,, | Performed by: PSYCHIATRY & NEUROLOGY

## 2019-10-29 PROCEDURE — 99999 PR PBB SHADOW E&M-EST. PATIENT-LVL II: ICD-10-PCS | Mod: PBBFAC,,, | Performed by: PSYCHIATRY & NEUROLOGY

## 2019-10-29 PROCEDURE — 99999 PR PBB SHADOW E&M-EST. PATIENT-LVL II: CPT | Mod: PBBFAC,,, | Performed by: PSYCHIATRY & NEUROLOGY

## 2019-10-29 RX ORDER — CLONAZEPAM 1 MG/1
1 TABLET ORAL 2 TIMES DAILY PRN
Qty: 60 TABLET | Refills: 1 | Status: SHIPPED | OUTPATIENT
Start: 2019-10-29 | End: 2020-02-04 | Stop reason: SDUPTHER

## 2019-10-29 RX ORDER — DULOXETIN HYDROCHLORIDE 60 MG/1
60 CAPSULE, DELAYED RELEASE ORAL DAILY
Qty: 30 CAPSULE | Refills: 0 | Status: SHIPPED | OUTPATIENT
Start: 2019-10-29 | End: 2020-02-04 | Stop reason: SDUPTHER

## 2019-10-29 RX ORDER — TELMISARTAN AND HYDROCHLORTHIAZIDE 80; 25 MG/1; MG/1
1 TABLET ORAL DAILY
Qty: 90 TABLET | Refills: 1 | Status: SHIPPED | OUTPATIENT
Start: 2019-10-29 | End: 2019-11-04 | Stop reason: SDUPTHER

## 2019-10-29 RX ORDER — LAMOTRIGINE 100 MG/1
150 TABLET ORAL DAILY
Qty: 135 TABLET | Refills: 0 | Status: SHIPPED | OUTPATIENT
Start: 2019-10-29 | End: 2020-02-04 | Stop reason: SDUPTHER
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
## 2019-10-29 NOTE — TELEPHONE ENCOUNTER
----- Message from Minal Rouse sent at 10/29/2019  1:07 PM CDT -----  Contact: self/[phone]  Patient is calling for an RX refill or new RX.  Is this a refill or new RX:  Refill 1  RX name and strength:telmisartan-hydrochlorothiazide (MICARDIS HCT) 80-25 mg per tablet   Directions (copy/paste from chart):    Is this a 30 day or 90 day RX:    Local pharmacy or mail order pharmacy:  Mail order  Pharmacy name and phone # (copy/paste from chart): [facility] [place] - [address] [facility] [phone] (Phone)  [phone] (Fax)    Comments:

## 2019-11-04 DIAGNOSIS — I10 ESSENTIAL HYPERTENSION, BENIGN: ICD-10-CM

## 2019-11-04 RX ORDER — TELMISARTAN AND HYDROCHLORTHIAZIDE 80; 25 MG/1; MG/1
1 TABLET ORAL DAILY
Qty: 90 TABLET | Refills: 1 | Status: SHIPPED | OUTPATIENT
Start: 2019-11-04 | End: 2019-11-05 | Stop reason: SDUPTHER

## 2019-11-04 NOTE — TELEPHONE ENCOUNTER
----- Message from Lexi Zelaya sent at 11/4/2019 10:21 AM CST -----  Contact: Patient  Type: Needs Medical Advice    Who Called:  Joanne, patient  Symptoms (please be specific):  N/A  How long has patient had these symptoms:  N/A  Pharmacy name and phone #:    [id number] - [place], [place] - [address] [facility] [place] [facility] [place] - [address]  [address]  [place] [address]  Phone: [phone] Fax: [phone]  Best Call Back Number: [phone]  Additional Information: Please send Rx telmisartan-hydrochlorothiazide (MICARDIS HCT) 80-25 mg per tablet to the pharmacy. Thanks.

## 2019-11-05 DIAGNOSIS — I10 ESSENTIAL HYPERTENSION, BENIGN: ICD-10-CM

## 2019-11-05 RX ORDER — TELMISARTAN AND HYDROCHLORTHIAZIDE 80; 25 MG/1; MG/1
1 TABLET ORAL DAILY
Qty: 90 TABLET | Refills: 1 | Status: SHIPPED | OUTPATIENT
Start: 2019-11-05 | End: 2020-04-29 | Stop reason: SDUPTHER

## 2019-11-05 NOTE — TELEPHONE ENCOUNTER
----- Message from Sheryl Romeo sent at 11/5/2019  9:03 AM CST -----  Contact: self   Patient is calling for an RX refill or new RX.  Is this a refill or new RX:  refill  RX name and strength: telmisartan-hydrochlorothiazide (MICARDIS HCT) 80-25 mg per tablet  Directions (copy/paste from chart):  Take 1 tablet by mouth once daily  Is this a 30 day or 90 day RX:  90  Local pharmacy or mail order pharmacy:  local  Pharmacy name and phone # (copy/paste from chart):   [facility] [place] - [address] [facility] [phone] (Phone)  [phone] (Fax)  Comments:  Pt states Rx was originally sent to the wrong pharmacy. Please call and advise once it is sent to the [facility] pharmacy. Pt would also like a printed copy in case the e-scribe to [facility] does not go thru.

## 2019-11-05 NOTE — TELEPHONE ENCOUNTER
Let patient know to call pharmacy she wants it to go to have it transferred. States understanding

## 2019-11-26 ENCOUNTER — PATIENT MESSAGE (OUTPATIENT)
Dept: ADMINISTRATIVE | Facility: OTHER | Age: 64
End: 2019-11-26

## 2020-02-04 ENCOUNTER — OFFICE VISIT (OUTPATIENT)
Dept: PSYCHIATRY | Facility: CLINIC | Age: 65
End: 2020-02-04
Payer: MEDICARE

## 2020-02-04 VITALS
SYSTOLIC BLOOD PRESSURE: 138 MMHG | BODY MASS INDEX: 43.35 KG/M2 | DIASTOLIC BLOOD PRESSURE: 83 MMHG | HEART RATE: 113 BPM | WEIGHT: 237 LBS

## 2020-02-04 DIAGNOSIS — F43.10 PTSD (POST-TRAUMATIC STRESS DISORDER): Primary | ICD-10-CM

## 2020-02-04 DIAGNOSIS — F33.1 MODERATE EPISODE OF RECURRENT MAJOR DEPRESSIVE DISORDER: ICD-10-CM

## 2020-02-04 DIAGNOSIS — E66.01 MORBID OBESITY WITH BMI OF 40.0-44.9, ADULT: ICD-10-CM

## 2020-02-04 DIAGNOSIS — F33.0 MDD (MAJOR DEPRESSIVE DISORDER), RECURRENT EPISODE, MILD: ICD-10-CM

## 2020-02-04 DIAGNOSIS — E11.9 TYPE 2 DIABETES MELLITUS WITHOUT COMPLICATION, UNSPECIFIED WHETHER LONG TERM INSULIN USE: ICD-10-CM

## 2020-02-04 PROCEDURE — 99999 PR PBB SHADOW E&M-EST. PATIENT-LVL III: ICD-10-PCS | Mod: PBBFAC,,, | Performed by: NURSE PRACTITIONER

## 2020-02-04 PROCEDURE — 99213 OFFICE O/P EST LOW 20 MIN: CPT | Mod: PBBFAC | Performed by: NURSE PRACTITIONER

## 2020-02-04 PROCEDURE — 99213 OFFICE O/P EST LOW 20 MIN: CPT | Mod: S$PBB,,, | Performed by: NURSE PRACTITIONER

## 2020-02-04 PROCEDURE — 99213 PR OFFICE/OUTPT VISIT, EST, LEVL III, 20-29 MIN: ICD-10-PCS | Mod: S$PBB,,, | Performed by: NURSE PRACTITIONER

## 2020-02-04 PROCEDURE — 99999 PR PBB SHADOW E&M-EST. PATIENT-LVL III: CPT | Mod: PBBFAC,,, | Performed by: NURSE PRACTITIONER

## 2020-02-04 RX ORDER — DULOXETIN HYDROCHLORIDE 60 MG/1
60 CAPSULE, DELAYED RELEASE ORAL DAILY
Qty: 90 CAPSULE | Refills: 1 | Status: SHIPPED | OUTPATIENT
Start: 2020-02-04 | End: 2020-05-04 | Stop reason: SDUPTHER

## 2020-02-04 RX ORDER — CLONAZEPAM 1 MG/1
1 TABLET ORAL 2 TIMES DAILY PRN
Qty: 60 TABLET | Refills: 1 | Status: SHIPPED | OUTPATIENT
Start: 2020-02-04 | End: 2020-05-04 | Stop reason: SDUPTHER

## 2020-02-04 RX ORDER — LAMOTRIGINE 100 MG/1
150 TABLET ORAL DAILY
Qty: 135 TABLET | Refills: 1 | Status: SHIPPED | OUTPATIENT
Start: 2020-02-04 | End: 2020-05-04 | Stop reason: SDUPTHER
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
## 2020-02-14 ENCOUNTER — OFFICE VISIT (OUTPATIENT)
Dept: URGENT CARE | Facility: CLINIC | Age: 65
End: 2020-02-14
Payer: MEDICARE

## 2020-02-14 VITALS
WEIGHT: 230 LBS | OXYGEN SATURATION: 100 % | DIASTOLIC BLOOD PRESSURE: 83 MMHG | BODY MASS INDEX: 42.33 KG/M2 | TEMPERATURE: 97 F | HEART RATE: 114 BPM | HEIGHT: 62 IN | SYSTOLIC BLOOD PRESSURE: 150 MMHG

## 2020-02-14 DIAGNOSIS — H60.502 ACUTE OTITIS EXTERNA OF LEFT EAR, UNSPECIFIED TYPE: Primary | ICD-10-CM

## 2020-02-14 DIAGNOSIS — H93.8X2 SENSATION OF FULLNESS IN LEFT EAR: ICD-10-CM

## 2020-02-14 PROCEDURE — 99214 OFFICE O/P EST MOD 30 MIN: CPT | Mod: S$GLB,,, | Performed by: PHYSICIAN ASSISTANT

## 2020-02-14 PROCEDURE — 99214 PR OFFICE/OUTPT VISIT, EST, LEVL IV, 30-39 MIN: ICD-10-PCS | Mod: S$GLB,,, | Performed by: PHYSICIAN ASSISTANT

## 2020-02-14 RX ORDER — NEOMYCIN SULFATE, POLYMYXIN B SULFATE, HYDROCORTISONE 3.5; 10000; 1 MG/ML; [USP'U]/ML; MG/ML
3 SOLUTION/ DROPS AURICULAR (OTIC) 3 TIMES DAILY
Qty: 1 BOTTLE | Refills: 0 | Status: SHIPPED | OUTPATIENT
Start: 2020-02-14 | End: 2020-02-24

## 2020-02-14 RX ORDER — LORATADINE 10 MG/1
10 TABLET ORAL DAILY
Qty: 30 TABLET | Refills: 0 | Status: SHIPPED | OUTPATIENT
Start: 2020-02-14 | End: 2020-04-29

## 2020-02-14 NOTE — PATIENT INSTRUCTIONS
Ear drops  flonase  claritin    Follow up with primary care in 1 week if still symptomatic    Please return here or go to the Emergency Department for any concerns or worsening of condition.  If you were prescribed antibiotics, please take them to completion.  If you were prescribed a narcotic medication, do not drive or operate heavy equipment or machinery while taking these medications.  Please follow up with your primary care doctor or specialist as needed.    If you  smoke, please stop smoking.        External Ear Infection (Adult)    External otitis (also called swimmers ear) is an infection in the ear canal. It is often caused by bacteria or fungus. It can occur a few days after water gets trapped in the ear canal (from swimming or bathing). It can also occur after cleaning too deeply in the ear canal with a cotton swab or other object. Sometimes, hair care products get into the ear canal and cause this problem.  Symptoms can include pain, fever, itching, redness, drainage, or swelling of the ear canal. Temporary hearing loss may also occur.  Home care  · Do not try to clean the ear canal. This can push pus and bacteria deeper into the canal.  · Use prescribed ear drops as directed. These help reduce swelling and fight the infection. If an ear wick was placed in the ear canal, apply drops right onto the end of the wick. The wick will draw the medication into the ear canal even if it is swollen closed.  · A cotton ball may be loosely placed in the outer ear to absorb any drainage.  · You may use acetaminophen or ibuprofen to control pain, unless another medication was prescribed. Note: If you have chronic liver or kidney disease or ever had a stomach ulcer or GI bleeding, talk to your health care provider before taking any of these medications.  · Do not allow water to get into your ear when bathing. Also, avoid swimming until the infection has cleared.  Prevention  · Keep your ears dry. This helps lower the  risk of infection. Dry your ears with a towel or hair dryer after getting wet. Also, use ear plugs when swimming.  · Do not stick any objects in the ear to remove wax.  · If you feel water trapped in your ear, use ear drops right away. You can get these drops over the counter at most drugstores. They work by removing water from the ear canal.  Follow-up care  Follow up with your health care provider in one week, or as advised.  When to seek medical advice  Call your health care provider right away if any of these occur:  · Ear pain becomes worse or doesnt improve after 3 days of treatment  · Redness or swelling of the outer ear occurs or gets worse  · Headache  · Painful or stiff neck  · Drowsiness or confusion  · Fever of 100.4ºF (38ºC) or higher, or as directed by your health care provider  · Seizure  Date Last Reviewed: 3/22/2015  © 7205-9683 [employer]. [address] [place] [address]. All rights reserved. This information is not intended as a substitute for professional medical care. Always follow your healthcare professional's instructions.

## 2020-02-14 NOTE — PROGRESS NOTES
"Subjective:       Patient ID: Joanne Lino is a 65 y.o. female.    Vitals:  height is 5' 2" (1.575 m) and weight is 104.3 kg (230 lb). Her oral temperature is 97.3 °F (36.3 °C). Her blood pressure is 150/83 (abnormal) and her pulse is 114 (abnormal). Her oxygen saturation is 100%.     Chief Complaint: Otalgia    65 yr old female c/o L ear pain since ysterday. States she went to water aerobics and pain started after this. She reports tenderness and some clear discharge. Pain is worsening. She denies any fevers, chills, sweats, sinus congestion or cough. She has a hx of OE per pt.     Otalgia    There is pain in the left ear. This is a new problem. The current episode started in the past 7 days (2 days ago). The problem has been gradually worsening. There has been no fever. The pain is at a severity of 9/10. The pain is severe. Associated symptoms include headaches. Pertinent negatives include no abdominal pain, coughing, diarrhea, ear discharge, hearing loss, neck pain, rash, rhinorrhea, sore throat or vomiting. Associated symptoms comments: Left facial pain. She has tried NSAIDs (trying to pop ear) for the symptoms. The treatment provided no relief. Her past medical history is significant for a chronic ear infection. There is no history of hearing loss or a tympanostomy tube.       Constitution: Negative for chills, sweating, fatigue and fever.   HENT: Negative for ear pain, ear discharge, hearing loss, congestion, sinus pain, sinus pressure, sore throat and voice change.    Neck: Negative for neck pain and painful lymph nodes.   Cardiovascular: Negative for chest pain and leg swelling.   Eyes: Negative for eye redness, double vision and blurred vision.   Respiratory: Negative for chest tightness, cough, sputum production, bloody sputum, COPD, shortness of breath, stridor, wheezing and asthma.    Gastrointestinal: Negative for abdominal pain, nausea, vomiting and diarrhea.   Genitourinary: Negative for dysuria, " frequency, urgency and history of kidney stones.   Musculoskeletal: Negative for joint pain, joint swelling, muscle cramps and muscle ache.   Skin: Negative for color change, pale, rash and bruising.   Allergic/Immunologic: Negative for seasonal allergies and asthma.   Neurological: Positive for headaches. Negative for dizziness, history of vertigo, light-headedness and passing out.   Hematologic/Lymphatic: Negative for swollen lymph nodes.   Psychiatric/Behavioral: Negative for nervous/anxious, sleep disturbance and depression. The patient is not nervous/anxious.        Objective:      Physical Exam   Constitutional: She is oriented to person, place, and time. She appears well-developed and well-nourished. She is cooperative.  Non-toxic appearance. She does not have a sickly appearance. She does not appear ill. No distress.   HENT:   Head: Normocephalic and atraumatic.   Right Ear: Hearing, tympanic membrane, external ear and ear canal normal.   Left Ear: Hearing, tympanic membrane, external ear and ear canal normal. There is tenderness. No drainage. No mastoid tenderness.  No middle ear effusion.   Nose: Nose normal. No mucosal edema, rhinorrhea or nasal deformity. No epistaxis. Right sinus exhibits no maxillary sinus tenderness and no frontal sinus tenderness. Left sinus exhibits no maxillary sinus tenderness and no frontal sinus tenderness.   Mouth/Throat: Uvula is midline, oropharynx is clear and moist and mucous membranes are normal. No trismus in the jaw. Normal dentition. No uvula swelling. No oropharyngeal exudate, posterior oropharyngeal edema or posterior oropharyngeal erythema.   TTP on mvmt of auricle, tragus  Tenderness on otoscopic exam  Mild swelling  No drainage visualized   Eyes: Conjunctivae and lids are normal. No scleral icterus.   Neck: Trachea normal, full passive range of motion without pain and phonation normal. Neck supple. No neck rigidity. No edema and no erythema present.    Cardiovascular: Normal rate, regular rhythm, normal heart sounds, intact distal pulses and normal pulses.   Pulmonary/Chest: Effort normal and breath sounds normal. No respiratory distress. She has no decreased breath sounds. She has no rhonchi.   Abdominal: Normal appearance.   Musculoskeletal: Normal range of motion. She exhibits no edema or deformity.   Neurological: She is alert and oriented to person, place, and time. She exhibits normal muscle tone. Coordination normal.   Skin: Skin is warm, dry, intact, not diaphoretic and not pale.   Psychiatric: She has a normal mood and affect. Her speech is normal and behavior is normal. Judgment and thought content normal. Cognition and memory are normal.   Nursing note and vitals reviewed.        Assessment:       1. Acute otitis externa of left ear, unspecified type    2. Sensation of fullness in left ear        Plan:       Signs and symptoms of OE  No drainage on exam. Mild swelling and tenderness.   Will tx with cortisporin, however pt should f/u with ENT 1 wk if no improvement    Labs reviewed, pertinent imaging reviewed, previous medical records, medical history, surgical history, social history, family history reviewed.      Acute otitis externa of left ear, unspecified type  -     neomycin-polymyxin-hydrocortisone (CORTISPORIN) otic solution; Place 3 drops into the left ear 3 (three) times daily. for 10 days  Dispense: 1 Bottle; Refill: 0    Sensation of fullness in left ear  -     loratadine (CLARITIN) 10 mg tablet; Take 1 tablet (10 mg total) by mouth once daily.  Dispense: 30 tablet; Refill: 0         Patient Instructions   Ear drops  flonase  claritin    Follow up with primary care in 1 week if still symptomatic    Please return here or go to the Emergency Department for any concerns or worsening of condition.  If you were prescribed antibiotics, please take them to completion.  If you were prescribed a narcotic medication, do not drive or operate heavy  equipment or machinery while taking these medications.  Please follow up with your primary care doctor or specialist as needed.    If you  smoke, please stop smoking.        External Ear Infection (Adult)    External otitis (also called swimmers ear) is an infection in the ear canal. It is often caused by bacteria or fungus. It can occur a few days after water gets trapped in the ear canal (from swimming or bathing). It can also occur after cleaning too deeply in the ear canal with a cotton swab or other object. Sometimes, hair care products get into the ear canal and cause this problem.  Symptoms can include pain, fever, itching, redness, drainage, or swelling of the ear canal. Temporary hearing loss may also occur.  Home care  · Do not try to clean the ear canal. This can push pus and bacteria deeper into the canal.  · Use prescribed ear drops as directed. These help reduce swelling and fight the infection. If an ear wick was placed in the ear canal, apply drops right onto the end of the wick. The wick will draw the medication into the ear canal even if it is swollen closed.  · A cotton ball may be loosely placed in the outer ear to absorb any drainage.  · You may use acetaminophen or ibuprofen to control pain, unless another medication was prescribed. Note: If you have chronic liver or kidney disease or ever had a stomach ulcer or GI bleeding, talk to your health care provider before taking any of these medications.  · Do not allow water to get into your ear when bathing. Also, avoid swimming until the infection has cleared.  Prevention  · Keep your ears dry. This helps lower the risk of infection. Dry your ears with a towel or hair dryer after getting wet. Also, use ear plugs when swimming.  · Do not stick any objects in the ear to remove wax.  · If you feel water trapped in your ear, use ear drops right away. You can get these drops over the counter at most drugstores. They work by removing water from the  ear canal.  Follow-up care  Follow up with your health care provider in one week, or as advised.  When to seek medical advice  Call your health care provider right away if any of these occur:  · Ear pain becomes worse or doesnt improve after 3 days of treatment  · Redness or swelling of the outer ear occurs or gets worse  · Headache  · Painful or stiff neck  · Drowsiness or confusion  · Fever of 100.4ºF (38ºC) or higher, or as directed by your health care provider  · Seizure  Date Last Reviewed: 3/22/2015  © 3300-0402 [employer]. [address] [place] [address]. All rights reserved. This information is not intended as a substitute for professional medical care. Always follow your healthcare professional's instructions.

## 2020-02-18 DIAGNOSIS — E78.5 HYPERLIPIDEMIA, UNSPECIFIED HYPERLIPIDEMIA TYPE: ICD-10-CM

## 2020-02-18 RX ORDER — SIMVASTATIN 40 MG/1
40 TABLET, FILM COATED ORAL DAILY
Qty: 90 TABLET | Refills: 0 | Status: SHIPPED | OUTPATIENT
Start: 2020-02-18 | End: 2020-04-29 | Stop reason: SDUPTHER

## 2020-02-18 NOTE — TELEPHONE ENCOUNTER
----- Message from Freeman Maurer sent at 2/18/2020 12:05 PM CST -----  Contact: self    Patient is calling for an RX refill or new RX.  Is this a refill or new RX:  refill  RX name and strength: simvastatin (ZOCOR) 40 MG tablet  Directions (copy/paste from chart):    Is this a 30 day or 90 day RX:    Local pharmacy or mail order pharmacy:  [facility] [place] - [address] [phone] (Phone)  [phone] (Fax)  Pharmacy name and phone # (copy/paste from chart):     Comments:

## 2020-04-29 ENCOUNTER — OFFICE VISIT (OUTPATIENT)
Dept: PRIMARY CARE CLINIC | Facility: CLINIC | Age: 65
End: 2020-04-29
Payer: MEDICARE

## 2020-04-29 DIAGNOSIS — F41.9 ANXIETY: ICD-10-CM

## 2020-04-29 DIAGNOSIS — E11.69 TYPE 2 DIABETES MELLITUS WITH HYPERLIPIDEMIA: Primary | ICD-10-CM

## 2020-04-29 DIAGNOSIS — E78.5 HYPERLIPIDEMIA, UNSPECIFIED HYPERLIPIDEMIA TYPE: ICD-10-CM

## 2020-04-29 DIAGNOSIS — Z11.4 SCREENING FOR HIV (HUMAN IMMUNODEFICIENCY VIRUS): ICD-10-CM

## 2020-04-29 DIAGNOSIS — E78.5 TYPE 2 DIABETES MELLITUS WITH HYPERLIPIDEMIA: Primary | ICD-10-CM

## 2020-04-29 DIAGNOSIS — I10 ESSENTIAL HYPERTENSION, BENIGN: ICD-10-CM

## 2020-04-29 PROCEDURE — 99213 PR OFFICE/OUTPT VISIT, EST, LEVL III, 20-29 MIN: ICD-10-PCS | Mod: 95,,, | Performed by: FAMILY MEDICINE

## 2020-04-29 PROCEDURE — 99213 OFFICE O/P EST LOW 20 MIN: CPT | Mod: 95,,, | Performed by: FAMILY MEDICINE

## 2020-04-29 RX ORDER — SIMVASTATIN 40 MG/1
40 TABLET, FILM COATED ORAL DAILY
Qty: 90 TABLET | Refills: 1 | Status: SHIPPED | OUTPATIENT
Start: 2020-04-29 | End: 2020-11-11 | Stop reason: SDUPTHER

## 2020-04-29 RX ORDER — TELMISARTAN AND HYDROCHLORTHIAZIDE 80; 25 MG/1; MG/1
1 TABLET ORAL DAILY
Qty: 90 TABLET | Refills: 1 | Status: SHIPPED | OUTPATIENT
Start: 2020-04-29 | End: 2020-10-07

## 2020-04-29 NOTE — PROGRESS NOTES
Subjective:       Patient ID: Joanne Lino is a 65 y.o. female.    Chief Complaint: No chief complaint on file.    The patient location is: home  The chief complaint leading to consultation is:  Regular checkup  Visit type: audiovisual  Total time spent with patient:  7 min  Each patient to whom he or she provides medical services by telemedicine is:  (1) informed of the relationship between the physician and patient and the respective role of any other health care provider with respect to management of the patient; and (2) notified that he or she may decline to receive medical services by telemedicine and may withdraw from such care at any time.    Notes:  65-year-old female history of hypertension and diabetes, diet controlled.  Last A1c 6.3.  Not checking her blood sugar regularly, but says she is feeling fine.  Eating and drinking well, eating a lot of beans and greens, minimizing sugar intake.  No polyuria or polydipsia.  Hypertension-feeling fine, compliant with meds, not checking blood pressure regularly.  Denies headaches, blurry vision or flushing.  Needs prescriptions sent to different mail order pharmacy per    Review of Systems   Constitutional: Negative for fever.   Respiratory: Negative for shortness of breath.    Cardiovascular: Negative for chest pain.   Gastrointestinal: Negative for nausea and vomiting.   Endocrine: Negative for polydipsia and polyuria.   Neurological: Negative for dizziness and light-headedness.   Psychiatric/Behavioral: The patient is nervous/anxious.        Objective:      There were no vitals filed for this visit.  Physical Exam   Constitutional: She is oriented to person, place, and time. She appears well-developed and well-nourished.   Pulmonary/Chest: No respiratory distress.   Neurological: She is alert and oriented to person, place, and time.   Psychiatric: She has a normal mood and affect. Her behavior is normal.       Lab Results   Component Value Date    WBC 9.30  03/15/2019    HGB 12.7 03/15/2019    HCT 37.1 03/15/2019     03/15/2019    CHOL 193 08/15/2019    TRIG 143 08/15/2019    HDL 58 08/15/2019    ALT 16 08/15/2019    AST 19 08/15/2019     08/15/2019    K 4.0 08/15/2019    CL 98 (L) 08/15/2019    CREATININE 0.8 08/15/2019    BUN 28 (H) 08/15/2019    CO2 30 (H) 08/15/2019    TSH 1.24 03/22/2019    HGBA1C 6.3 (H) 08/15/2019      Assessment:       1. Type 2 diabetes mellitus with hyperlipidemia    2. Anxiety    3. Screening for HIV (human immunodeficiency virus)    4. Essential hypertension, benign    5. Hyperlipidemia, unspecified hyperlipidemia type        Plan:       Type 2 diabetes mellitus with hyperlipidemia  -     Basic metabolic panel; Future; Expected date: 04/29/2020  -     Hemoglobin A1c; Future; Expected date: 04/29/2020    Anxiety    Screening for HIV (human immunodeficiency virus)  -     HIV 1/2 Ag/Ab (4th Gen); Future; Expected date: 04/29/2020    Essential hypertension, benign  -     telmisartan-hydrochlorothiazide (MICARDIS HCT) 80-25 mg per tablet; Take 1 tablet by mouth once daily.  Dispense: 90 tablet; Refill: 1    Hyperlipidemia, unspecified hyperlipidemia type  -     simvastatin (ZOCOR) 40 MG tablet; Take 1 tablet (40 mg total) by mouth once daily.  Dispense: 90 tablet; Refill: 1      Medication List with Changes/Refills   Current Medications    B COMPLEX VITAMINS TABLET    Take 1 tablet by mouth once daily.    CALCIUM CARBONATE (CALCIUM 500 ORAL)    Take 1 tablet by mouth once daily.     CINNAMON BARK (CINNAMON ORAL)    Take by mouth every other day.    CLONAZEPAM (KLONOPIN) 1 MG TABLET    Take 1 tablet (1 mg total) by mouth 2 (two) times daily as needed for Anxiety.    CRANBERRY 400 MG CAP    Take by mouth once daily.    DULOXETINE (CYMBALTA) 60 MG CAPSULE    Take 1 capsule (60 mg total) by mouth once daily.    LAMOTRIGINE (LAMICTAL) 100 MG TABLET    Take 1.5 tablets (150 mg total) by mouth once daily.    MULTIVITAMIN CAPSULE    Take 1  capsule by mouth once daily.    POTASSIUM CHLORIDE (K-TAB) 20 MEQ    Take by mouth once daily.    TURMERIC ORAL    Take by mouth every other day.    VITAMIN E 600 UNIT CAPSULE    Take 600 Units by mouth once daily.   Changed and/or Refilled Medications    Modified Medication Previous Medication    SIMVASTATIN (ZOCOR) 40 MG TABLET simvastatin (ZOCOR) 40 MG tablet       Take 1 tablet (40 mg total) by mouth once daily.    Take 1 tablet (40 mg total) by mouth once daily.    TELMISARTAN-HYDROCHLOROTHIAZIDE (MICARDIS HCT) 80-25 MG PER TABLET telmisartan-hydrochlorothiazide (MICARDIS HCT) 80-25 mg per tablet       Take 1 tablet by mouth once daily.    Take 1 tablet by mouth once daily.   Discontinued Medications    FLUTICASONE PROPIONATE (FLONASE) 50 MCG/ACTUATION NASAL SPRAY    2 sprays in each nostril every evening.    LORATADINE (CLARITIN) 10 MG TABLET    Take 1 tablet (10 mg total) by mouth once daily.    OXYCODONE-ACETAMINOPHEN (PERCOCET) 5-325 MG PER TABLET    Take 1 tablet by mouth 2 (two) times daily as needed.

## 2020-05-04 ENCOUNTER — OFFICE VISIT (OUTPATIENT)
Dept: PSYCHIATRY | Facility: CLINIC | Age: 65
End: 2020-05-04
Payer: MEDICARE

## 2020-05-04 DIAGNOSIS — F43.10 PTSD (POST-TRAUMATIC STRESS DISORDER): ICD-10-CM

## 2020-05-04 DIAGNOSIS — F33.0 MDD (MAJOR DEPRESSIVE DISORDER), RECURRENT EPISODE, MILD: ICD-10-CM

## 2020-05-04 DIAGNOSIS — F33.1 MODERATE EPISODE OF RECURRENT MAJOR DEPRESSIVE DISORDER: Primary | ICD-10-CM

## 2020-05-04 PROCEDURE — 99213 PR OFFICE/OUTPT VISIT, EST, LEVL III, 20-29 MIN: ICD-10-PCS | Mod: 95,,, | Performed by: NURSE PRACTITIONER

## 2020-05-04 PROCEDURE — 99213 OFFICE O/P EST LOW 20 MIN: CPT | Mod: 95,,, | Performed by: NURSE PRACTITIONER

## 2020-05-04 RX ORDER — LAMOTRIGINE 200 MG/1
200 TABLET ORAL DAILY
Qty: 90 TABLET | Refills: 1 | Status: SHIPPED | OUTPATIENT
Start: 2020-05-04 | End: 2020-08-24 | Stop reason: SDUPTHER

## 2020-05-04 RX ORDER — DULOXETIN HYDROCHLORIDE 60 MG/1
60 CAPSULE, DELAYED RELEASE ORAL DAILY
Qty: 90 CAPSULE | Refills: 1 | Status: SHIPPED | OUTPATIENT
Start: 2020-05-04 | End: 2020-08-24 | Stop reason: SDUPTHER

## 2020-05-04 RX ORDER — CLONAZEPAM 1 MG/1
1 TABLET ORAL 2 TIMES DAILY PRN
Qty: 60 TABLET | Refills: 1 | Status: SHIPPED | OUTPATIENT
Start: 2020-05-04 | End: 2020-08-24 | Stop reason: SDUPTHER
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
## 2020-05-14 DIAGNOSIS — Z12.11 COLON CANCER SCREENING: ICD-10-CM

## 2020-07-03 DIAGNOSIS — Z12.31 ENCOUNTER FOR SCREENING MAMMOGRAM FOR BREAST CANCER: ICD-10-CM

## 2020-08-24 ENCOUNTER — OFFICE VISIT (OUTPATIENT)
Dept: PSYCHIATRY | Facility: CLINIC | Age: 65
End: 2020-08-24
Payer: MEDICARE

## 2020-08-24 DIAGNOSIS — F33.1 MODERATE EPISODE OF RECURRENT MAJOR DEPRESSIVE DISORDER: ICD-10-CM

## 2020-08-24 DIAGNOSIS — F31.9 BIPOLAR 1 DISORDER: Primary | ICD-10-CM

## 2020-08-24 DIAGNOSIS — F33.0 MDD (MAJOR DEPRESSIVE DISORDER), RECURRENT EPISODE, MILD: ICD-10-CM

## 2020-08-24 DIAGNOSIS — F43.10 PTSD (POST-TRAUMATIC STRESS DISORDER): ICD-10-CM

## 2020-08-24 PROCEDURE — 99214 PR OFFICE/OUTPT VISIT, EST, LEVL IV, 30-39 MIN: ICD-10-PCS | Mod: 95,,, | Performed by: NURSE PRACTITIONER

## 2020-08-24 PROCEDURE — 99214 OFFICE O/P EST MOD 30 MIN: CPT | Mod: 95,,, | Performed by: NURSE PRACTITIONER

## 2020-08-24 RX ORDER — LAMOTRIGINE 150 MG/1
150 TABLET ORAL DAILY
Qty: 180 TABLET | Refills: 1 | Status: SHIPPED | OUTPATIENT
Start: 2020-08-24 | End: 2023-07-17

## 2020-08-24 RX ORDER — DULOXETIN HYDROCHLORIDE 60 MG/1
60 CAPSULE, DELAYED RELEASE ORAL DAILY
Qty: 90 CAPSULE | Refills: 1 | Status: SHIPPED | OUTPATIENT
Start: 2020-08-24 | End: 2021-11-29 | Stop reason: SDUPTHER

## 2020-08-24 RX ORDER — CLONAZEPAM 1 MG/1
1 TABLET ORAL 2 TIMES DAILY PRN
Qty: 60 TABLET | Refills: 1 | Status: SHIPPED | OUTPATIENT
Start: 2020-08-24 | End: 2021-01-20

## 2020-08-24 RX ORDER — OXCARBAZEPINE 150 MG/1
150 TABLET, FILM COATED ORAL 2 TIMES DAILY
Qty: 60 TABLET | Refills: 2 | Status: SHIPPED | OUTPATIENT
Start: 2020-08-24 | End: 2020-09-23
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
## 2020-09-04 DIAGNOSIS — E11.9 TYPE 2 DIABETES MELLITUS WITHOUT COMPLICATION: ICD-10-CM

## 2020-09-17 ENCOUNTER — PATIENT MESSAGE (OUTPATIENT)
Dept: ADMINISTRATIVE | Facility: HOSPITAL | Age: 65
End: 2020-09-17

## 2020-09-23 ENCOUNTER — OFFICE VISIT (OUTPATIENT)
Dept: PRIMARY CARE CLINIC | Facility: CLINIC | Age: 65
End: 2020-09-23
Payer: MEDICARE

## 2020-09-23 DIAGNOSIS — F31.9 BIPOLAR 1 DISORDER: ICD-10-CM

## 2020-09-23 DIAGNOSIS — F33.1 MODERATE EPISODE OF RECURRENT MAJOR DEPRESSIVE DISORDER: Primary | ICD-10-CM

## 2020-09-23 PROCEDURE — 99441 PR PHYSICIAN TELEPHONE EVALUATION 5-10 MIN: CPT | Mod: 95,,, | Performed by: FAMILY MEDICINE

## 2020-09-23 PROCEDURE — 99441 PR PHYSICIAN TELEPHONE EVALUATION 5-10 MIN: ICD-10-PCS | Mod: 95,,, | Performed by: FAMILY MEDICINE

## 2020-09-23 NOTE — PROGRESS NOTES
Established Patient - Audio Only Telehealth Visit     The patient location is:  Home  The chief complaint leading to consultation is:  Headaches and dizziness  Visit type: Virtual visit with audio only (telephone)  Total time spent with patient:  6 min       The reason for the audio only service rather than synchronous audio and video virtual visit was related to technical difficulties or patient preference/necessity.     Each patient to whom I provide medical services by telemedicine is:  (1) informed of the relationship between the physician and patient and the respective role of any other health care provider with respect to management of the patient; and (2) notified that they may decline receive medical services by telemedicine and may withdraw from such care at any time. Patient verbally consented to receive this service via voice-only telephone call.     HPI:  Patient went out of town to her other home in [facility] 5-6 weeks ago and for got her Cymbalta here.  She was out of her medication for about 3 weeks, during which time she had frequent dizzy spells and headaches.  Started back on Cymbalta about 2 and half weeks ago, and continues to have dizziness and headaches, though this is improving.  She denies suicidal or homicidal ideation.  Sleeping more than she should.  Has been in talks with her therapist, and says she will be getting established with a new psychiatrist in the next couple of days.  Denies chest pain, palpitations or shortness of breath.    Assessment and plan:  Moderate episode of recurrent major depressive disorder    Bipolar 1 disorder     Symptoms likely due to abrupt discs continuation and withdrawal from Cymbalta.  As such, I would anticipate that she should improve over the next few weeks.  Patient was encouraged to discuss these concerns with her psychiatrist at her upcoming appointment.  Patient verbalized understanding and agreement                  E/M service or  procedure within the next 24 hours or my soonest available appointment.  Prevailing standard of care was able to be met in this audio-only visit.

## 2020-09-30 ENCOUNTER — TELEPHONE (OUTPATIENT)
Dept: PRIMARY CARE CLINIC | Facility: CLINIC | Age: 65
End: 2020-09-30

## 2020-09-30 ENCOUNTER — NURSE TRIAGE (OUTPATIENT)
Dept: ADMINISTRATIVE | Facility: CLINIC | Age: 65
End: 2020-09-30

## 2020-09-30 NOTE — TELEPHONE ENCOUNTER
----- Message from Irais Holley sent at 9/30/2020  1:11 PM CDT -----  Regarding: Ms. Leong Nurse Practioner  @ [employer] phone# [phone]   is calling in regards to her wanting for you to fax the patients medical list to her please.    Fax number: to [employer] [phone]

## 2020-09-30 NOTE — TELEPHONE ENCOUNTER
Spoke with significant other: went to [place](for personal matters/paperwork)  from [facility]. Forgot Cymbalta at home and had no Cymbalta for 2 weeks. Reduced/ has had dosage changes with  Lamictal around 1 month ago.   Saw primary care MD virtually. seeing therapist in [place] - having trouble getting appointment with psychiatrist.    Restarted Cymbalta. S/s are worsening. Pt unable to walk, slurring of works confusion.  With fall last night. Prior to all this had migraines, dizziness,lightheadedness--  Unknown start time.      Denies SI, denies HI.     instructed to call 911. Bebeto verbalizes understanding.     Reason for Disposition   Difficult to awaken or acting confused (e.g., disoriented, slurred speech)   [1] Difficult to awaken or acting very confused (disoriented, slurred speech) AND [2] new onset    Additional Information   Negative: Patient attempted suicide   Negative: Patient is threatening suicide now   Negative: Violent behavior, or threatening to physically hurt or kill someone   Negative: [1] Patient is very confused (disoriented, slurred speech) AND [2] no other adult (e.g., friend or family member) available    Protocols used: NEUROLOGIC DEFICIT-A-OH, BIPOLAR DISORDER (MANIC DEPRESSION)-A-AH

## 2020-09-30 NOTE — TELEPHONE ENCOUNTER
Dr. Mcconnell does not direct admit. Tried calling both numbers, the one listed in the message and one listed in the chart. No answer on either. They both ring for a long time and then say it cannot be completed. It looks like her spouse spoke to the oncall nurse and was given instructions to call EMS due to her s&s.

## 2020-09-30 NOTE — TELEPHONE ENCOUNTER
----- Message from Anthony Malcolm sent at 9/30/2020  8:47 AM CDT -----  Regarding: Pt [phone]  The patient wants to know if you can get her admitted to [facility] for extreme dizziness.    I transferred her to the nurse on call    Thank you

## 2020-10-02 ENCOUNTER — TELEPHONE (OUTPATIENT)
Dept: PRIMARY CARE CLINIC | Facility: CLINIC | Age: 65
End: 2020-10-02

## 2020-10-02 NOTE — TELEPHONE ENCOUNTER
----- Message from Lexi Zelaya sent at 10/2/2020  3:02 PM CDT -----  Contact:   Type:  Patient Returning Call    Who Called:  Bebeto,   Who Left Message for Patient:  Isidoro  Does the patient know what this is regarding?:  Annia  Best Call Back Number:  [phone]  Additional Information:  Please call him back. Thanks.

## 2020-10-02 NOTE — TELEPHONE ENCOUNTER
----- Message from Anthony Malcolm sent at 10/2/2020  2:52 PM CDT -----  Patient is returning a phone call.  Who left a message for the patient: Isidoro  Does patient know what this is regarding:  Yes. She just wanted you to know that she is in the hospital. She doesn't know when she will be returning.    She is in [facility]

## 2020-10-02 NOTE — TELEPHONE ENCOUNTER
----- Message from Sheryl Romeo sent at 10/2/2020  2:44 PM CDT -----  Contact: Bebeto   Bebeto states he needs to speak with someone regarding the pt and her condition. Bebeto states the pt will be released from the hospital in [place] today or tomorrow. Please call and advise.

## 2020-10-05 ENCOUNTER — PATIENT MESSAGE (OUTPATIENT)
Dept: ADMINISTRATIVE | Facility: HOSPITAL | Age: 65
End: 2020-10-05

## 2020-10-06 ENCOUNTER — TELEPHONE (OUTPATIENT)
Dept: PRIMARY CARE CLINIC | Facility: CLINIC | Age: 65
End: 2020-10-06

## 2020-10-06 NOTE — TELEPHONE ENCOUNTER
Spoke to Bebeto regarding to patient. Instructed him to contact:  [facility]   [address] [place] [address]  Phone: [phone]    To schedule the patient an appointment. Stated understanding

## 2020-10-06 NOTE — TELEPHONE ENCOUNTER
I don't know any providers in [place], and she should not need a referral. I would suggest that she make an appointment with an [facility] provider in [place], or contact the hospital where she was admitted for recommendations.

## 2020-10-06 NOTE — TELEPHONE ENCOUNTER
Spoke to patient's friend, Bebeto. States patient was just discharged from hospital on Saturday. Patient was in hospital due to her blood sugar being extremely high (in the 400s) she is still in [place]. She is now taking insulin and needs a referral to a doctor out in [place] so she can follow up with them regarding this issue. Sugar is still in the 300-350 range. I attempted to contact the number on file, states call cannot be completed. I do not know which kind of insulin the patient is on, and neither did the person on the phone. Can we refer the patient to someone out in [place] to address this?

## 2020-10-19 ENCOUNTER — TELEPHONE (OUTPATIENT)
Dept: PRIMARY CARE CLINIC | Facility: CLINIC | Age: 65
End: 2020-10-19

## 2020-10-19 RX ORDER — LANCETS
1 EACH MISCELLANEOUS 2 TIMES DAILY
Qty: 200 EACH | Refills: 1 | Status: SHIPPED | OUTPATIENT
Start: 2020-10-19 | End: 2021-02-23 | Stop reason: SDUPTHER

## 2020-10-19 RX ORDER — INSULIN GLARGINE 100 [IU]/ML
15 INJECTION, SOLUTION SUBCUTANEOUS 2 TIMES DAILY
Qty: 3 ML | Refills: 1 | Status: SHIPPED | OUTPATIENT
Start: 2020-10-19 | End: 2020-10-22 | Stop reason: SDUPTHER

## 2020-10-19 RX ORDER — METFORMIN HYDROCHLORIDE 1000 MG/1
1000 TABLET ORAL 2 TIMES DAILY WITH MEALS
Qty: 180 TABLET | Refills: 1 | Status: SHIPPED | OUTPATIENT
Start: 2020-10-19 | End: 2020-11-11 | Stop reason: SDUPTHER

## 2020-10-19 NOTE — TELEPHONE ENCOUNTER
----- Message from Sheryl Romeo sent at 10/19/2020  8:54 AM CDT -----  Contact: self [phone]  Diabetic or Medical Supplies.  What supplies are needed: test strips  What is the brand name of the supplies:   Is this a refill or new prescription:  new  Who prescribed the supplies:  JAVAD Mcconnell  Pharmacy or company name, phone # and location:  [facility] - [address] [phone] (Phone)  [phone] (Fax)  Comments:  Pt is also asking about a CGM. Please call and advise.

## 2020-10-19 NOTE — TELEPHONE ENCOUNTER
Patient notified her prescriptions have been sent. She is asking about a CGM. Do you need to see her to set her up for this, Veronica?

## 2020-10-19 NOTE — TELEPHONE ENCOUNTER
We need blood sugar logs showing she is testing 4 times a day. We also need to either see me so I can document or have Dr. Mcconnell document in his note that CGM therapy would be beneficial, something like this:     CGM will allow us to remotely monitor patients glucose levels and will help us make any necessary adjustments to their diabetes regimen; while keeping our patients safe and our staff informed during the PHE    I have paperwork I can complete and bring to you to have him sign.

## 2020-10-22 ENCOUNTER — TELEPHONE (OUTPATIENT)
Dept: PRIMARY CARE CLINIC | Facility: CLINIC | Age: 65
End: 2020-10-22

## 2020-10-22 NOTE — TELEPHONE ENCOUNTER
"Please let me know if the below medication needs to be changed. [facility] is saying they can not send a "3 mL" pen out because it comes as a box of five. How should the medication be written? Is it supposed to be a one day supply, 30 day or 90 day supply?  "

## 2020-10-22 NOTE — TELEPHONE ENCOUNTER
Looks like patient est with Dr. De Leon, but you sent this medication recently (10/19)      She saw Dr. De Leon on 10/7

## 2020-10-22 NOTE — TELEPHONE ENCOUNTER
Bebeto notified that I cannot give him any of the patients information because he is not listed on her chart. He insisted that he is (looks like a spouse by the name of the Miguel Holland but did not tell him that) Bebeto states he is her significant other and handles everything for her. I once again told him that I cannot give him any of her medical information. Only information given was the name of the new PCP listed in her chart.

## 2020-10-22 NOTE — TELEPHONE ENCOUNTER
----- Message from Irais Holley sent at 10/22/2020  8:31 AM CDT -----  Regarding: Pts Friend Bebeto Mobile# [phone]  Patient is returning a phone call.  Who left a message for the patient:   Does patient know what this is regarding:    Comments: Mr. Tate would like for you to give him a call back in regards to him saying that you called for the patient on today.

## 2020-10-22 NOTE — TELEPHONE ENCOUNTER
----- Message from Corrine Rao sent at 10/22/2020  9:40 AM CDT -----  [facility]   [facility] states they need to verify the quantity of this medication LANTUS SOLOSTAR U-100 INSULIN glargine 100 units/mL (3mL) SubQ pen. Please call and advise. Thank you

## 2020-10-22 NOTE — TELEPHONE ENCOUNTER
Patient says she does not remember seeing anyone else and also states she has been a little confused lately. She states she will try and figure out her drs this morning

## 2020-10-25 RX ORDER — INSULIN GLARGINE 100 [IU]/ML
15 INJECTION, SOLUTION SUBCUTANEOUS 2 TIMES DAILY
Qty: 15 ML | Refills: 1 | Status: SHIPPED | OUTPATIENT
Start: 2020-10-25 | End: 2020-11-05 | Stop reason: SDUPTHER

## 2020-11-11 PROBLEM — D69.6 THROMBOCYTOPENIA, UNSPECIFIED: Status: ACTIVE | Noted: 2020-11-11

## 2020-11-11 PROBLEM — M54.16 LUMBAR RADICULITIS: Status: RESOLVED | Noted: 2018-04-20 | Resolved: 2020-11-11

## 2020-11-11 PROBLEM — J81.0 ACUTE PULMONARY EDEMA: Status: ACTIVE | Noted: 2020-11-11

## 2020-11-11 PROBLEM — R56.9 SEIZURE: Status: ACTIVE | Noted: 2020-11-11

## 2020-11-11 PROBLEM — M46.00 SPINAL ENTHESOPATHY: Status: ACTIVE | Noted: 2020-11-11

## 2020-11-11 PROBLEM — I47.10 SVT (SUPRAVENTRICULAR TACHYCARDIA): Status: RESOLVED | Noted: 2020-11-11 | Resolved: 2020-11-11

## 2020-11-11 PROBLEM — J81.0 ACUTE PULMONARY EDEMA: Status: RESOLVED | Noted: 2020-11-11 | Resolved: 2020-11-11

## 2020-11-11 PROBLEM — I47.10 SVT (SUPRAVENTRICULAR TACHYCARDIA): Status: ACTIVE | Noted: 2020-11-11

## 2020-11-11 PROBLEM — G83.4 CAUDA EQUINA SYNDROME: Status: ACTIVE | Noted: 2020-11-11

## 2021-01-20 PROBLEM — M46.00 SPINAL ENTHESOPATHY: Status: RESOLVED | Noted: 2020-11-11 | Resolved: 2021-01-20

## 2021-01-20 PROBLEM — D69.6 THROMBOCYTOPENIA, UNSPECIFIED: Status: RESOLVED | Noted: 2020-11-11 | Resolved: 2021-01-20

## 2021-01-20 PROBLEM — R56.9 SEIZURE: Status: RESOLVED | Noted: 2020-11-11 | Resolved: 2021-01-20

## 2023-03-20 ENCOUNTER — PATIENT OUTREACH (OUTPATIENT)
Dept: ADMINISTRATIVE | Facility: HOSPITAL | Age: 68
End: 2023-03-20
Payer: MEDICARE

## 2023-03-20 DIAGNOSIS — Z12.31 BREAST CANCER SCREENING BY MAMMOGRAM: ICD-10-CM

## 2023-03-20 DIAGNOSIS — E11.69 TYPE 2 DIABETES MELLITUS WITH HYPERLIPIDEMIA: Primary | ICD-10-CM

## 2023-03-20 DIAGNOSIS — E78.5 TYPE 2 DIABETES MELLITUS WITH HYPERLIPIDEMIA: Primary | ICD-10-CM

## 2023-03-20 PROBLEM — F33.1 MODERATE EPISODE OF RECURRENT MAJOR DEPRESSIVE DISORDER: Status: RESOLVED | Noted: 2019-03-22 | Resolved: 2023-03-20

## 2024-01-18 ENCOUNTER — PATIENT OUTREACH (OUTPATIENT)
Dept: ADMINISTRATIVE | Facility: HOSPITAL | Age: 69
End: 2024-01-18
Payer: MEDICARE

## 2024-02-08 ENCOUNTER — TELEPHONE (OUTPATIENT)
Dept: PRIMARY CARE CLINIC | Facility: CLINIC | Age: 69
End: 2024-02-08
Payer: MEDICARE

## 2024-02-08 NOTE — TELEPHONE ENCOUNTER
----- Message from Jackie Dill sent at 2/8/2024  9:02 AM CST -----  Contact: [phone]  [email or web address]     Patient is calling for Medical Advice regarding: coughing, sneezing, typical cold     How long has patient had these symptoms: night before last     Would like response via [employer]:  call    Comments:    Pt says she is currently in town and she used to be a pt of the provider and has a cold. She would like to know if since she has diabetes if there is anything she can take over the counter for her cold. Please Advise

## 2024-02-08 NOTE — TELEPHONE ENCOUNTER
Called pt regarding message. Pt stated she has a cough and runny nose. Symptoms started 2/6. Pt is requesting rx.

## 2025-02-20 ENCOUNTER — PATIENT OUTREACH (OUTPATIENT)
Dept: ADMINISTRATIVE | Facility: HOSPITAL | Age: 70
End: 2025-02-20
Payer: MEDICARE

## 2025-02-20 DIAGNOSIS — E11.69 TYPE 2 DIABETES MELLITUS WITH HYPERLIPIDEMIA: Primary | ICD-10-CM

## 2025-02-20 DIAGNOSIS — E78.5 TYPE 2 DIABETES MELLITUS WITH HYPERLIPIDEMIA: Primary | ICD-10-CM

## 2025-02-23 PROBLEM — E66.01 MORBID OBESITY WITH BMI OF 40.0-44.9, ADULT: Status: RESOLVED | Noted: 2019-03-22 | Resolved: 2025-02-23

## 2025-03-28 ENCOUNTER — PATIENT MESSAGE (OUTPATIENT)
Dept: ADMINISTRATIVE | Facility: HOSPITAL | Age: 70
End: 2025-03-28
Payer: MEDICARE

## 2025-05-30 ENCOUNTER — PATIENT OUTREACH (OUTPATIENT)
Dept: ADMINISTRATIVE | Facility: HOSPITAL | Age: 70
End: 2025-05-30
Payer: MEDICARE

## (undated) DEVICE — BANDAGE ADHESIVE

## (undated) DEVICE — DRESSING LEUKOPLAST FLEX 1X3IN